# Patient Record
Sex: FEMALE | Race: WHITE | NOT HISPANIC OR LATINO | Employment: FULL TIME | URBAN - METROPOLITAN AREA
[De-identification: names, ages, dates, MRNs, and addresses within clinical notes are randomized per-mention and may not be internally consistent; named-entity substitution may affect disease eponyms.]

---

## 2019-08-09 ENCOUNTER — TELEPHONE (OUTPATIENT)
Dept: NEUROLOGY | Facility: CLINIC | Age: 44
End: 2019-08-09

## 2019-09-12 ENCOUNTER — CONSULT (OUTPATIENT)
Dept: NEUROLOGY | Facility: CLINIC | Age: 44
End: 2019-09-12
Payer: COMMERCIAL

## 2019-09-12 ENCOUNTER — APPOINTMENT (OUTPATIENT)
Dept: LAB | Facility: MEDICAL CENTER | Age: 44
End: 2019-09-12
Payer: COMMERCIAL

## 2019-09-12 VITALS
DIASTOLIC BLOOD PRESSURE: 69 MMHG | WEIGHT: 202 LBS | HEART RATE: 83 BPM | HEIGHT: 67 IN | BODY MASS INDEX: 31.71 KG/M2 | SYSTOLIC BLOOD PRESSURE: 115 MMHG

## 2019-09-12 DIAGNOSIS — R20.2 NUMBNESS AND TINGLING IN LEFT HAND: ICD-10-CM

## 2019-09-12 DIAGNOSIS — R20.0 NUMBNESS IN BOTH LEGS: ICD-10-CM

## 2019-09-12 DIAGNOSIS — G43.109 MIGRAINE WITH AURA AND WITHOUT STATUS MIGRAINOSUS, NOT INTRACTABLE: ICD-10-CM

## 2019-09-12 DIAGNOSIS — M79.642 LEFT HAND PAIN: Primary | ICD-10-CM

## 2019-09-12 DIAGNOSIS — R20.0 NUMBNESS AND TINGLING IN LEFT HAND: ICD-10-CM

## 2019-09-12 DIAGNOSIS — M79.642 LEFT HAND PAIN: ICD-10-CM

## 2019-09-12 LAB
BUN SERPL-MCNC: 11 MG/DL (ref 5–25)
C3 SERPL-MCNC: 142 MG/DL (ref 90–180)
C4 SERPL-MCNC: 25 MG/DL (ref 10–40)
CREAT SERPL-MCNC: 0.68 MG/DL (ref 0.6–1.3)
ERYTHROCYTE [SEDIMENTATION RATE] IN BLOOD: 39 MM/HOUR (ref 0–20)
GFR SERPL CREATININE-BSD FRML MDRD: 107 ML/MIN/1.73SQ M
VIT B12 SERPL-MCNC: 832 PG/ML (ref 100–900)

## 2019-09-12 PROCEDURE — 86235 NUCLEAR ANTIGEN ANTIBODY: CPT

## 2019-09-12 PROCEDURE — 99205 OFFICE O/P NEW HI 60 MIN: CPT | Performed by: PSYCHIATRY & NEUROLOGY

## 2019-09-12 PROCEDURE — 82565 ASSAY OF CREATININE: CPT

## 2019-09-12 PROCEDURE — 86225 DNA ANTIBODY NATIVE: CPT

## 2019-09-12 PROCEDURE — 36415 COLL VENOUS BLD VENIPUNCTURE: CPT

## 2019-09-12 PROCEDURE — 82607 VITAMIN B-12: CPT

## 2019-09-12 PROCEDURE — 86430 RHEUMATOID FACTOR TEST QUAL: CPT

## 2019-09-12 PROCEDURE — 86255 FLUORESCENT ANTIBODY SCREEN: CPT

## 2019-09-12 PROCEDURE — 86160 COMPLEMENT ANTIGEN: CPT

## 2019-09-12 PROCEDURE — 85652 RBC SED RATE AUTOMATED: CPT

## 2019-09-12 PROCEDURE — 84520 ASSAY OF UREA NITROGEN: CPT

## 2019-09-12 PROCEDURE — 86376 MICROSOMAL ANTIBODY EACH: CPT

## 2019-09-12 RX ORDER — SUMATRIPTAN AND NAPROXEN SODIUM 85; 500 MG/1; MG/1
1 TABLET, FILM COATED ORAL EVERY 2 HOUR PRN
COMMUNITY
Start: 2018-12-05 | End: 2022-04-27 | Stop reason: SDUPTHER

## 2019-09-12 RX ORDER — QUININE SULFATE 324 MG/1
648 CAPSULE ORAL 2 TIMES DAILY PRN
COMMUNITY
Start: 2018-06-05 | End: 2021-06-11

## 2019-09-12 RX ORDER — LEVONORGESTREL AND ETHINYL ESTRADIOL 0.15-0.03
1 KIT ORAL DAILY
COMMUNITY
Start: 2017-11-08 | End: 2021-06-11

## 2019-09-12 RX ORDER — LORAZEPAM 1 MG/1
TABLET ORAL
Qty: 2 TABLET | Refills: 0 | OUTPATIENT
Start: 2019-09-12 | End: 2019-10-03

## 2019-09-12 NOTE — PROGRESS NOTES
[facility]  PATIENT:  Michaelle Jacobs  MRN:  [id number]  :  1975  DATE OF SERVICE:  2019    Assessment/Plan:     Problem List Items Addressed This Visit        Cardiovascular and Mediastinum    Migraine with aura and without status migrainosus, not intractable    Relevant Medications    topiramate (TOPAMAX) 200 MG tablet    sertraline (ZOLOFT) 50 mg tablet    SUMAtriptan-naproxen (TREXIMET)  MG per tablet       Other    Left hand pain - Primary    Relevant Orders    ANCA Screen With MPO and PR3 With Reflex To ANCA Titer    Anti-DNA antibody, double-stranded    Sm/RNP Antibody    Numbness and tingling in left hand    Relevant Medications    LORazepam (ATIVAN) 1 mg tablet    Other Relevant Orders    RF Screen w/ Reflex to Titer    MRI brain MS wo and w contrast    MRI cervical spine with and without contrast    Sjogren's Antibodies    Complement Comp C3 + C4    Vitamin B12 (Completed)    Anti-microsomal antibody    BUN (Completed)    Creatinine, serum (Completed)    Numbness in both legs    Relevant Orders    Sedimentation rate, automated (Completed)         Mrs Christina Lutz has presented for evaluation of sensory dysfunction in her hands and feet  Patient has concern for developing CNS demyelination as her sister has MS and she is getting disabled, with worsening fatigue  We personally reviewed MRI brain from ,  and  - patient does not have single white matter lesion in her brain parenchyma  Considering today's evaluation and her history - we agreed to provide evaluation for CNS demyelination and connective tissue disorder  MRI brain and Cervical region will be further schedued, and connective tissue evaluation was advised as well, considering extensive family history of autoimmune disorders  Based on findings, patient may require LP and rheumatology evaluation  Patient is to follow in 6-8 weeks       Greater than 50% of the 60 minutes evaluation was a face-to-face discussion regarding  the pathophysiology of her current symptoms and further plan, as well as counseling, educating, and coordinating the patient's care  Subjective: sensory dysfunction in hands and feet with wrist pain    HPI History of Present Illness    Mrs Johnson Oreilly is  39 yo female who presented to [facility] for ruling out MS  Patient has a history of anxiety epilepsy and migraines, interstitial cystitis, SVT   Patient has established care with [facility]  Patient has seizures and migraines, sweating/temperature intolerance and pain in hands, with fatigue  Patient stated she has no energy and she has no motivation  Patient has recent pain in hands and constant tingling and pain in hands and feet, and it is going on and off since February 2019, and constant since June 2019  Patient is concerned for developing  MS, as her sister has MS as well  Patient has bouts of worsening fatigue and several joints pain, no erythematous changes has been noted on todays evaluation  Left hand pain has been most concerning  No focal weakness, no vision loss or double vision describe, no bladder dysfunction  The following portions of the patient's history were reviewed and updated as appropriate: She  has no past medical history on file  She   Patient Active Problem List    Diagnosis Date Noted    Left hand pain 09/12/2019    Numbness and tingling in left hand 09/12/2019    Numbness in both legs 09/12/2019    Migraine with aura and without status migrainosus, not intractable 09/12/2019     She  has no past surgical history on file  Her family history is not on file  She  reports that she has never smoked  She has never used smokeless tobacco  She reports that she drinks alcohol  Her drug history is not on file    Current Outpatient Medications   Medication Sig Dispense Refill    levonorgestrel-ethinyl estradiol (SEASONALE) 0 15-0 03 MG per tablet Take 1 tablet by mouth daily      sertraline (ZOLOFT) 50 mg tablet       SUMAtriptan-naproxen (TREXIMET)  MG per tablet Take 1 tablet by mouth every 2 (two) hours as needed      topiramate (TOPAMAX) 200 MG tablet       LORazepam (ATIVAN) 1 mg tablet Take 1 tab 40 min prior to imaging with a second tab 10 min prior to MRI 2 tablet 0    quiNINE (QUALAQUIN) 324 mg capsule Take 648 mg by mouth 2 (two) times a day as needed       No current facility-administered medications for this visit  Current Outpatient Medications on File Prior to Visit   Medication Sig    levonorgestrel-ethinyl estradiol (SEASONALE) 0 15-0 03 MG per tablet Take 1 tablet by mouth daily    sertraline (ZOLOFT) 50 mg tablet     SUMAtriptan-naproxen (TREXIMET)  MG per tablet Take 1 tablet by mouth every 2 (two) hours as needed    topiramate (TOPAMAX) 200 MG tablet     quiNINE (QUALAQUIN) 324 mg capsule Take 648 mg by mouth 2 (two) times a day as needed     No current facility-administered medications on file prior to visit  She is allergic to tetracycline            Objective:    Blood pressure 115/69, pulse 83, height 5' 6 5" (1 689 m), weight 91 6 kg (202 lb)  Physical Exam/Neurological Exam  CONSTITUTIONAL: NAD, pleasant  NECK: supple, no lymphadenopathy, no thyromegaly, no JVD  CARDIOVASCULAR: RRR, normal S1S2, no murmurs, no rubs  RESP: clear to auscultation bilaterally, no wheezes/rhonchi/rales  ABDOMEN: soft, non tender, non distended  SKIN: no rash or skin lesions  EXTREMITIES: no edema, pulses 2+bilaterally  PSYCH: appropriate mood and affect  NEUROLOGIC COMPREHENSIVE EXAM: Patient is oriented to person, place and time, NAD; appropriate affect  CN II, III, IV, V, VI, VII,VIII,IX,X,XI-XII intact with EOMI, PERRLA, OKN intact, VF grossly intact, fundi poorly visualized secondary to pupillary constriction; symmetric face noted   Motor: 5/5 UE/LE bilateral symmetric; Sensory: diminished to light touch and pinprick feet bilaterally; normal vibration sensation feet bilaterally; Coordination within normal limits on FTN and DANIS testing; DTR: 2/4 through, no Babinski, no clonus  Tandem gait is intact  Romberg: negative  ROS:  12 points of review of system was reviewed with the patient and was unremarkable with exception: see HPI  Review of Systems   Constitutional: Positive for fatigue  HENT: Positive for sinus pressure, sinus pain, sore throat and trouble swallowing  Eyes: Negative  Respiratory: Negative  Cardiovascular: Positive for chest pain  Gastrointestinal: Positive for abdominal pain  Endocrine: Negative  Genitourinary: Positive for frequency and urgency  Musculoskeletal: Positive for arthralgias and back pain  Skin: Negative  Allergic/Immunologic: Negative  Neurological: Positive for numbness and headaches  Face numbness, tingling   Hematological: Negative  Psychiatric/Behavioral: Positive for sleep disturbance  The patient is nervous/anxious

## 2019-09-13 LAB
DSDNA AB SER-ACNC: <1 IU/ML (ref 0–9)
ENA RNP AB SER-ACNC: 0.5 AI (ref 0–0.9)
ENA SM AB SER-ACNC: <0.2 AI (ref 0–0.9)
ENA SS-A AB SER-ACNC: <0.2 AI (ref 0–0.9)
ENA SS-B AB SER-ACNC: <0.2 AI (ref 0–0.9)
RHEUMATOID FACT SER QL LA: NEGATIVE
THYROPEROXIDASE AB SERPL-ACNC: 22 IU/ML (ref 0–34)

## 2019-09-17 ENCOUNTER — TRANSCRIBE ORDERS (OUTPATIENT)
Dept: ADMINISTRATIVE | Facility: HOSPITAL | Age: 44
End: 2019-09-17

## 2019-09-17 DIAGNOSIS — E01.0 THYROMEGALY: Primary | ICD-10-CM

## 2019-09-17 LAB
C-ANCA TITR SER IF: ABNORMAL TITER
MYELOPEROXIDASE AB SER IA-ACNC: <9 U/ML (ref 0–9)
P-ANCA ATYPICAL TITR SER IF: ABNORMAL TITER
P-ANCA TITR SER IF: ABNORMAL TITER
PROTEINASE3 AB SER IA-ACNC: <3.5 U/ML (ref 0–3.5)

## 2019-09-20 ENCOUNTER — HOSPITAL ENCOUNTER (OUTPATIENT)
Dept: ULTRASOUND IMAGING | Facility: MEDICAL CENTER | Age: 44
Discharge: HOME | End: 2019-09-20
Payer: COMMERCIAL

## 2019-09-20 DIAGNOSIS — E01.0 THYROMEGALY: ICD-10-CM

## 2019-09-20 PROCEDURE — 76536 US EXAM OF HEAD AND NECK: CPT

## 2019-09-23 ENCOUNTER — TELEPHONE (OUTPATIENT)
Dept: NEUROLOGY | Facility: CLINIC | Age: 44
End: 2019-09-23

## 2019-09-23 NOTE — TELEPHONE ENCOUNTER
Patient called in with medication issue  Script for lorazepam was not at pharmacy for patient to   Call placed to pharmacy  Spoke to pharmacist  No record of script from 9/12/19  Verbal order given using script from 9/12/19  Patient made aware

## 2019-09-24 ENCOUNTER — HOSPITAL ENCOUNTER (OUTPATIENT)
Dept: MRI IMAGING | Facility: HOSPITAL | Age: 44
Discharge: HOME | End: 2019-09-24
Attending: PSYCHIATRY & NEUROLOGY
Payer: COMMERCIAL

## 2019-09-24 DIAGNOSIS — R20.2 NUMBNESS AND TINGLING IN LEFT HAND: ICD-10-CM

## 2019-09-24 DIAGNOSIS — R20.0 NUMBNESS AND TINGLING IN LEFT HAND: ICD-10-CM

## 2019-09-24 PROCEDURE — 70551 MRI BRAIN STEM W/O DYE: CPT

## 2019-09-24 PROCEDURE — 72141 MRI NECK SPINE W/O DYE: CPT

## 2019-09-25 ENCOUNTER — TELEPHONE (OUTPATIENT)
Dept: NEUROLOGY | Facility: CLINIC | Age: 44
End: 2019-09-25

## 2019-09-25 NOTE — TELEPHONE ENCOUNTER
pt called and states that she had mri done yesterday  she states that they were not done with constrast because she was moving too much even with ativan 1mg x 2   she states that if she needs to have test repeated that she may need anesthesia or a higher dose of ativan   she states that ativan "snowed" her later in the day around 4:30 and she took it around 12:45-1pm  results are not available in system yet  please advise    [phone]

## 2019-09-25 NOTE — TELEPHONE ENCOUNTER
Pt called back for results, states that she is anxious for the results, I made her aware the studies are not read yet

## 2019-09-26 ENCOUNTER — TELEPHONE (OUTPATIENT)
Dept: NEUROLOGY | Facility: CLINIC | Age: 44
End: 2019-09-26

## 2019-09-26 NOTE — TELEPHONE ENCOUNTER
----- Message from Vijay Mata MD sent at 9/25/2019  4:54 PM EDT -----  Cervical spine Imaging was personally reviewed and no significant pathology was noted    Will review with patient at follow up appointment

## 2019-09-26 NOTE — TELEPHONE ENCOUNTER
Message about results of MRI brain and spine was sent to the patient through [facility] Chart - patient has no demyelination in her spine, with no changes in her age related white matter in the brain

## 2019-09-26 NOTE — TELEPHONE ENCOUNTER
----- Message from Narendra Campos RN sent at 9/26/2019  7:13 AM EDT -----      ----- Message -----  From: Judi Byrne MD  Sent: 9/25/2019   4:57 PM EDT  To: Narendra Campos RN    Brain Imaging was personally reviewed and no significant pathology was noted    Will review with patient at follow up appointment

## 2019-09-26 NOTE — TELEPHONE ENCOUNTER
Patient called for MRI results  Please advise what we can review with patient  She is concerned she has MS and would like to move follow up appt sooner

## 2019-09-26 NOTE — TELEPHONE ENCOUNTER
FYI (no action needed): Pt's  called in requesting sooner follow up appt  Would like to discuss MRI results in further detail  Pt follow up rescheduled for 10/03/19

## 2019-09-30 PROBLEM — K21.9 LARYNGOPHARYNGEAL REFLUX (LPR): Status: ACTIVE | Noted: 2019-09-30

## 2019-10-03 ENCOUNTER — OFFICE VISIT (OUTPATIENT)
Dept: NEUROLOGY | Facility: CLINIC | Age: 44
End: 2019-10-03
Payer: COMMERCIAL

## 2019-10-03 VITALS
HEART RATE: 89 BPM | WEIGHT: 199 LBS | SYSTOLIC BLOOD PRESSURE: 118 MMHG | HEIGHT: 67 IN | DIASTOLIC BLOOD PRESSURE: 78 MMHG | BODY MASS INDEX: 31.23 KG/M2

## 2019-10-03 DIAGNOSIS — R20.2 NUMBNESS AND TINGLING IN LEFT HAND: ICD-10-CM

## 2019-10-03 DIAGNOSIS — M79.642 LEFT HAND PAIN: ICD-10-CM

## 2019-10-03 DIAGNOSIS — R76.8 ANTINEUTROPHIL CYTOPLASMIC ANTIBODY (ANCA) POSITIVE: ICD-10-CM

## 2019-10-03 DIAGNOSIS — R20.0 NUMBNESS IN BOTH LEGS: ICD-10-CM

## 2019-10-03 DIAGNOSIS — G43.109 MIGRAINE WITH AURA AND WITHOUT STATUS MIGRAINOSUS, NOT INTRACTABLE: Primary | ICD-10-CM

## 2019-10-03 DIAGNOSIS — R20.0 NUMBNESS AND TINGLING IN LEFT HAND: ICD-10-CM

## 2019-10-03 PROCEDURE — 99215 OFFICE O/P EST HI 40 MIN: CPT | Performed by: PSYCHIATRY & NEUROLOGY

## 2019-10-03 NOTE — PROGRESS NOTES
[facility]  PATIENT:  Harpreet Mcwilliams  MRN:  [id number]  :  1975  DATE OF SERVICE:  10/3/2019  Assessment/Plan:   Problem List Items Addressed This Visit        Cardiovascular and Mediastinum    Migraine with aura and without status migrainosus, not intractable - Primary       Other    Left hand pain    Numbness and tingling in left hand    Relevant Orders    Ambulatory referral to Rheumatology    Numbness in both legs    Antineutrophil cytoplasmic antibody (ANCA) positive    Relevant Orders    Ambulatory referral to Rheumatology           Mrs Luis Smalls has presented for follow up on multifocal sensory dysfunction, involving upper and lower extremities; no new focal weakness or vision loss has been reported  We personally reviewed imaging studies - no concern for MS; patient has no corpus callosum involvement and no classic white matter changes noted in her brain parenchyma  Patient has stressful situation in her family now as well  We did reviewed her blood work with mildly positive pANCA and mildly elevated ESR- patient was advised to   follow up with rheumatology for further evaluation  Patient is to continue Vitamin D and she is to be gluten free at this point  We agreed to have 6 months follow up visit  Greater than 50% of the 40 minutes evaluation was a face-to-face discussion regarding  the pathophysiology of her current symptoms and further plan, as well as counseling, educating, and coordinating the patient's care  Subjective: sensory dysfunction in her upper and lower extremities  HPI History of Present Illness    Mrs Luis Smalls is  37 yo female who presented to [address] [place] [address] MS center for ruling out MS  Patient has anxiety, epilepsy and migraines, interstitial cystitis, SVT, sweating/temperature intolerance and pain in hands, chronic fatigue    Patient has established care with [facility] team  Patient sister has MS,patient and her  has been feeling more fatigued and more sensory dysfunction in her feet and hands  Blood work was completed with atypical pANCA 1:40 (The atypical pANCA pattern has been observed in a significant percentage of patients with ulcerative colitis, primary sclerosing   cholangitis and autoimmune hepatitis ); ESR 39; dsDNA and RF, with Sjogrens are negative; normal b12; C3 and C4 complement is normal;   MRI C-spine 9/24/19: Cervical spine intrinsically normal   Chiari I malformation  Tiny central noncompressive C5-C6 protrusion  MRI brain 9/24/19: Chiari I malformation  3-4 tiny foci of high signal subcortical white matter of both hemispheres nonspecific but atypical for demyelinating disease  Query, located migraine, collagen vascular disease, disease, precocious small vessel disease  The following portions of the patient's history were reviewed and updated as appropriate:   She  has no past medical history on file  She   Patient Active Problem List    Diagnosis Date Noted    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    Laryngopharyngeal reflux (LPR) 09/30/2019    Left hand pain 09/12/2019    Numbness and tingling in left hand 09/12/2019    Numbness in both legs 09/12/2019    Migraine with aura and without status migrainosus, not intractable 09/12/2019     She  has no past surgical history on file  Her family history is not on file  She  reports that she has never smoked  She has never used smokeless tobacco  She reports that she drinks alcohol  Her drug history is not on file    Current Outpatient Medications   Medication Sig Dispense Refill    levonorgestrel-ethinyl estradiol (SEASONALE) 0 15-0 03 MG per tablet Take 1 tablet by mouth daily      omeprazole (PriLOSEC) 40 MG capsule Take 1 capsule (40 mg total) by mouth daily 60 capsule 2    quiNINE (QUALAQUIN) 324 mg capsule Take 648 mg by mouth 2 (two) times a day as needed      ranitidine (ZANTAC) 300 MG tablet Take 1 tablet (300 mg total) by mouth daily at bedtime 60 tablet 2    sertraline (ZOLOFT) 50 mg tablet       SUMAtriptan-naproxen (TREXIMET)  MG per tablet Take 1 tablet by mouth every 2 (two) hours as needed      topiramate (TOPAMAX) 200 MG tablet        No current facility-administered medications for this visit  Current Outpatient Medications on File Prior to Visit   Medication Sig    levonorgestrel-ethinyl estradiol (SEASONALE) 0 15-0 03 MG per tablet Take 1 tablet by mouth daily    omeprazole (PriLOSEC) 40 MG capsule Take 1 capsule (40 mg total) by mouth daily    quiNINE (QUALAQUIN) 324 mg capsule Take 648 mg by mouth 2 (two) times a day as needed    ranitidine (ZANTAC) 300 MG tablet Take 1 tablet (300 mg total) by mouth daily at bedtime    sertraline (ZOLOFT) 50 mg tablet     SUMAtriptan-naproxen (TREXIMET)  MG per tablet Take 1 tablet by mouth every 2 (two) hours as needed    topiramate (TOPAMAX) 200 MG tablet     [DISCONTINUED] LORazepam (ATIVAN) 1 mg tablet Take 1 tab 40 min prior to imaging with a second tab 10 min prior to MRI (Patient not taking: Reported on 10/3/2019)     No current facility-administered medications on file prior to visit  She is allergic to tetracycline            Objective:    Blood pressure 118/78, pulse 89, height 5' 7" (1 702 m), weight 90 3 kg (199 lb)  Physical Exam/Neurological Exam    CONSTITUTIONAL: NAD, pleasant  NECK: supple, no lymphadenopathy, no thyromegaly, no JVD  CARDIOVASCULAR: RRR, normal S1S2, no murmurs, no rubs  RESP: clear to auscultation bilaterally, no wheezes/rhonchi/rales  ABDOMEN: soft, non tender, non distended  SKIN: no rash or skin lesions  EXTREMITIES: no edema, pulses 2+bilaterally  PSYCH: appropriate mood and affect  NEUROLOGIC COMPREHENSIVE EXAM: Patient is oriented to person, place and time, NAD; appropriate affect   CN II, III, IV, V, VI, VII,VIII,IX,X,XI-XII intact with EOMI, PERRLA, OKN intact, VF grossly intact, fundi poorly visualized secondary to pupillary constriction; symmetric face noted  Motor: 5/5 UE/LE bilateral symmetric; Sensory: intact to light touch and pinprick bilaterally; normal vibration sensation feet bilaterally; Coordination within normal limits on FTN and DANIS testing; DTR: 2/4 through, no Babinski, no clonus  Tandem gait is intact  Romberg: negative  ROS:  12 points of review of system was reviewed with the patient and was unremarkable with exception: see HPI  Review of Systems   Constitutional: Negative  HENT: Positive for sore throat and trouble swallowing  Eyes: Negative  Respiratory: Negative  Cardiovascular: Negative  Gastrointestinal: Negative  Endocrine: Negative  Genitourinary: Negative  Musculoskeletal: Positive for arthralgias, back pain and myalgias  Skin: Negative  Allergic/Immunologic: Negative  Neurological: Positive for light-headedness, numbness and headaches  Hematological: Negative  Psychiatric/Behavioral: Positive for sleep disturbance          Memory issues

## 2020-04-01 ENCOUNTER — TELEPHONE (OUTPATIENT)
Dept: NEUROLOGY | Facility: CLINIC | Age: 45
End: 2020-04-01

## 2020-04-02 ENCOUNTER — TELEPHONE (OUTPATIENT)
Dept: NEUROLOGY | Facility: CLINIC | Age: 45
End: 2020-04-02

## 2020-04-09 ENCOUNTER — TELEMEDICINE (OUTPATIENT)
Dept: NEUROLOGY | Facility: CLINIC | Age: 45
End: 2020-04-09
Payer: COMMERCIAL

## 2020-04-09 DIAGNOSIS — R20.0 NUMBNESS IN BOTH LEGS: ICD-10-CM

## 2020-04-09 DIAGNOSIS — G40.309 NONINTRACTABLE GENERALIZED IDIOPATHIC EPILEPSY WITHOUT STATUS EPILEPTICUS (HCC): ICD-10-CM

## 2020-04-09 DIAGNOSIS — R76.8 ANTINEUTROPHIL CYTOPLASMIC ANTIBODY (ANCA) POSITIVE: Primary | ICD-10-CM

## 2020-04-09 DIAGNOSIS — R20.0 NUMBNESS AND TINGLING IN LEFT HAND: ICD-10-CM

## 2020-04-09 DIAGNOSIS — R20.2 NUMBNESS AND TINGLING IN LEFT HAND: ICD-10-CM

## 2020-04-09 DIAGNOSIS — F51.04 PSYCHOPHYSIOLOGICAL INSOMNIA: ICD-10-CM

## 2020-04-09 PROCEDURE — 99214 OFFICE O/P EST MOD 30 MIN: CPT | Performed by: PSYCHIATRY & NEUROLOGY

## 2020-04-09 RX ORDER — TRAZODONE HYDROCHLORIDE 50 MG/1
50 TABLET ORAL
Qty: 15 TABLET | Refills: 0 | Status: SHIPPED | OUTPATIENT
Start: 2020-04-09 | End: 2021-06-11

## 2020-04-09 RX ORDER — ZOLPIDEM TARTRATE 5 MG/1
5 TABLET ORAL
Qty: 15 TABLET | Refills: 0 | Status: SHIPPED | OUTPATIENT
Start: 2020-04-09 | End: 2021-06-11

## 2020-06-12 ENCOUNTER — TELEPHONE (OUTPATIENT)
Dept: NEUROLOGY | Facility: CLINIC | Age: 45
End: 2020-06-12

## 2020-06-12 NOTE — TELEPHONE ENCOUNTER
Please let patient know that Dr Fina Aguilar is not in the office  I do not see the results of those tests  Please see where she got them done and we can obtain the results   Thanks

## 2020-06-12 NOTE — TELEPHONE ENCOUNTER
----- Message from Elmer Payne, RN sent at 6/12/2020  8:24 AM EDT -----  Regarding: FW: Test Results Question  Contact: [phone]      ----- Message -----  From: Latosha Dunham  Sent: 6/11/2020   9:00 PM EDT  To: [facility]  Subject: Test Results Question                            Dr Lenny Giles,  I went to get my blood work done on my [employer] and SED rate   Can you look at the results and see if I need to go see a rheumatologist  I am still having tingling in my feet quite a bit and I want to know what to do with that  Thank you    Latosha Dunham  [phone]

## 2020-06-16 NOTE — TELEPHONE ENCOUNTER
I would have her discuss with PCP if that is needed  Per Dr Priest Huseyin note, "Prior evaluation was consistent with mildly elevated atypical pANCA and ESR with follow-up by Rheumatology was advised at that time  Patient was not able to accommodate Rheumatology consult unknown reason "  And "Patient will be further advised to repeat her blood work including ANCA and ESR, as she does have clinical presentation of myofascial pain syndrome versus fibromyalgia"  It looks like Dr Sincere Blunt had provided rheumatology referral back in October

## 2020-06-17 NOTE — TELEPHONE ENCOUNTER
Based on our last office visit in October 2019, when we reviewed brain and cervical spine MRIs, we agreed patient has no radiographic signes of MS, with no concern for MS at that time  Considering repeatedly elevated ESR, findings suggest against central origin of her clinical presentation  If patient interested, we may repeat MRI brain/cervicl and add thoracic spine imaging anytime this year

## 2020-06-17 NOTE — TELEPHONE ENCOUNTER
Called patient, read Katina's message  Patient verbalizes understanding of information  Asking what Taj Lauren thought of results  Informed her there is no information in [facility] as she had attempted  States she thought our staff informed her that we are requesting results from [employer]  Reports had labs done at [facility] lab [address]  Informed her to still follow up with PCP and I will try to get results from lab  If there is anything Taj Lauren would like to add after results come in, can call with detailed messages  Called lab, spoke with Charmayne Duster, provided me number [phone]  Called lab, spoke with Christy Flores will fax all lab work  Called patient, reviewed information, she asked for call back with [facility] number  States she was just concerned as her sister has MS and she was not sure if worsened,  Or if symptoms seasonal? Asked me to leave information for Dr Jeannette Sanders to review after leave    Will route to her bin  Called patient and left detailed message for their department number

## 2020-06-19 NOTE — TELEPHONE ENCOUNTER
Called patient, read Dr Maeve Goodwin message  Patient verbalizes understanding of information  No further questions or concerns at this time  Will call in a few weeks to schedule sometime this year  Provided her central scheduling number  Please enter orders for imaging  Thank you

## 2020-08-18 ENCOUNTER — TELEPHONE (OUTPATIENT)
Dept: NEUROLOGY | Facility: CLINIC | Age: 45
End: 2020-08-18

## 2020-08-18 NOTE — TELEPHONE ENCOUNTER
Spoke with Noni Durham confirmed location change of appointment with Dr Ashley Gao 11- appt reminder sent directions to [facility] office

## 2020-10-05 ENCOUNTER — TELEPHONE (OUTPATIENT)
Dept: NEUROLOGY | Facility: CLINIC | Age: 45
End: 2020-10-05

## 2020-11-06 ENCOUNTER — TELEPHONE (OUTPATIENT)
Dept: NEUROLOGY | Facility: CLINIC | Age: 45
End: 2020-11-06

## 2020-11-09 ENCOUNTER — TELEPHONE (OUTPATIENT)
Dept: NEUROLOGY | Facility: CLINIC | Age: 45
End: 2020-11-09

## 2020-11-11 ENCOUNTER — TELEPHONE (OUTPATIENT)
Dept: NEUROLOGY | Facility: CLINIC | Age: 45
End: 2020-11-11

## 2020-11-12 ENCOUNTER — TELEMEDICINE (OUTPATIENT)
Dept: NEUROLOGY | Facility: CLINIC | Age: 45
End: 2020-11-12
Payer: COMMERCIAL

## 2020-11-12 VITALS — BODY MASS INDEX: 33.75 KG/M2 | HEIGHT: 66 IN | WEIGHT: 210 LBS

## 2020-11-12 DIAGNOSIS — G40.309 NONINTRACTABLE GENERALIZED IDIOPATHIC EPILEPSY WITHOUT STATUS EPILEPTICUS (HCC): ICD-10-CM

## 2020-11-12 DIAGNOSIS — R20.0 NUMBNESS IN BOTH LEGS: Primary | ICD-10-CM

## 2020-11-12 DIAGNOSIS — R76.8 ANTINEUTROPHIL CYTOPLASMIC ANTIBODY (ANCA) POSITIVE: ICD-10-CM

## 2020-11-12 DIAGNOSIS — R20.2 NUMBNESS AND TINGLING IN LEFT HAND: ICD-10-CM

## 2020-11-12 DIAGNOSIS — R20.0 NUMBNESS AND TINGLING IN LEFT HAND: ICD-10-CM

## 2020-11-12 PROCEDURE — 99214 OFFICE O/P EST MOD 30 MIN: CPT | Performed by: PSYCHIATRY & NEUROLOGY

## 2020-11-12 RX ORDER — GABAPENTIN 100 MG/1
100 CAPSULE ORAL DAILY
Qty: 30 CAPSULE | Refills: 0 | Status: SHIPPED | OUTPATIENT
Start: 2020-11-12 | End: 2021-06-11

## 2020-11-28 ENCOUNTER — APPOINTMENT (OUTPATIENT)
Dept: LAB | Facility: HOSPITAL | Age: 45
End: 2020-11-28
Attending: PSYCHIATRY & NEUROLOGY
Payer: COMMERCIAL

## 2020-11-28 DIAGNOSIS — R20.2 NUMBNESS AND TINGLING IN LEFT HAND: ICD-10-CM

## 2020-11-28 DIAGNOSIS — R20.0 NUMBNESS: Primary | ICD-10-CM

## 2020-11-28 DIAGNOSIS — R20.0 NUMBNESS AND TINGLING IN LEFT HAND: ICD-10-CM

## 2020-11-28 PROCEDURE — 36415 COLL VENOUS BLD VENIPUNCTURE: CPT

## 2020-11-28 PROCEDURE — 82595 ASSAY OF CRYOGLOBULIN: CPT

## 2020-11-28 PROCEDURE — 86255 FLUORESCENT ANTIBODY SCREEN: CPT

## 2020-12-02 ENCOUNTER — TELEPHONE (OUTPATIENT)
Dept: NEUROLOGY | Facility: CLINIC | Age: 45
End: 2020-12-02

## 2020-12-03 LAB — CRYOGLOB SER QL 1D COLD INC: NORMAL

## 2021-01-15 ENCOUNTER — OFFICE VISIT (OUTPATIENT)
Dept: GASTROENTEROLOGY | Facility: CLINIC | Age: 46
End: 2021-01-15
Payer: COMMERCIAL

## 2021-01-15 VITALS
WEIGHT: 210.8 LBS | HEART RATE: 95 BPM | HEIGHT: 66 IN | SYSTOLIC BLOOD PRESSURE: 112 MMHG | BODY MASS INDEX: 33.88 KG/M2 | TEMPERATURE: 98.1 F | DIASTOLIC BLOOD PRESSURE: 80 MMHG

## 2021-01-15 DIAGNOSIS — K21.9 LARYNGOPHARYNGEAL REFLUX (LPR): ICD-10-CM

## 2021-01-15 DIAGNOSIS — R79.89 ELEVATED LFTS: Primary | ICD-10-CM

## 2021-01-15 PROCEDURE — 99244 OFF/OP CNSLTJ NEW/EST MOD 40: CPT | Performed by: INTERNAL MEDICINE

## 2021-01-15 RX ORDER — FAMOTIDINE 20 MG/1
20 TABLET, FILM COATED ORAL DAILY
Qty: 30 TABLET | Refills: 4 | Status: SHIPPED | OUTPATIENT
Start: 2021-01-15 | End: 2021-06-11

## 2021-01-15 NOTE — PROGRESS NOTES
[facility] - Outpatient Follow-up Note  Zee Smoke 41 y o  female MRN: [id number]  Encounter: [id number]          ASSESSMENT AND PLAN:      [de-identified] year female with intractable migraine headache, interstitial cystitis here for evaluation of elevated alkaline phosphatase, positive p-ANCA and reflux symptoms  1  Elevated alkaline phosphatase -mild elevation less than 1 5 times upper normal limit  Slightly decreased albumin otherwise normal transaminases and bilirubin  Positive p-ANCA and mild elevation of ESR  Mild elevation of alkaline phosphatase could be secondary to medication, PBC or nonspecific finding  I will do complete serology workup to rule out underlying liver disease  Right upper quadrant sonogram to assess for biliary obstruction  Alkaline phosphatase isoenzymes  2  LPR and reflux symptoms - she has intermittent dry throat and voice change  She was previously seen by Dr Isela Gonzalez from ENT  She was on omeprazole and Pepcid  I recommend she resume Pepcid 20 mg daily at bedtime  Reflux precautions  EGD to assess for esophagitis and hiatal hernia  Follow up after EGD is completed  3 Colon cancer screening - Recommend screening colonoscopy at age 40        ______________________________________________________________________    SUBJECTIVE:    Patient is a 40 y/o female with pmh of intractable migraines and interstitial cystitis presenting for discomfort in her throat  She states that she was being seen by her neurologist, who told her to be seen by a GI provider due to her underlying autoimmune disease  The patient states that she has noticed throat pain with associated SOB  She also states that she has had intermittent LUQ pain that comes and goes, and patient cannot identify any palliative or provocative factors  She says that she was on zantac/ranitidine by her ENT, but had to stop that per recall   She also states that she used to be on omeprazole, but was stopped by her ENT  Although the patient explains that while she was on the omeprazole, she did notice a relief in her symptoms  Patient explains that she used to have a feeling of food being stuck, but this feeling has since resolved since seeing ENT  Patient denies any regurgitation, hematemesis, n/v/d, change in BM, blood in stool  Patient also explains she has been on her anticonvulsants topamax, for about 7 years now  Patient denies alcohol usage  Patient states that she notices her voice changes daily and becomes raspy in nature, but will continue to see ENT for this issue  Patient is willing to undergo EGD secondary to longstanding reflux  Patient explains that she has noticed that her legs start to shake and become numb, but she is being seen by neurology and internal medicine for this  ESR on 11/28/2020 was 57, and pANCA on 11/28/2020 was 1:40  Most recent CMP on 6/7/2020 displayed alk phos of 148, but all other LFTs WNL  Patient denies any recent abdominal u/s  REVIEW OF SYSTEMS IS OTHERWISE NEGATIVE  Historical Information   History reviewed  No pertinent past medical history  Past Surgical History:   Procedure Laterality Date    UPPER GASTROINTESTINAL ENDOSCOPY       Social History   Social History     Substance and Sexual Activity   Alcohol Use Yes    Comment: social     Social History     Substance and Sexual Activity   Drug Use Never     Social History     Tobacco Use   Smoking Status Never Smoker   Smokeless Tobacco Never Used     History reviewed  No pertinent family history      Meds/Allergies       Current Outpatient Medications:     dexamethasone (DECADRON) 4 mg tablet    famotidine (PEPCID) 20 mg tablet    gabapentin (NEURONTIN) 100 mg capsule    levonorgestrel-ethinyl estradiol (SEASONALE) 0 15-0 03 MG per tablet    omeprazole (PriLOSEC) 40 MG capsule    quiNINE (QUALAQUIN) 324 mg capsule    sertraline (ZOLOFT) 50 mg tablet    SUMAtriptan-naproxen (TREXIMET)  MG per tablet    topiramate (TOPAMAX) 200 MG tablet    traZODone (DESYREL) 50 mg tablet    zolpidem (AMBIEN) 5 mg tablet    Allergies   Allergen Reactions    Tetracycline Rash     Other reaction(s): Other (See Comments)  throat closes           Objective     Blood pressure 112/80, pulse 95, temperature 98 1 °F (36 7 °C), temperature source Tympanic, height 5' 5 5" (1 664 m), weight 95 6 kg (210 lb 12 8 oz)  Body mass index is 34 55 kg/m²  PHYSICAL EXAM:      General Appearance:   Alert, cooperative, no distress   HEENT:   Normocephalic, atraumatic, anicteric      Neck:  Supple, symmetrical, trachea midline   Lungs:   Clear to auscultation bilaterally; no rales, rhonchi or wheezing; respirations unlabored    Heart[de-identified]   Regular rate and rhythm; no murmur, rub, or gallop  Abdomen:   Soft, non-tender, non-distended; normal bowel sounds; no masses, no organomegaly    Genitalia:   Deferred    Rectal:   Deferred    Extremities:  No cyanosis, clubbing or edema    Pulses:  2+ and symmetric    Skin:  No jaundice, rashes, or lesions    Lymph nodes:  No palpable cervical lymphadenopathy        Lab Results:   No visits with results within 1 Day(s) from this visit  Latest known visit with results is:   Appointment on 11/28/2020   Component Date Value    C-ANCA 11/28/2020 <1:20     Atypical pANCA 11/28/2020 1:40*    MPO AB 11/28/2020 <9 0     NC-3 AB 11/28/2020 <3 5     P-ANCA 11/28/2020 <1:20     Cryoglobulin 11/28/2020 Comment          Radiology Results:   No results found

## 2021-01-24 ENCOUNTER — LAB (OUTPATIENT)
Dept: LAB | Facility: HOSPITAL | Age: 46
End: 2021-01-24
Attending: INTERNAL MEDICINE
Payer: COMMERCIAL

## 2021-01-24 ENCOUNTER — HOSPITAL ENCOUNTER (OUTPATIENT)
Dept: ULTRASOUND IMAGING | Facility: HOSPITAL | Age: 46
Discharge: HOME | End: 2021-01-24
Attending: INTERNAL MEDICINE
Payer: COMMERCIAL

## 2021-01-24 DIAGNOSIS — R79.89 ELEVATED LFTS: ICD-10-CM

## 2021-01-24 LAB
ALBUMIN SERPL BCP-MCNC: 3.4 G/DL (ref 3.5–5)
ALP SERPL-CCNC: 187 U/L (ref 46–116)
ALT SERPL W P-5'-P-CCNC: 31 U/L (ref 12–78)
ANION GAP SERPL CALCULATED.3IONS-SCNC: 9 MMOL/L (ref 4–13)
AST SERPL W P-5'-P-CCNC: 23 U/L (ref 5–45)
BILIRUB SERPL-MCNC: 0.23 MG/DL (ref 0.2–1)
BUN SERPL-MCNC: 16 MG/DL (ref 5–25)
CALCIUM ALBUM COR SERPL-MCNC: 9.9 MG/DL (ref 8.3–10.1)
CALCIUM SERPL-MCNC: 9.4 MG/DL (ref 8.3–10.1)
CHLORIDE SERPL-SCNC: 104 MMOL/L (ref 100–108)
CO2 SERPL-SCNC: 25 MMOL/L (ref 21–32)
CREAT SERPL-MCNC: 0.71 MG/DL (ref 0.6–1.3)
CRP SERPL QL: 19 MG/L
ERYTHROCYTE [DISTWIDTH] IN BLOOD BY AUTOMATED COUNT: 12.5 % (ref 11.6–15.1)
FERRITIN SERPL-MCNC: 47 NG/ML (ref 8–388)
GFR SERPL CREATININE-BSD FRML MDRD: 104 ML/MIN/1.73SQ M
GLUCOSE P FAST SERPL-MCNC: 90 MG/DL (ref 65–99)
HAV AB SER QL IA: NORMAL
HBV CORE AB SER QL: NORMAL
HBV SURFACE AB SER-ACNC: 7.94 MIU/ML
HCT VFR BLD AUTO: 44.5 % (ref 34.8–46.1)
HCV AB SER QL: NORMAL
HGB BLD-MCNC: 14.2 G/DL (ref 11.5–15.4)
IGA SERPL-MCNC: 366 MG/DL (ref 70–400)
IGG SERPL-MCNC: 1470 MG/DL (ref 700–1600)
IGM SERPL-MCNC: 138 MG/DL (ref 40–230)
INR PPP: 0.95 (ref 0.84–1.19)
IRON SATN MFR SERPL: 30 %
IRON SERPL-MCNC: 119 UG/DL (ref 50–170)
MCH RBC QN AUTO: 30.5 PG (ref 26.8–34.3)
MCHC RBC AUTO-ENTMCNC: 31.9 G/DL (ref 31.4–37.4)
MCV RBC AUTO: 96 FL (ref 82–98)
PLATELET # BLD AUTO: 297 THOUSANDS/UL (ref 149–390)
PMV BLD AUTO: 9.1 FL (ref 8.9–12.7)
POTASSIUM SERPL-SCNC: 3.7 MMOL/L (ref 3.5–5.3)
PROT SERPL-MCNC: 8.5 G/DL (ref 6.4–8.2)
PROTHROMBIN TIME: 12.5 SECONDS (ref 11.6–14.5)
RBC # BLD AUTO: 4.65 MILLION/UL (ref 3.81–5.12)
SODIUM SERPL-SCNC: 138 MMOL/L (ref 136–145)
TIBC SERPL-MCNC: 394 UG/DL (ref 250–450)
WBC # BLD AUTO: 7.61 THOUSAND/UL (ref 4.31–10.16)

## 2021-01-24 PROCEDURE — 82728 ASSAY OF FERRITIN: CPT

## 2021-01-24 PROCEDURE — 85610 PROTHROMBIN TIME: CPT

## 2021-01-24 PROCEDURE — 84075 ASSAY ALKALINE PHOSPHATASE: CPT

## 2021-01-24 PROCEDURE — 86140 C-REACTIVE PROTEIN: CPT

## 2021-01-24 PROCEDURE — 76705 ECHO EXAM OF ABDOMEN: CPT

## 2021-01-24 PROCEDURE — 82103 ALPHA-1-ANTITRYPSIN TOTAL: CPT

## 2021-01-24 PROCEDURE — 86256 FLUORESCENT ANTIBODY TITER: CPT

## 2021-01-24 PROCEDURE — 86704 HEP B CORE ANTIBODY TOTAL: CPT

## 2021-01-24 PROCEDURE — 86803 HEPATITIS C AB TEST: CPT

## 2021-01-24 PROCEDURE — 86706 HEP B SURFACE ANTIBODY: CPT

## 2021-01-24 PROCEDURE — 83540 ASSAY OF IRON: CPT

## 2021-01-24 PROCEDURE — 84080 ASSAY ALKALINE PHOSPHATASES: CPT

## 2021-01-24 PROCEDURE — 80053 COMPREHEN METABOLIC PANEL: CPT

## 2021-01-24 PROCEDURE — 82784 ASSAY IGA/IGD/IGG/IGM EACH: CPT

## 2021-01-24 PROCEDURE — 85027 COMPLETE CBC AUTOMATED: CPT

## 2021-01-24 PROCEDURE — 82390 ASSAY OF CERULOPLASMIN: CPT

## 2021-01-24 PROCEDURE — 86038 ANTINUCLEAR ANTIBODIES: CPT

## 2021-01-24 PROCEDURE — 83550 IRON BINDING TEST: CPT

## 2021-01-24 PROCEDURE — 86235 NUCLEAR ANTIGEN ANTIBODY: CPT

## 2021-01-24 PROCEDURE — 36415 COLL VENOUS BLD VENIPUNCTURE: CPT

## 2021-01-24 PROCEDURE — 86708 HEPATITIS A ANTIBODY: CPT

## 2021-01-25 ENCOUNTER — TELEPHONE (OUTPATIENT)
Dept: GASTROENTEROLOGY | Facility: AMBULARY SURGERY CENTER | Age: 46
End: 2021-01-25

## 2021-01-25 LAB
A1AT SERPL-MCNC: 198 MG/DL (ref 101–187)
ACTIN IGG SERPL-ACNC: 5 UNITS (ref 0–19)
CERULOPLASMIN SERPL-MCNC: 45.1 MG/DL (ref 19–39)
MITOCHONDRIA M2 IGG SER-ACNC: <20 UNITS (ref 0–20)
RYE IGE QN: NEGATIVE

## 2021-01-25 NOTE — TELEPHONE ENCOUNTER
Patients GI provider:  Dr Delmi Riojas    Number to return call: [phone]    Reason for call: Pt calling requesting for someone to review some labs with her and also ultrasound results       Scheduled procedure/appointment date if applicable: Apt/procedure 4/9/21

## 2021-01-26 ENCOUNTER — PREP FOR PROCEDURE (OUTPATIENT)
Dept: GASTROENTEROLOGY | Facility: CLINIC | Age: 46
End: 2021-01-26

## 2021-01-26 ENCOUNTER — TELEPHONE (OUTPATIENT)
Dept: GASTROENTEROLOGY | Facility: CLINIC | Age: 46
End: 2021-01-26

## 2021-01-26 DIAGNOSIS — R19.4 CHANGE IN BOWEL HABIT: ICD-10-CM

## 2021-01-26 DIAGNOSIS — K21.9 LARYNGOPHARYNGEAL REFLUX (LPR): Primary | ICD-10-CM

## 2021-01-26 DIAGNOSIS — R19.4 CHANGE IN BOWEL HABIT: Primary | ICD-10-CM

## 2021-01-26 RX ORDER — SODIUM, POTASSIUM,MAG SULFATES 17.5-3.13G
177 SOLUTION, RECONSTITUTED, ORAL ORAL ONCE
Qty: 2 BOTTLE | Refills: 0 | Status: SHIPPED | OUTPATIENT
Start: 2021-01-26 | End: 2021-02-25

## 2021-01-26 NOTE — TELEPHONE ENCOUNTER
----- Message from [facility] sent at 1/26/2021 10:05 AM EST -----    ----- Message -----  From: Jasbir Bowers MD  Sent: 1/26/2021   9:25 AM EST  To: , #    I discussed the result with the patient  Most of the workup for underlying liver disease are negative  Mild elevation of ceruloplasmin and alpha 1 antitrypsin which has no clinical significance  Because of persistent elevation of alkaline phosphatase and positive atypical p-ANCA she will need MRI/MRCP to assess for [facility] and colonoscopy added to her EGD to assess for ulcerative colitis  Patient verbalized understanding and agreed with the plan

## 2021-01-26 NOTE — RESULT ENCOUNTER NOTE
I discussed the result with the patient  Most of the workup for underlying liver disease are negative  Mild elevation of ceruloplasmin and alpha 1 antitrypsin which has no clinical significance  Because of persistent elevation of alkaline phosphatase and positive atypical p-ANCA she will need MRI/MRCP to assess for [place] and colonoscopy added to her EGD to assess for ulcerative colitis  Patient verbalized understanding and agreed with the plan

## 2021-01-26 NOTE — TELEPHONE ENCOUNTER
Colonoscopy has been added onto egd on 2/25/21 and suprep instructions have been mailed to the patient

## 2021-01-27 LAB
ALP BONE CFR SERPL: 30 % (ref 14–68)
ALP INTEST CFR SERPL: 12 % (ref 0–18)
ALP LIVER CFR SERPL: 58 % (ref 18–85)
ALP SERPL-CCNC: 183 IU/L (ref 39–117)

## 2021-01-28 NOTE — PATIENT INSTRUCTIONS
EGD scheduled on 2/25/21 with Dr Gilman at [facility] [place] gave pt instructions

## 2021-01-29 ENCOUNTER — TELEPHONE (OUTPATIENT)
Dept: GASTROENTEROLOGY | Facility: CLINIC | Age: 46
End: 2021-01-29

## 2021-01-29 NOTE — TELEPHONE ENCOUNTER
We have Suprep Bowel Prep samples at [facility] office  Per Lea becker to give sample to the patient  Patient will come to our office today to  sample

## 2021-01-29 NOTE — TELEPHONE ENCOUNTER
Since Suprep is too expensive, please give instructions for MiraLAX/Dulcolax instead and have her pick this up OTC

## 2021-01-30 DIAGNOSIS — R74.8 ELEVATED ALKALINE PHOSPHATASE LEVEL: ICD-10-CM

## 2021-01-30 DIAGNOSIS — K76.9 LIVER LESION: ICD-10-CM

## 2021-01-30 DIAGNOSIS — R76.8 ANTINEUTROPHIL CYTOPLASMIC ANTIBODY (ANCA) POSITIVE: Primary | ICD-10-CM

## 2021-02-03 ENCOUNTER — TELEPHONE (OUTPATIENT)
Dept: GASTROENTEROLOGY | Facility: AMBULARY SURGERY CENTER | Age: 46
End: 2021-02-03

## 2021-02-08 ENCOUNTER — TELEPHONE (OUTPATIENT)
Dept: GASTROENTEROLOGY | Facility: CLINIC | Age: 46
End: 2021-02-08

## 2021-02-10 ENCOUNTER — IMMUNIZATIONS (OUTPATIENT)
Dept: FAMILY MEDICINE CLINIC | Facility: HOSPITAL | Age: 46
End: 2021-02-10

## 2021-02-10 DIAGNOSIS — Z23 ENCOUNTER FOR IMMUNIZATION: Primary | ICD-10-CM

## 2021-02-10 PROCEDURE — 91301 SARS-COV-2 / COVID-19 MRNA VACCINE (MODERNA) 100 MCG: CPT

## 2021-02-10 PROCEDURE — 0011A SARS-COV-2 / COVID-19 MRNA VACCINE (MODERNA) 100 MCG: CPT

## 2021-02-11 ENCOUNTER — ANESTHESIA EVENT (OUTPATIENT)
Dept: GASTROENTEROLOGY | Facility: AMBULARY SURGERY CENTER | Age: 46
End: 2021-02-11

## 2021-02-25 ENCOUNTER — HOSPITAL ENCOUNTER (OUTPATIENT)
Dept: GASTROENTEROLOGY | Facility: AMBULARY SURGERY CENTER | Age: 46
Setting detail: OUTPATIENT SURGERY
Discharge: HOME | End: 2021-02-25
Attending: INTERNAL MEDICINE | Admitting: INTERNAL MEDICINE
Payer: COMMERCIAL

## 2021-02-25 ENCOUNTER — ANESTHESIA (OUTPATIENT)
Dept: GASTROENTEROLOGY | Facility: AMBULARY SURGERY CENTER | Age: 46
End: 2021-02-25

## 2021-02-25 VITALS
RESPIRATION RATE: 18 BRPM | OXYGEN SATURATION: 99 % | DIASTOLIC BLOOD PRESSURE: 55 MMHG | HEART RATE: 80 BPM | SYSTOLIC BLOOD PRESSURE: 98 MMHG | TEMPERATURE: 96.9 F

## 2021-02-25 DIAGNOSIS — K21.9 LARYNGOPHARYNGEAL REFLUX (LPR): ICD-10-CM

## 2021-02-25 DIAGNOSIS — R19.4 CHANGE IN BOWEL HABIT: ICD-10-CM

## 2021-02-25 PROBLEM — F41.9 ANXIETY: Status: ACTIVE | Noted: 2017-05-31

## 2021-02-25 PROBLEM — F32.A DEPRESSION: Status: ACTIVE | Noted: 2021-02-25

## 2021-02-25 PROBLEM — E66.09 NON MORBID OBESITY DUE TO EXCESS CALORIES: Status: ACTIVE | Noted: 2017-02-23

## 2021-02-25 LAB
EXT PREGNANCY TEST URINE: NEGATIVE
EXT. CONTROL: NORMAL

## 2021-02-25 PROCEDURE — 45385 COLONOSCOPY W/LESION REMOVAL: CPT | Performed by: INTERNAL MEDICINE

## 2021-02-25 PROCEDURE — 88112 CYTOPATH CELL ENHANCE TECH: CPT | Performed by: PATHOLOGY

## 2021-02-25 PROCEDURE — 43239 EGD BIOPSY SINGLE/MULTIPLE: CPT | Performed by: INTERNAL MEDICINE

## 2021-02-25 PROCEDURE — 88305 TISSUE EXAM BY PATHOLOGIST: CPT | Performed by: PATHOLOGY

## 2021-02-25 PROCEDURE — 81025 URINE PREGNANCY TEST: CPT | Performed by: ANESTHESIOLOGY

## 2021-02-25 RX ORDER — SODIUM CHLORIDE, SODIUM LACTATE, POTASSIUM CHLORIDE, CALCIUM CHLORIDE 600; 310; 30; 20 MG/100ML; MG/100ML; MG/100ML; MG/100ML
20 INJECTION, SOLUTION INTRAVENOUS CONTINUOUS
Status: CANCELLED | OUTPATIENT
Start: 2021-02-25

## 2021-02-25 RX ORDER — ONDANSETRON 2 MG/ML
4 INJECTION INTRAMUSCULAR; INTRAVENOUS ONCE AS NEEDED
Status: CANCELLED | OUTPATIENT
Start: 2021-02-25

## 2021-02-25 RX ORDER — PROPOFOL 10 MG/ML
INJECTION, EMULSION INTRAVENOUS AS NEEDED
Status: DISCONTINUED | OUTPATIENT
Start: 2021-02-25 | End: 2021-02-25

## 2021-02-25 RX ORDER — SODIUM CHLORIDE, SODIUM LACTATE, POTASSIUM CHLORIDE, CALCIUM CHLORIDE 600; 310; 30; 20 MG/100ML; MG/100ML; MG/100ML; MG/100ML
125 INJECTION, SOLUTION INTRAVENOUS CONTINUOUS
Status: DISCONTINUED | OUTPATIENT
Start: 2021-02-25 | End: 2021-03-01 | Stop reason: HOSPADM

## 2021-02-25 RX ORDER — DICHLORALPHENAZONE 100 %
POWDER (GRAM) MISCELLANEOUS
COMMUNITY
End: 2021-06-11

## 2021-02-25 RX ORDER — LIDOCAINE HYDROCHLORIDE 10 MG/ML
INJECTION, SOLUTION EPIDURAL; INFILTRATION; INTRACAUDAL; PERINEURAL AS NEEDED
Status: DISCONTINUED | OUTPATIENT
Start: 2021-02-25 | End: 2021-02-25

## 2021-02-25 RX ADMIN — LIDOCAINE HYDROCHLORIDE 50 MG: 10 INJECTION, SOLUTION EPIDURAL; INFILTRATION; INTRACAUDAL at 12:38

## 2021-02-25 RX ADMIN — PROPOFOL 50 MG: 10 INJECTION, EMULSION INTRAVENOUS at 13:03

## 2021-02-25 RX ADMIN — PROPOFOL 50 MG: 10 INJECTION, EMULSION INTRAVENOUS at 12:43

## 2021-02-25 RX ADMIN — PROPOFOL 50 MG: 10 INJECTION, EMULSION INTRAVENOUS at 12:46

## 2021-02-25 RX ADMIN — PROPOFOL 40 MG: 10 INJECTION, EMULSION INTRAVENOUS at 12:41

## 2021-02-25 RX ADMIN — PROPOFOL 50 MG: 10 INJECTION, EMULSION INTRAVENOUS at 12:44

## 2021-02-25 RX ADMIN — PROPOFOL 50 MG: 10 INJECTION, EMULSION INTRAVENOUS at 12:55

## 2021-02-25 RX ADMIN — SODIUM CHLORIDE, SODIUM LACTATE, POTASSIUM CHLORIDE, AND CALCIUM CHLORIDE: .6; .31; .03; .02 INJECTION, SOLUTION INTRAVENOUS at 12:42

## 2021-02-25 RX ADMIN — SODIUM CHLORIDE, SODIUM LACTATE, POTASSIUM CHLORIDE, AND CALCIUM CHLORIDE: .6; .31; .03; .02 INJECTION, SOLUTION INTRAVENOUS at 12:03

## 2021-02-25 RX ADMIN — PROPOFOL 40 MG: 10 INJECTION, EMULSION INTRAVENOUS at 12:40

## 2021-02-25 RX ADMIN — PROPOFOL 120 MG: 10 INJECTION, EMULSION INTRAVENOUS at 12:38

## 2021-02-25 RX ADMIN — PROPOFOL 50 MG: 10 INJECTION, EMULSION INTRAVENOUS at 12:52

## 2021-02-25 RX ADMIN — PROPOFOL 50 MG: 10 INJECTION, EMULSION INTRAVENOUS at 12:49

## 2021-02-25 RX ADMIN — PROPOFOL 50 MG: 10 INJECTION, EMULSION INTRAVENOUS at 13:00

## 2021-02-25 NOTE — H&P
History and Physical -  [facility]  Kasandra Gaytan 41 y o  female MRN: [id number]    HPI: Kasandra Gaytan is a 41y o  year old female who presents for EGD and colonoscopy  Review of Systems    Historical Information   History reviewed  No pertinent past medical history  Past Surgical History:   Procedure Laterality Date    UPPER GASTROINTESTINAL ENDOSCOPY       Social History   Social History     Substance and Sexual Activity   Alcohol Use Yes    Comment: social     Social History     Substance and Sexual Activity   Drug Use Never     Social History     Tobacco Use   Smoking Status Never Smoker   Smokeless Tobacco Never Used     History reviewed  No pertinent family history  Meds/Allergies     (Not in a hospital admission)      Allergies   Allergen Reactions    Tetracycline Rash     Other reaction(s): Other (See Comments)  throat closes       Objective     There were no vitals taken for this visit  PHYSICAL EXAM    Gen: NAD  CV: RRR  CHEST: Clear  ABD: soft, NT/ND  EXT: no edema  Neuro: AAO      ASSESSMENT/PLAN:  This is a 41y o  year old female here for EGD and colonoscopy for evaluation of reflux symptoms and change in bowel habits    Elevated atypical p-ANCA rule out inflammatory bowel  PLAN:   Procedure:  EGD and colonoscopy with biopsy

## 2021-02-25 NOTE — ANESTHESIA PREPROCEDURE EVALUATION
Procedure:  COLONOSCOPY  EGD    Relevant Problems   CARDIO   (+) Migraine with aura and without status migrainosus, not intractable      NEURO/PSYCH   (+) Anxiety   (+) Complex partial seizure disorder (HCC)   (+) Depression      Other   (+) Non morbid obesity due to excess calories        Physical Exam    Airway    Mallampati score: III  TM Distance: >3 FB  Neck ROM: full     Dental   No notable dental hx     Cardiovascular      Pulmonary      Other Findings        Anesthesia Plan  ASA Score- 2     Anesthesia Type- IV sedation with anesthesia with ASA Monitors  Additional Monitors:   Airway Plan:           Plan Factors-Exercise tolerance (METS): >4 METS  Chart reviewed  Existing labs reviewed  Patient summary reviewed  Induction- intravenous  Postoperative Plan-     Informed Consent- Anesthetic plan and risks discussed with patient  I personally reviewed this patient with the CRNA  Discussed and agreed on the Anesthesia Plan with the CRNA  Ranjeet Dutton

## 2021-02-25 NOTE — DISCHARGE INSTRUCTIONS
Colorectal Polyps   WHAT YOU NEED TO KNOW:   Colorectal polyps are small growths of tissue in the lining of the colon and rectum  Most polyps are hyperplastic polyps and are usually benign (noncancerous)  Certain types of polyps, called adenomatous polyps, may turn into cancer  DISCHARGE INSTRUCTIONS:   Follow up with your healthcare provider or gastroenterologist as directed: You may need to return for more tests, such as another colonoscopy  Write down your questions so you remember to ask them during your visits  Reduce your risk for colorectal polyps:   · Eat a variety of healthy foods:  Healthy foods include fruit, vegetables, whole-grain breads, low-fat dairy products, beans, lean meat, and fish  Ask if you need to be on a special diet  · Maintain a healthy weight:  Ask your healthcare provider if you need to lose weight and how much you need to lose  Ask for help with a weight loss program     · Exercise:  Begin to exercise slowly and do more as you get stronger  Talk with your healthcare provider before you start an exercise program      · Limit alcohol:  Your risk for polyps increases the more you drink  · Do not smoke: If you smoke, it is never too late to quit  Ask for information about how to stop  For support and more information:   · Cristel Henry ([facility])  [address], [place] [address]  Phone: 3- 233 - [phone]  Web Address: www digestive  niddk nih gov    Contact your healthcare provider or gastroenterologist if:   · You have a fever  · You have chills, a cough, or feel weak and achy  · You have abdominal pain that does not go away or gets worse after you take medicine  · Your abdomen is swollen  · You are losing weight without trying  · You have questions or concerns about your condition or care  Seek care immediately or call 911 if:   · You have sudden shortness of breath       · You have a fast heart rate, fast breathing, or are too dizzy to stand up  · You have severe abdominal pain  · You see blood in your bowel movement  © Copyright [address] Information is for End User's use only and may not be sold, redistributed or otherwise used for commercial purposes  All illustrations and images included in CareNotes® are the copyrighted property of [employer]  or [facility] Gary Beltran   The above information is an  only  It is not intended as medical advice for individual conditions or treatments  Talk to your doctor, nurse or pharmacist before following any medical regimen to see if it is safe and effective for you

## 2021-02-25 NOTE — ANESTHESIA POSTPROCEDURE EVALUATION
Post-Op Assessment Note    CV Status:  Stable  Pain Score: 0    Pain management: adequate     Mental Status:  Alert and awake   Hydration Status:  Euvolemic   PONV Controlled:  Controlled   Airway Patency:  Patent      Post Op Vitals Reviewed: Yes      Staff: CRNA         No complications documented      BP   119/60   Temp      Pulse  85   Resp   15   SpO2   98

## 2021-02-26 ENCOUNTER — TELEPHONE (OUTPATIENT)
Dept: GASTROENTEROLOGY | Facility: CLINIC | Age: 46
End: 2021-02-26

## 2021-02-26 NOTE — TELEPHONE ENCOUNTER
Patients GI provider:  Dr Deng Sims    Number to return call: [phone]    Reason for call: Pt calling stating she had egd/colon procedures yesterday and wanted to know if it is normal for her to stomach soreness from her going to the bathroom a lot and her throat is still sore    Scheduled procedure/appointment date if applicable: NA

## 2021-02-26 NOTE — TELEPHONE ENCOUNTER
Dr Chiquita Vines patient had EGD/Colonoscopy/ Polypectomy yesterday  Patient reports symptoms started today- sore throat, headache, intermittent nausea, loose stool x 3/ no blood or mucous and 3/10 right sided abdominal discomfort  Passing flatus, abdomen soft and afebrile  Recommended increasing po fluids/gatorade for hydration, throat lozenges, bland diet  Call back if symptoms do not improve  ED evaluation if symptoms increase/ severe  [place] text sent to Dr Chiquita Vines as per post procedure protocol

## 2021-03-02 DIAGNOSIS — B37.81 CANDIDA ESOPHAGITIS (HCC): Primary | ICD-10-CM

## 2021-03-02 RX ORDER — FLUCONAZOLE 200 MG/1
200 TABLET ORAL DAILY
Qty: 14 TABLET | Refills: 0 | Status: SHIPPED | OUTPATIENT
Start: 2021-03-02 | End: 2021-03-16

## 2021-03-02 NOTE — PROGRESS NOTES
Discussed the result with Donta Matthews     Esophagitis with positive fungal element -Treatment with fluconazole  For 14 days and repeat EGD in 3 months  For repeat biopsy and brushing to assess for cellular atypia

## 2021-03-05 ENCOUNTER — TELEPHONE (OUTPATIENT)
Dept: GASTROENTEROLOGY | Facility: CLINIC | Age: 46
End: 2021-03-05

## 2021-03-05 NOTE — TELEPHONE ENCOUNTER
----- Message from Markie Salazar MD sent at 3/2/2021  6:41 PM EST -----  Regarding: repeat EGD   Please schedule her for repeat EGD in 3 months    Thank you    Nita Medic

## 2021-03-07 ENCOUNTER — HOSPITAL ENCOUNTER (OUTPATIENT)
Dept: MRI IMAGING | Facility: HOSPITAL | Age: 46
Discharge: HOME | End: 2021-03-07
Attending: INTERNAL MEDICINE
Payer: COMMERCIAL

## 2021-03-07 DIAGNOSIS — K76.9 LIVER LESION: ICD-10-CM

## 2021-03-07 DIAGNOSIS — R74.8 ELEVATED ALKALINE PHOSPHATASE LEVEL: ICD-10-CM

## 2021-03-07 DIAGNOSIS — R76.8 ANTINEUTROPHIL CYTOPLASMIC ANTIBODY (ANCA) POSITIVE: ICD-10-CM

## 2021-03-07 PROCEDURE — 74183 MRI ABD W/O CNTR FLWD CNTR: CPT

## 2021-03-07 PROCEDURE — A9585 GADOBUTROL INJECTION: HCPCS | Performed by: INTERNAL MEDICINE

## 2021-03-07 PROCEDURE — G1004 CDSM NDSC: HCPCS

## 2021-03-07 RX ADMIN — GADOBUTROL 11 ML: 604.72 INJECTION INTRAVENOUS at 13:51

## 2021-03-10 ENCOUNTER — IMMUNIZATIONS (OUTPATIENT)
Dept: FAMILY MEDICINE CLINIC | Facility: HOSPITAL | Age: 46
End: 2021-03-10

## 2021-03-10 DIAGNOSIS — Z23 ENCOUNTER FOR IMMUNIZATION: Primary | ICD-10-CM

## 2021-03-10 PROCEDURE — 0012A SARS-COV-2 / COVID-19 MRNA VACCINE (MODERNA) 100 MCG: CPT

## 2021-03-10 PROCEDURE — 91301 SARS-COV-2 / COVID-19 MRNA VACCINE (MODERNA) 100 MCG: CPT

## 2021-03-11 ENCOUNTER — TELEPHONE (OUTPATIENT)
Dept: GASTROENTEROLOGY | Facility: AMBULARY SURGERY CENTER | Age: 46
End: 2021-03-11

## 2021-03-11 NOTE — TELEPHONE ENCOUNTER
Patients GI provider:  Dr Alona Nelson    Number to return call: (  [phone]    Reason for call: Pt calling for mri abdomen results    Scheduled procedure/appointment date if applicable: Apt/procedure 4-9-21

## 2021-03-12 ENCOUNTER — PREP FOR PROCEDURE (OUTPATIENT)
Dept: GASTROENTEROLOGY | Facility: CLINIC | Age: 46
End: 2021-03-12

## 2021-03-12 ENCOUNTER — TELEMEDICINE (OUTPATIENT)
Dept: NEUROLOGY | Facility: CLINIC | Age: 46
End: 2021-03-12
Payer: COMMERCIAL

## 2021-03-12 ENCOUNTER — TELEPHONE (OUTPATIENT)
Dept: GASTROENTEROLOGY | Facility: CLINIC | Age: 46
End: 2021-03-12

## 2021-03-12 VITALS — BODY MASS INDEX: 32.78 KG/M2 | WEIGHT: 204 LBS | HEIGHT: 66 IN

## 2021-03-12 DIAGNOSIS — R76.8 ANTINEUTROPHIL CYTOPLASMIC ANTIBODY (ANCA) POSITIVE: ICD-10-CM

## 2021-03-12 DIAGNOSIS — R20.0 NUMBNESS IN BOTH LEGS: Primary | ICD-10-CM

## 2021-03-12 DIAGNOSIS — F51.04 PSYCHOPHYSIOLOGICAL INSOMNIA: ICD-10-CM

## 2021-03-12 DIAGNOSIS — B37.81 ESOPHAGEAL CANDIDIASIS (HCC): ICD-10-CM

## 2021-03-12 DIAGNOSIS — G93.5 CHIARI I MALFORMATION (HCC): ICD-10-CM

## 2021-03-12 DIAGNOSIS — K21.9 LARYNGOPHARYNGEAL REFLUX: Primary | ICD-10-CM

## 2021-03-12 DIAGNOSIS — R20.0 NUMBNESS AND TINGLING IN LEFT HAND: ICD-10-CM

## 2021-03-12 DIAGNOSIS — R20.2 NUMBNESS AND TINGLING IN LEFT HAND: ICD-10-CM

## 2021-03-12 DIAGNOSIS — G40.309 NONINTRACTABLE GENERALIZED IDIOPATHIC EPILEPSY WITHOUT STATUS EPILEPTICUS (HCC): ICD-10-CM

## 2021-03-12 PROCEDURE — 99215 OFFICE O/P EST HI 40 MIN: CPT | Performed by: PSYCHIATRY & NEUROLOGY

## 2021-03-12 RX ORDER — PANTOPRAZOLE SODIUM 40 MG/1
TABLET, DELAYED RELEASE ORAL
COMMUNITY
Start: 2021-02-27 | End: 2021-05-06 | Stop reason: SDUPTHER

## 2021-03-12 NOTE — TELEPHONE ENCOUNTER
EGD scheduled on 6/29/21with Dr Gilman at [facility] gave pt verbal instructions/mailed

## 2021-03-12 NOTE — TELEPHONE ENCOUNTER
Patient called requesting MRI results  Results are finalized  Please review the results so that we could relay the results to the patient  Thank you

## 2021-03-12 NOTE — TELEPHONE ENCOUNTER
Reached vm, left message results are pending, we will call her next week to discuss questions or she can cb as well

## 2021-03-12 NOTE — TELEPHONE ENCOUNTER
----- Message from Karel Bach sent at 3/12/2021  7:51 AM EST -----  Regarding: FW: Test Results Question  Contact: [phone]    ----- Message -----  From: Keren Corinne  Sent: 3/11/2021   7:23 PM EST  To: , #  Subject: Test Results Question                            Dr Silviano Armstrong,  I am waiting for the MRI results and MRCP results  I called Thursday for results and the test was done on Sun March 7  Remember how we had fungal stuff inside my esophagus and it was swollen  I have had difficulty breathing  Often times if I talk at length gloria with a mask I have to catch my breath  Do you know what that is caused by? It is not comforting  Any suggestions?  Thank you   Yumiko Gonzalez
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
## 2021-03-24 ENCOUNTER — HOSPITAL ENCOUNTER (OUTPATIENT)
Dept: CT IMAGING | Facility: HOSPITAL | Age: 46
Discharge: HOME | End: 2021-03-24
Attending: PSYCHIATRY & NEUROLOGY
Payer: COMMERCIAL

## 2021-03-24 DIAGNOSIS — B37.81 ESOPHAGEAL CANDIDIASIS (HCC): ICD-10-CM

## 2021-03-24 DIAGNOSIS — R76.8 ANTINEUTROPHIL CYTOPLASMIC ANTIBODY (ANCA) POSITIVE: ICD-10-CM

## 2021-03-24 PROCEDURE — G1004 CDSM NDSC: HCPCS

## 2021-03-24 PROCEDURE — 71260 CT THORAX DX C+: CPT

## 2021-03-24 RX ADMIN — IOHEXOL 85 ML: 350 INJECTION, SOLUTION INTRAVENOUS at 18:56

## 2021-03-26 ENCOUNTER — TELEPHONE (OUTPATIENT)
Dept: NEUROLOGY | Facility: CLINIC | Age: 46
End: 2021-03-26

## 2021-03-26 NOTE — TELEPHONE ENCOUNTER
Pt calling for CT results  States she obtained a disc with the images and her  (who is an ER attending) reviewed them  States he noted fogginess in her lungs and some concerning spots  States she has a white haze at the bottom of her lungs with black flecks  She is very concerned and anxious for results as her mother has stage 4 lung cancer  Notes she is still having SOB  Reports sore throat and feeling hoarse  Pt made aware results are still pending  She would like a call back when these are available  Dr Lesia Arredondo, CT chest still pending  Pt is concerned about findings, see above  Please advise once results are received  [phone]  Okay to leave limited detailed message

## 2021-03-26 NOTE — TELEPHONE ENCOUNTER
Patient called again regarding MRI results  Advised patient that the MRI report has not been finalized yet

## 2021-03-29 NOTE — TELEPHONE ENCOUNTER
Spoke to Grant George at the reading room at [facility]  She advises pt CT images has not been read, she will see if the radiologist reads them (no time frame given) for results

## 2021-03-30 ENCOUNTER — TELEMEDICINE (OUTPATIENT)
Dept: NEUROLOGY | Facility: CLINIC | Age: 46
End: 2021-03-30
Payer: COMMERCIAL

## 2021-03-30 VITALS — BODY MASS INDEX: 32.14 KG/M2 | HEIGHT: 66 IN | WEIGHT: 200 LBS

## 2021-03-30 DIAGNOSIS — R20.0 NUMBNESS IN BOTH LEGS: ICD-10-CM

## 2021-03-30 DIAGNOSIS — R20.0 NUMBNESS AND TINGLING IN LEFT HAND: Primary | ICD-10-CM

## 2021-03-30 DIAGNOSIS — G40.309 NONINTRACTABLE GENERALIZED IDIOPATHIC EPILEPSY WITHOUT STATUS EPILEPTICUS (HCC): ICD-10-CM

## 2021-03-30 DIAGNOSIS — R20.2 NUMBNESS AND TINGLING IN LEFT HAND: Primary | ICD-10-CM

## 2021-03-30 DIAGNOSIS — G93.5 CHIARI I MALFORMATION (HCC): ICD-10-CM

## 2021-03-30 PROCEDURE — 99215 OFFICE O/P EST HI 40 MIN: CPT | Performed by: PSYCHIATRY & NEUROLOGY

## 2021-03-30 PROCEDURE — 1036F TOBACCO NON-USER: CPT | Performed by: PSYCHIATRY & NEUROLOGY

## 2021-03-30 PROCEDURE — 3008F BODY MASS INDEX DOCD: CPT | Performed by: PSYCHIATRY & NEUROLOGY
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
## 2021-04-02 ENCOUNTER — TELEPHONE (OUTPATIENT)
Dept: NEUROLOGY | Facility: CLINIC | Age: 46
End: 2021-04-02

## 2021-04-02 NOTE — TELEPHONE ENCOUNTER
MD Luis Fernando Baldwin; Yamileth Hinton MD   Caller: Unspecified (3 days ago,  8:16 AM)             I am following Raúl Salgado in the office for sensory dysfunction arms and legs to r/o MS, patient has no MS/CNS demyelination     I would like if you could see the patient for generalized epilepsy, she had established care with [facility] neurology for this matter     The intent of your evaluation would be for a transfer of care  If agreeable, the patient should be scheduled with specialty attending and previously scheduled follow up appointments scheduled with me (transferring doctor) will be cancelled  Patient's  is a Radiologist, patient prefers to follow with Dr Elliott Whitmore and is not interested to be seeing by APs  Thanks,     Kevin Blanco MD       APPROVAL PENDING: DO NOT SCHEDULE UNTIL APPROVED         MD Kevin Page MD; Fátima Stratton             May scheduled a new patient visit at next available appointment

## 2021-04-27 ENCOUNTER — TRANSCRIBE ORDERS (OUTPATIENT)
Dept: ADMINISTRATIVE | Facility: HOSPITAL | Age: 46
End: 2021-04-27

## 2021-04-27 DIAGNOSIS — R22.9 LOCALIZED SWELLING, MASS AND LUMP, UNSPECIFIED: Primary | ICD-10-CM

## 2021-04-29 ENCOUNTER — TELEPHONE (OUTPATIENT)
Dept: GASTROENTEROLOGY | Facility: CLINIC | Age: 46
End: 2021-04-29

## 2021-05-06 ENCOUNTER — TELEMEDICINE (OUTPATIENT)
Dept: GASTROENTEROLOGY | Facility: CLINIC | Age: 46
End: 2021-05-06
Payer: COMMERCIAL

## 2021-05-06 DIAGNOSIS — R74.8 ELEVATED ALKALINE PHOSPHATASE LEVEL: ICD-10-CM

## 2021-05-06 DIAGNOSIS — K21.9 LARYNGOPHARYNGEAL REFLUX (LPR): Primary | ICD-10-CM

## 2021-05-06 DIAGNOSIS — R63.5 WEIGHT GAIN: ICD-10-CM

## 2021-05-06 PROCEDURE — 99214 OFFICE O/P EST MOD 30 MIN: CPT | Performed by: PHYSICIAN ASSISTANT

## 2021-05-06 RX ORDER — PANTOPRAZOLE SODIUM 40 MG/1
40 TABLET, DELAYED RELEASE ORAL DAILY
Qty: 90 TABLET | Refills: 2 | Status: SHIPPED | OUTPATIENT
Start: 2021-05-06

## 2021-05-06 NOTE — PROGRESS NOTES
Virtual Brief Visit    Assessment/Plan:    1  Elevated alkaline phosphatase   Elevated to 187 in January in the setting of a positive p-ANCA and  mildly elevated ESR so she underwent a full liver workup which was essentially normal aside for mild elevation of the ceruloplasmin and alpha 1 antitrypsin  She also had an MRI with limited examination due to excessive respiratory motion and a 1 cm hepatic mass most likely a hemangioma  - no further workup at this time      2  LPR and reflux symptoms   She had intermittent dry throat and voice change  EGD with mild esophagitis in the distal esophagus and mild gastritis  All path normal aside for esophagus biopsies revealing positive fungal element  With her cytology revealing atypical cellular changes  She is status post 2 weeks of fluconazole and has also finished her proton pump inhibitors  She states that her dry throat /sore throat and raspy voice is coming back  - resume PPI  - repeat EGD the end of June as planned     3  Colon cancer screening   Colonoscopy 2/25/21 with normal colon and terminal ileum  Biopsy taken to rule out microscopic colitis  Two small polyps removed from transverse colon  Small internal hemorrhoids  Random colon biopsies normal, polyps were tubular adenomas  Repeat 5 years from last   - repeat colonoscopy 2/2026 or sooner if clinically indicated    FU after EGD in June       Problem List Items Addressed This Visit        Digestive    Laryngopharyngeal reflux (LPR) - Primary    Relevant Medications    pantoprazole (PROTONIX) 40 mg tablet      Other Visit Diagnoses     Elevated alkaline phosphatase level        Relevant Orders    Comprehensive metabolic panel    Weight gain        Relevant Orders    TSH, 3rd generation with Free T4 reflex              Reason for visit is No chief complaint on file         Encounter provider John Morocho PA-C    Provider located at [facility] [address]  [place] [address] [id number]  [phone]    Recent Visits  Date Type Provider Dept   04/29/21 Telephone [place]   Showing recent visits within past 7 days and meeting all other requirements     Today's Visits  Date Type Provider Dept   05/06/21 Telemedicine [facility], [facility] [address] [facility] today's visits and meeting all other requirements     Future Appointments  No visits were found meeting these conditions  Showing future appointments within next 150 days and meeting all other requirements        After connecting through telephone and patient was informed that this is not a secure, HIPAA-compliant platform  She agrees to proceed  , the patient was identified by name and date of birth  Lanora Goldberg was informed that this is a telemedicine visit and that the visit is being conducted through telephone and patient was informed that this is not a secure, HIPAA-compliant platform  She agrees to proceed  My office door was closed  No one else was in the room  She acknowledged consent and understanding of privacy and security of the platform  The patient has agreed to participate and understands she can discontinue the visit at any time  Patient is aware this is a billable service  Subjective    Lanora Goldberg is a 41 y o  female here for follow up evaluation of an elevated alkaline phosphatase, LPR and reflux symptoms, and colon cancer screening  Her alkaline phosphatase was elevated to 187  She also had a positive p-ANCA and mildly elevated ESR so she underwent a full liver workup which was essentially normal aside for mild elevation of the ceruloplasmin and alpha 1 antitrypsin  She also had an MRI with limited examination due to excessive respiratory motion and a 1 cm hepatic mass most likely a hemangioma   For her acid reflux symptoms and colon cancer screening she underwent both EGD and colonoscopy 21  EGD revealed abnormal mucosa in the lower 3rd of the esophagus thought to be secondary to Candida  She also had mild erythema in the body of the stomach  Pathology was consistent with a Candida infection  Her colonoscopy revealed a normal colon and terminal ileum, small internal hemorrhoids , and 2 polyps  Pathology was positive for tubular adenomas and she also had random colon biopsies which were within normal limits  The patient completed a 2 week course of flu condoms all and she is also since stop taking her proton pump inhibitors  She states she is doing relatively well aside for some return of a raspy voice and a sore throat      Past Medical History:   Diagnosis Date    Interstitial cystitis     Migraines     Seizures ([facility] [address] )        Past Surgical History:   Procedure Laterality Date     SECTION      UPPER GASTROINTESTINAL ENDOSCOPY         Current Outpatient Medications   Medication Sig Dispense Refill    dexamethasone (DECADRON) 4 mg tablet       Dichloralphenazone POWD Use      famotidine (PEPCID) 20 mg tablet Take 1 tablet (20 mg total) by mouth daily 30 tablet 4    gabapentin (NEURONTIN) 100 mg capsule Take 1 capsule (100 mg total) by mouth daily 30 capsule 0    levonorgestrel-ethinyl estradiol (SEASONALE) 0 15-0 03 MG per tablet Take 1 tablet by mouth daily      omeprazole (PriLOSEC) 40 MG capsule Take 1 capsule (40 mg total) by mouth daily (Patient not taking: Reported on 3/12/2021) 60 capsule 2    pantoprazole (PROTONIX) 40 mg tablet Take 1 tablet (40 mg total) by mouth daily 90 tablet 2    quiNINE (QUALAQUIN) 324 mg capsule Take 648 mg by mouth 2 (two) times a day as needed      sertraline (ZOLOFT) 50 mg tablet Take 1 tablet (50 mg total) by mouth daily 90 tablet 2    SUMAtriptan-naproxen (TREXIMET)  MG per tablet Take 1 tablet by mouth every 2 (two) hours as needed      topiramate (TOPAMAX) 200 MG tablet Take 1 tablet (200 mg total) by mouth daily 90 tablet 3    traZODone (DESYREL) 50 mg tablet Take 1 tablet (50 mg total) by mouth daily at bedtime as needed for sleep 15 tablet 0    zolpidem (AMBIEN) 5 mg tablet Take 1 tablet (5 mg total) by mouth daily at bedtime as needed for sleep 15 tablet 0     No current facility-administered medications for this visit  Allergies   Allergen Reactions    Tetracycline Rash     Other reaction(s): Other (See Comments)  throat closes       Review of Systems   Constitutional: Negative for activity change, appetite change, chills, fatigue, fever and unexpected weight change  HENT: Positive for sore throat  Negative for trouble swallowing  Gastrointestinal: Negative for abdominal distention, abdominal pain, anal bleeding, blood in stool, constipation, diarrhea, nausea, rectal pain and vomiting  Musculoskeletal: Negative for back pain and gait problem  Psychiatric/Behavioral: Negative for confusion  There were no vitals filed for this visit  It was my intent to perform this visit via video technology but the patient was not able to do a video connection so the visit was completed via audio telephone only  I spent 30 minutes with patient today in which greater than 50% of the time was spent in counseling/coordination of care regarding GERD, elevated alk phos, colon cancer screening    [address] [place] [address] acknowledges that she has consented to an online visit or consultation  She understands that the online visit is based solely on information provided by her, and that, in the absence of a face-to-face physical evaluation by the physician, the diagnosis she receives is both limited and provisional in terms of accuracy and completeness  This is not intended to replace a full medical face-to-face evaluation by the physician  Bobby Hernandez understands and accepts these terms

## 2021-05-15 ENCOUNTER — HOSPITAL ENCOUNTER (OUTPATIENT)
Dept: MRI IMAGING | Facility: HOSPITAL | Age: 46
Discharge: HOME | End: 2021-05-15
Attending: PODIATRIST
Payer: COMMERCIAL

## 2021-05-15 ENCOUNTER — APPOINTMENT (OUTPATIENT)
Dept: LAB | Facility: HOSPITAL | Age: 46
End: 2021-05-15
Attending: PODIATRIST
Payer: COMMERCIAL

## 2021-05-15 ENCOUNTER — APPOINTMENT (OUTPATIENT)
Dept: LAB | Facility: HOSPITAL | Age: 46
End: 2021-05-15
Payer: COMMERCIAL

## 2021-05-15 DIAGNOSIS — R74.8 ELEVATED ALKALINE PHOSPHATASE LEVEL: ICD-10-CM

## 2021-05-15 DIAGNOSIS — R22.9 LOCALIZED SWELLING, MASS AND LUMP, UNSPECIFIED: ICD-10-CM

## 2021-05-15 DIAGNOSIS — R63.5 WEIGHT GAIN: ICD-10-CM

## 2021-05-15 DIAGNOSIS — B37.81 ESOPHAGEAL CANDIDIASIS (HCC): ICD-10-CM

## 2021-05-15 LAB
ALBUMIN SERPL BCP-MCNC: 3 G/DL (ref 3.5–5)
ALP SERPL-CCNC: 185 U/L (ref 46–116)
ALT SERPL W P-5'-P-CCNC: 21 U/L (ref 12–78)
ANION GAP SERPL CALCULATED.3IONS-SCNC: 11 MMOL/L (ref 4–13)
AST SERPL W P-5'-P-CCNC: 18 U/L (ref 5–45)
BILIRUB SERPL-MCNC: 0.17 MG/DL (ref 0.2–1)
BUN SERPL-MCNC: 14 MG/DL (ref 5–25)
BUN SERPL-MCNC: 14 MG/DL (ref 5–25)
CALCIUM ALBUM COR SERPL-MCNC: 9.6 MG/DL (ref 8.3–10.1)
CALCIUM SERPL-MCNC: 8.8 MG/DL (ref 8.3–10.1)
CHLORIDE SERPL-SCNC: 109 MMOL/L (ref 100–108)
CO2 SERPL-SCNC: 21 MMOL/L (ref 21–32)
CREAT SERPL-MCNC: 0.7 MG/DL (ref 0.6–1.3)
CREAT SERPL-MCNC: 0.75 MG/DL (ref 0.6–1.3)
GFR SERPL CREATININE-BSD FRML MDRD: 106 ML/MIN/1.73SQ M
GFR SERPL CREATININE-BSD FRML MDRD: 97 ML/MIN/1.73SQ M
GLUCOSE SERPL-MCNC: 121 MG/DL (ref 65–140)
POTASSIUM SERPL-SCNC: 3.6 MMOL/L (ref 3.5–5.3)
PROT SERPL-MCNC: 7.5 G/DL (ref 6.4–8.2)
SODIUM SERPL-SCNC: 141 MMOL/L (ref 136–145)
TSH SERPL DL<=0.05 MIU/L-ACNC: 1.4 UIU/ML (ref 0.36–3.74)

## 2021-05-15 PROCEDURE — A9585 GADOBUTROL INJECTION: HCPCS | Performed by: PODIATRIST

## 2021-05-15 PROCEDURE — 82565 ASSAY OF CREATININE: CPT

## 2021-05-15 PROCEDURE — 84520 ASSAY OF UREA NITROGEN: CPT

## 2021-05-15 PROCEDURE — 84443 ASSAY THYROID STIM HORMONE: CPT

## 2021-05-15 PROCEDURE — 80053 COMPREHEN METABOLIC PANEL: CPT

## 2021-05-15 PROCEDURE — 73723 MRI JOINT LWR EXTR W/O&W/DYE: CPT

## 2021-05-15 PROCEDURE — 36415 COLL VENOUS BLD VENIPUNCTURE: CPT

## 2021-05-15 RX ADMIN — GADOBUTROL 10 ML: 604.72 INJECTION INTRAVENOUS at 11:12

## 2021-05-24 ENCOUNTER — TELEPHONE (OUTPATIENT)
Dept: NEUROLOGY | Facility: CLINIC | Age: 46
End: 2021-05-24

## 2021-05-24 NOTE — TELEPHONE ENCOUNTER
I called and left a voicemail message about patients upcoming appointment with Dr Hearn on 6/11/21 @ 4 pm   I requested a call back to our office if the patient has any issues or concerns or cannot keep this appointment

## 2021-05-27 DIAGNOSIS — F51.04 PSYCHOPHYSIOLOGICAL INSOMNIA: ICD-10-CM

## 2021-05-27 DIAGNOSIS — G40.309 NONINTRACTABLE GENERALIZED IDIOPATHIC EPILEPSY WITHOUT STATUS EPILEPTICUS (HCC): ICD-10-CM

## 2021-05-27 NOTE — TELEPHONE ENCOUNTER
Pt is requesting Kevin Blanco MD, to cruzito reach out to:  [address] [facility], [address] [place] [address]   Phone:  [phone]     Regarding:  topiramate (TOPAMAX) 200 MG tablet and  sertraline (ZOLOFT) 50 mg tablet  Pt states the incorrect  Sig: was written for both medications and the pharmacy would like to review this  Pt would like a call back when this is completed, she would also like to inform you she will be out of medications this weekend

## 2021-05-27 NOTE — TELEPHONE ENCOUNTER
Message left for patient to return call to office  Need clarification for correct dosing/directions

## 2021-06-01 NOTE — TELEPHONE ENCOUNTER
Pt reports she has been taking Topamax 200mg tabs 1 tab BID and Zoloft 50mg tabs 2 5 tabs daily  Scripts pended  Please review and sign if agreeable

## 2021-06-11 ENCOUNTER — OFFICE VISIT (OUTPATIENT)
Dept: NEUROLOGY | Facility: CLINIC | Age: 46
End: 2021-06-11
Payer: COMMERCIAL

## 2021-06-11 VITALS
SYSTOLIC BLOOD PRESSURE: 110 MMHG | BODY MASS INDEX: 33.91 KG/M2 | DIASTOLIC BLOOD PRESSURE: 70 MMHG | HEIGHT: 66 IN | HEART RATE: 103 BPM | WEIGHT: 211 LBS

## 2021-06-11 DIAGNOSIS — G43.109 MIGRAINE WITH AURA AND WITHOUT STATUS MIGRAINOSUS, NOT INTRACTABLE: ICD-10-CM

## 2021-06-11 DIAGNOSIS — G40.309 NONINTRACTABLE GENERALIZED IDIOPATHIC EPILEPSY WITHOUT STATUS EPILEPTICUS (HCC): Primary | ICD-10-CM

## 2021-06-11 DIAGNOSIS — F41.9 ANXIETY: ICD-10-CM

## 2021-06-11 PROBLEM — M79.642 LEFT HAND PAIN: Status: RESOLVED | Noted: 2019-09-12 | Resolved: 2021-06-11

## 2021-06-11 PROBLEM — M62.838 MUSCLE SPASM: Status: ACTIVE | Noted: 2018-04-30

## 2021-06-11 PROBLEM — N30.10 IC (INTERSTITIAL CYSTITIS): Status: ACTIVE | Noted: 2018-04-30

## 2021-06-11 PROBLEM — R20.2 NUMBNESS AND TINGLING IN LEFT HAND: Status: RESOLVED | Noted: 2019-09-12 | Resolved: 2021-06-11

## 2021-06-11 PROBLEM — B37.81 ESOPHAGEAL CANDIDIASIS (HCC): Status: RESOLVED | Noted: 2021-03-12 | Resolved: 2021-06-11

## 2021-06-11 PROBLEM — R20.0 NUMBNESS AND TINGLING IN LEFT HAND: Status: RESOLVED | Noted: 2019-09-12 | Resolved: 2021-06-11

## 2021-06-11 PROBLEM — R20.0 NUMBNESS IN BOTH LEGS: Status: RESOLVED | Noted: 2019-09-12 | Resolved: 2021-06-11

## 2021-06-11 PROBLEM — Q87.89: Status: ACTIVE | Noted: 2021-06-11

## 2021-06-11 PROBLEM — R10.2 PELVIC PAIN IN FEMALE: Status: ACTIVE | Noted: 2018-01-23

## 2021-06-11 PROCEDURE — 1036F TOBACCO NON-USER: CPT | Performed by: PSYCHIATRY & NEUROLOGY

## 2021-06-11 PROCEDURE — 99417 PROLNG OP E/M EACH 15 MIN: CPT | Performed by: PSYCHIATRY & NEUROLOGY

## 2021-06-11 PROCEDURE — 3008F BODY MASS INDEX DOCD: CPT | Performed by: PSYCHIATRY & NEUROLOGY

## 2021-06-11 PROCEDURE — 99215 OFFICE O/P EST HI 40 MIN: CPT | Performed by: PSYCHIATRY & NEUROLOGY

## 2021-06-11 RX ORDER — FOLIC ACID 1 MG/1
1 TABLET ORAL DAILY
Qty: 90 TABLET | Refills: 3 | Status: SHIPPED | OUTPATIENT
Start: 2021-06-11

## 2021-06-11 RX ORDER — LEVONORGESTREL AND ETHINYL ESTRADIOL 0.15-0.03
1 KIT ORAL DAILY
COMMUNITY

## 2021-06-11 RX ORDER — TOPIRAMATE 100 MG/1
150 TABLET, FILM COATED ORAL 2 TIMES DAILY
Qty: 270 TABLET | Refills: 1 | Status: SHIPPED | OUTPATIENT
Start: 2021-06-11 | End: 2021-11-26 | Stop reason: SDUPTHER

## 2021-06-11 RX ORDER — CLONAZEPAM 0.5 MG/1
TABLET ORAL
Qty: 5 TABLET | Refills: 0 | Status: SHIPPED | OUTPATIENT
Start: 2021-06-11 | End: 2022-04-27 | Stop reason: SDUPTHER

## 2021-06-11 NOTE — PROGRESS NOTES
[address] [facility]  Patient's Name: Ravinder Sandoval   Patient's : 1975   Visit Type: follow-up  Referring MD / PCP:  [place],     Assessment:  Ms Ravinder Sandoval is a 41 y  o  woman who had absence type of seizures as child with recurrence of seizure more than a decade later  She has significant number of family members with what seems to involve generalized seizures (absence and generalized tonic clonic seizures)  She likely has an underlying genetic generalized epilepsy syndrome  I would like at least an EEG study but given that she has not had a seizure for many years, it would not change current treatment plan  Her epilepsy is complicated by medication side effects with topiramate causing paresthesia and cognitive impairments  It is not clear to me as to how her dose of topiramate was escalated to 200mg twice a day (typically, I would start at 100mg twice a day, higher doses tend to cause more side effects)  But it could have been her severe recurrent migraines that led to high dose of topiramate  During this visit, I did suggest that we reduce the dose of topiramate to alleviate side effects  If her headaches worsen we may try another migraine specific medication (such as propranolol or tricyclic antidepressant)  She may continue with clonazepam (which was prescribed in the past) for management of anxiety attacks  If she was to have a breakthrough seizure with dose reduction of topiramate, the [facility], but we can ask that she is given a waiver from license suspension given that the dose was reduced on my recommendation          Plan:   1 - decrease topiramate to 150mg (one and a half tab 100mg tab) in the morning and 200mg at bedtime  May continue this until no more 200mg tabs or in 3 months (break 200mg tabs in half) to reduce dose to 150mg twice a day  2 - next follow-up check topiramate level  3 - call office to inform us if you have sumatriptan only plus dose or combination of sumatriptan with naproxen  4 - keep a calendar of your migraine headache frequency and treatment  This may inform us if migraines worsen with reduction of topiramate  5 - take one clonazepam 0 5mg tab as needed for anxiety attack  5 - follow-up 3 months  Problem List Items Addressed This Visit        Cardiovascular and Mediastinum    Migraine with aura and without status migrainosus, not intractable    Relevant Medications    topiramate (TOPAMAX) 100 mg tablet    clonazePAM (KlonoPIN) 0 5 mg tablet       Nervous and Auditory    Nonintractable generalized 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 ([facility] [address] ) - Primary    Relevant Medications    topiramate (TOPAMAX) 261 mg tablet    folic acid (FOLVITE) 1 mg tablet    clonazePAM (KlonoPIN) 0 5 mg tablet    Other Relevant Orders    Topiramate level       Other    Anxiety    Relevant Medications    clonazePAM (KlonoPIN) 0 5 mg tablet          Chief Complaint:   Chief Complaint   Patient presents with    Migraine    Seizures      HPI:    Autumn Hinds is a 41 y o  right handed female here for follow-up evaluation of epilepsy, migraines,  and paresthesia  Intake History 6/11/2021  She was diagnosed with absence seizures when she was 10years old, she was put on Depakote, there were no further seizures  She noticed that she was losing her hair on Depakote  She was off medication since she was 16years old  She was off AED for many years until she was 30years old  When she was 30years old, six months after she had her son, she sent a an unusual text message to her  with random numbers  He called her back to find out what happened  She does not remember what happened  She noticed that she had showered but she does not recall showering  She recalled biting her tongue  It was suspected that she had a grand mal seizure  She saw Dr Danny Brambila at [facility] staring around 2003/2004    Probably due to her migraine headaches, she was put on topiramate to manage both conditions  She was planning a  pregnant at 28years old, transitioned from topiramate to lamotrigine  She unfortunately had a seizure  She lost this pregnancy due to genetic disorder  Then 4 months later she was pregnant with twins; she had stayed on lamotrigine and was constantly checking her lamotrigine levels  After she delivered her twins, she went right back on topiramate  When she is on topiramate, she struggles with word finding difficulty (I feel stupid)  She does not recall if lamotrigine was causing significant side effects  Every since 2006, she has been on topiramate  She has always been on topiramate 200mg twice a day; she does not recall ever trying a lower dose of medication  One night she woke up in a panic, like the world was going to end, she had difficulty thinking and putting her thoughts together, she was not able to calm down her nerves or thoughts, mind was racing like crazy, panic feeling lasted 5 minutes, she was worried that a seizure was going to happen  She was fairly coherent, but did not progress to altered awareness  She has had anxiety attacks in the past, she gets aura of panic, which then can result in a migraine headaches  She started to have migraines around middle school  She gets migraines that can last a week, just before her menses or weather changes  Headache is so severe she has to go into a hole, severely nauseated  Head pressure sensation over the front of head or back of the neck  She will try Advil 2 tabs to take the edge off and Coke or coffee  When she was pregnant she did not have migraines  Topiramate may help with migraines  She does not get a lot of migraines (maybe 5-6 in the last 6 months)  If her migraines are severe she has "major" medication, compound medication that cost $400    (Looking at the list of medication dichloralphenazone is no longer available in the [place], which contains chloral hydrate )  She had tried Maxalt but it did not work  It may have been sumatriptan  She may have used dexamethasone to abort headaches  She is currently on sumatriptan  Prior records from [facility]:  Last visit 2020 - she was prescribed Midrin (isometheptene-acetaminophen-dichloralphenazone) and Treximet for migraines  (she was having 5-10 days/month with headache, required steroids, under a lot of stress )  She has restless legs, previously prescribed Quinine but she just drinks tonic water now  Last seizure was in ; possible aura of panic, whole body warmth sensation  Visit on 2018 - she was having 1-2 migraines/month    AED/side effects/compliance:  topiramate 200-200  Paresthesia, cognitive impairment    Event/Seizure semiology:  1  Absence seizures  2   Possible generalized tonic clonic seizure    Woman of childbearing age with Epilepsy:  Contraception: Seasonale  Folic acid supplement:  No    Prior Epilepsy History:  x    Special Features  Status epilepticus: No  Self Injury Seizures: No  Precipitating Factors: None  Post-ictal state: confusion    Epilepsy Risk Factors:  Abnormal pregnancy: No  Abnormal birth/: No  Abnormal Development: No  Febrile seizures, simple: No  Febrile seizures, complex: No  CNS infection: No  Mental retardation: No  Cerebral palsy: No  Head injury (moderate/severe): No  CNS neoplasm: No  CNS malformation: No  Neurosurgical procedure: No  Stroke: No  CNS autoimmune disorder: No  Alcohol abuse: No  Drug abuse: No  Family history Sz/epilepsy: her son (absence and grand mal seizures) and and one of her twin daughters (grand mal seizure at 3years old); brother with febrile seizure; one brother with seizure    Prior AEDs:  medication Max dose Time used Reason to stop   divalpreox   Hair loss   lamotrigine      topiramate      Tegretol (as a child)        PRIOR Migraine trials  Abortives - Frova (side effects), Maxalt and Zomig (no help), advil, Zofran, tylenol, flexerile    Prior workup:  x  Imagin2019 - MRI brain  4-5 tiny foci of T2/FLAIR hyperintensity scattered in the subcortical white matter of both hemispheres      8/10/2015 - MRI brain [facility])  Right cerebellar tonsil protrudes through the foramen magnum unchanged from MRI 2010    EEGs:  None available    Labs:  Component      Latest Ref Rng & Units 2021 5/15/2021   Sodium      136 - 145 mmol/L 138 141   Potassium      3 5 - 5 3 mmol/L 3 7 3 6   Chloride      100 - 108 mmol/L 104 109 (H)   CO2      21 - 32 mmol/L 25 21   Anion Gap      4 - 13 mmol/L 9 11   BUN      5 - 25 mg/dL 16 14   Creatinine      0 60 - 1 30 mg/dL 0 71 0 70   GLUCOSE FASTING      65 - 99 mg/dL 90    Calcium      8 3 - 10 1 mg/dL 9 4 8 8   CORRECTED CALCIUM      8 3 - 10 1 mg/dL 9 9 9 6   AST      5 - 45 U/L 23 18   ALT      12 - 78 U/L 31 21   Alkaline Phosphatase      46 - 116 U/L 187 (H) 185 (H)   Total Protein      6 4 - 8 2 g/dL 8 5 (H) 7 5   Albumin      3 5 - 5 0 g/dL 3 4 (L) 3 0 (L)   TOTAL BILIRUBIN      0 20 - 1 00 mg/dL 0 23 0 17 (L)   eGFR      ml/min/1 73sq m 104 106   Glucose, Random      65 - 140 mg/dL  121   ALKALINE PHOSPHATASE ISOENZYMES      39 - 117 IU/L 183 (H)    ALKALINE PHOSPHATASE LIVER FRACTION      18 - 85 % 58    ALKALINE PHOSPHATASE BONE FRACTION      14 - 68 % 30    ALKALINE PHOSPHATASE INTESTINE FRACTION      0 - 18 % 12    IGA      70 0 - 400 0 mg/dL 366 0    IGG      700 0-1,600 0 mg/dL 1,470 0    IGM      40 0 - 230 0 mg/dL 138 0    Iron Saturation      % 30    TIBC      250 - 450 ug/dL 394    Iron      50 - 170 ug/dL 119    SMOOTH MUSCLE ANTIBODY      0 - 19 Units 5    ALPHA-1 ANTITRYPSIN      101 - 187 mg/dL 198 (H)    ANTI-NUCLEAR ANTIBODY (DANG)      Negative Negative    MITOCHONDRIAL ANTIBODY      0 0 - 20 0 Units <20 0    C-REACTIVE PROTEIN      <3 0 mg/L 19 0 (H)    Ferritin      8 - 388 ng/mL 47    TSH 3RD GENERATON      0 358 - 3 740 uIU/mL  1 396       General exam   BP 110/70 (BP Location: Right arm, Patient Position: Sitting, Cuff Size: Large)   Pulse 103   Ht 5' 6" (1 676 m)   Wt 95 7 kg (211 lb)   BMI 34 06 kg/m²    Appearance: normally developed, appears well  Carotids: no bruits present  Cardiovascular: regular rate and rhythm and normal heart sounds  Pulmonary: clear to auscultation  Abdominal: obese, nontender, nondistended  Extremities: no edema    HEENT: anicteric and moist mucus membranes / oral cavity   Fundoscopy: bilateral optic discs are sharp    Mental status  Orientation: alert and oriented to name, place, time  Fund of Knowledge: intact   Attention and Concentration: able to spell HOUSE forwards and backwards  Current and Remote Memory:intact  Language: spontaneous speech is normal and comprehension is intact    Cranial Nerves  CN 1: not tested  CN 2: Visual fields intact to confrontation and pupils equal round reactive to direct and consenual light   CN 3, 4, 6: EOMI, no nystagmus  CN 5:sensation intact to all distribution V1, V2, V3  CN 7:muscles of facial expression are symmetric  CN 8:symmetric to finger rubs bilaterally  CN 9, 10:no dysarthria present  CN 11:symmetric SCM with head turns and symmetric shoulder shrug  CN 12:tongue is midline    Motor:  Bulk, Tone: normal bulk, normal tone  Pronation: no pronator drift  Strength: Symmetric strength of the arms and legs, no lateralizing weakness   Abnormal movements: no abnormal movements are present    Sensory:  Pinprick: intact in all limbs  Romberg:normal    Coordination:  FNF:FNF bilaterally intact  DANIS:intact  FFM:intact  Gait/Station:normal gait and normal tandem gait    Reflexes:  Reflexes were hypoactive (1+/4) and symmetric, tested bilateral biceps, triceps, brachiradialis, patellar, ankles    Past Medical/Surgical History:  Patient Active Problem List   Diagnosis    Migraine with aura and without status migrainosus, not intractable    Laryngopharyngeal reflux (LPR)    Antineutrophil cytoplasmic antibody (ANCA) positive    Psychophysiological insomnia    Nonintractable generalized 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 ([facility] [address] )    Anxiety    Non morbid obesity due to excess calories    Depression    Candida esophagitis (HCC)    Chiari I malformation (HCC)    Pelvic pain in female    Muscle spasm    IC (interstitial cystitis)    BOR syndrome     Past Medical History:   Diagnosis Date    Interstitial cystitis     Migraines     Seizures ([facility] [address] )      Past Surgical History:   Procedure Laterality Date     SECTION      UPPER GASTROINTESTINAL ENDOSCOPY       Past Psychiatric History:  Depression: yes  Anxiety: yes  Psychosis: No    Medications:    Current Outpatient Medications:     levonorgestrel-ethinyl estradiol (Jolessa) 0 15-0 03 MG per tablet, Take 1 tablet by mouth daily, Disp: , Rfl:     pantoprazole (PROTONIX) 40 mg tablet, Take 1 tablet (40 mg total) by mouth daily, Disp: 90 tablet, Rfl: 2    sertraline (ZOLOFT) 50 mg tablet, Take 2 5 tablets (125 mg total) by mouth daily, Disp: 225 tablet, Rfl: 3    SUMAtriptan-naproxen (TREXIMET)  MG per tablet, Take 1 tablet by mouth every 2 (two) hours as needed, Disp: , Rfl:     topiramate (TOPAMAX) 100 mg tablet, Take 1 5 tablets (150 mg total) by mouth 2 (two) times a day, Disp: 270 tablet, Rfl: 1    clonazePAM (KlonoPIN) 0 5 mg tablet, Take one tab by mouth for severe panic attack  , Disp: 5 tablet, Rfl: 0    folic acid (FOLVITE) 1 mg tablet, Take 1 tablet (1 mg total) by mouth daily, Disp: 90 tablet, Rfl: 3    Allergies: Allergies   Allergen Reactions    Tetracycline Rash     Other reaction(s):  Other (See Comments)  throat closes       Family history:  Family History   Problem Relation Age of Onset    Lung cancer Mother     Multiple sclerosis Sister     Seizures Brother     Seizures Paternal Grandmother        Social History  Living situation:  Lives with family  Work:  [employer]  Driving:  Yes   reports that she has never smoked  She has never used smokeless tobacco  She reports current alcohol use  She reports that she does not use drugs  Review of Systems  A review of at least 12 organ/systems was obtained by the medical assistant and reviewed by me, including additional positives/negatives:  Neurological: Positive for numbness  Negative for seizures and syncope  Patient states hands and feet get numb  Decision making was of high-complexity due to the patient's high risk condition (seizures), psychiatric and neuropsychological comorbidities, behavioral problems, memory and cognitive problems and medication side effects  The total amount of time spent with the patient along with pre-chart and post-chart preparation was 74 minutes on the calendar day of the date of service  This included history taking, physical exam, review of ancillary testing, counseling provided to the patient regarding diagnosis, medications, treatment, and risk management, and other communication to the patient's providers and/or family  Start time: 4:40PM  End time: 5:54PM    The [place] PDMP was queried  No red flags were identified  The patient is low risk for abuse of the prescribed clonazepam medication  Safe to proceed with prescription

## 2021-06-11 NOTE — PATIENT INSTRUCTIONS
Plan:   1 - decrease topiramate to 150mg (one and a half tab 100mg tab) in the morning and 200mg at bedtime  May continue this until no more 200mg tabs or in 3 months (break 200mg tabs in half) to reduce dose to 150mg twice a day  2 - next follow-up check topiramate level  3 - call office to inform us if you have sumatriptan only plus dose or combination of sumatriptan with naproxen  4 - keep a calendar of your migraine headache frequency and treatment  This may inform us if migraines worsen with reduction of topiramate  5 - take one clonazepam 0 5mg tab as needed for anxiety attack  5 - follow-up 3 months

## 2021-06-11 NOTE — PROGRESS NOTES
Review of Systems   Constitutional: Negative for chills and fever  HENT: Negative for ear pain and sore throat  Eyes: Negative for pain and visual disturbance  Respiratory: Negative for cough and shortness of breath  Cardiovascular: Negative for chest pain and palpitations  Gastrointestinal: Negative for abdominal pain and vomiting  Genitourinary: Negative for dysuria and hematuria  Musculoskeletal: Negative for arthralgias and back pain  Skin: Negative for color change and rash  Neurological: Positive for numbness  Negative for seizures and syncope  Patient states hands and feet get numb  All other systems reviewed and are negative

## 2021-06-28 ENCOUNTER — TELEPHONE (OUTPATIENT)
Dept: GASTROENTEROLOGY | Facility: CLINIC | Age: 46
End: 2021-06-28

## 2021-06-28 ENCOUNTER — ANESTHESIA EVENT (OUTPATIENT)
Dept: ANESTHESIOLOGY | Facility: HOSPITAL | Age: 46
End: 2021-06-28

## 2021-06-28 ENCOUNTER — ANESTHESIA (OUTPATIENT)
Dept: ANESTHESIOLOGY | Facility: HOSPITAL | Age: 46
End: 2021-06-28

## 2021-07-23 ENCOUNTER — TELEPHONE (OUTPATIENT)
Dept: GASTROENTEROLOGY | Facility: AMBULARY SURGERY CENTER | Age: 46
End: 2021-07-23

## 2021-11-26 ENCOUNTER — TELEPHONE (OUTPATIENT)
Dept: NEUROLOGY | Facility: CLINIC | Age: 46
End: 2021-11-26

## 2021-11-26 DIAGNOSIS — G40.309 NONINTRACTABLE GENERALIZED IDIOPATHIC EPILEPSY WITHOUT STATUS EPILEPTICUS (HCC): ICD-10-CM

## 2021-11-26 RX ORDER — TOPIRAMATE 100 MG/1
150 TABLET, FILM COATED ORAL 2 TIMES DAILY
Qty: 270 TABLET | Refills: 2 | Status: SHIPPED | OUTPATIENT
Start: 2021-11-26 | End: 2022-04-27 | Stop reason: SDUPTHER

## 2022-04-19 ENCOUNTER — TELEPHONE (OUTPATIENT)
Dept: NEUROLOGY | Facility: CLINIC | Age: 47
End: 2022-04-19

## 2022-04-27 ENCOUNTER — OFFICE VISIT (OUTPATIENT)
Dept: NEUROLOGY | Facility: CLINIC | Age: 47
End: 2022-04-27
Payer: COMMERCIAL

## 2022-04-27 VITALS
SYSTOLIC BLOOD PRESSURE: 121 MMHG | BODY MASS INDEX: 34.17 KG/M2 | RESPIRATION RATE: 18 BRPM | DIASTOLIC BLOOD PRESSURE: 63 MMHG | HEIGHT: 66 IN | TEMPERATURE: 97.3 F | WEIGHT: 212.6 LBS | HEART RATE: 91 BPM

## 2022-04-27 DIAGNOSIS — G43.109 MIGRAINE WITH AURA AND WITHOUT STATUS MIGRAINOSUS, NOT INTRACTABLE: Primary | ICD-10-CM

## 2022-04-27 DIAGNOSIS — G40.309 NONINTRACTABLE GENERALIZED IDIOPATHIC EPILEPSY WITHOUT STATUS EPILEPTICUS (HCC): ICD-10-CM

## 2022-04-27 DIAGNOSIS — F51.04 PSYCHOPHYSIOLOGICAL INSOMNIA: ICD-10-CM

## 2022-04-27 DIAGNOSIS — F41.9 ANXIETY: ICD-10-CM

## 2022-04-27 PROCEDURE — 99214 OFFICE O/P EST MOD 30 MIN: CPT | Performed by: PHYSICIAN ASSISTANT

## 2022-04-27 RX ORDER — CLONAZEPAM 0.5 MG/1
TABLET ORAL
Qty: 5 TABLET | Refills: 0 | Status: SHIPPED | OUTPATIENT
Start: 2022-04-27

## 2022-04-27 RX ORDER — ONDANSETRON 4 MG/1
4 TABLET, ORALLY DISINTEGRATING ORAL EVERY 6 HOURS PRN
Qty: 20 TABLET | Refills: 0 | Status: SHIPPED | OUTPATIENT
Start: 2022-04-27

## 2022-04-27 RX ORDER — TOPIRAMATE 100 MG/1
150 TABLET, FILM COATED ORAL 2 TIMES DAILY
Qty: 270 TABLET | Refills: 2 | Status: SHIPPED | OUTPATIENT
Start: 2022-04-27

## 2022-04-27 RX ORDER — SUMATRIPTAN AND NAPROXEN SODIUM 85; 500 MG/1; MG/1
TABLET, FILM COATED ORAL
Qty: 10 TABLET | Refills: 1 | Status: SHIPPED | OUTPATIENT
Start: 2022-04-27

## 2022-04-27 NOTE — PROGRESS NOTES
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  Her epilepsy is complicated by medication side effects with topiramate causing paresthesia and cognitive impairments  At timing of last visit, it was suggested she reduce the dose of topiramate to try and alleviate side effects  She did not reduce this as much as was instructed (was to change from 200mg BID to 150mg BID, but is taking 150mg AM and 200mg PM)  She continues to report word finding difficulty, brain fog  Migraines are stable  I have suggested further reducing topiramate to 150mg BID  If headaches worsen, would then suggest migraine specific medication such as TCA or propranolol  Plan:   Epilepsy:  1 - decrease topiramate to 150mg (one and a half tab 100mg tab) twice a day  2 - call the office if any recurrent seizure     Migraines:  1 - may continue to use Treximet at onset of migraine  No more than 2 doses in 24hrs  2 - advised staying hydrated with 64oz of water daily, not skipping meals, and getting adequate sleep  3 - keep a calendar of your migraine headache frequency and treatment  This may inform us if migraines worsen with reduction of topiramate  Anxiety:  1 - can continue to take sertraline 50mg take 2 5 tabs (125mg) daily for anxiety and sleep (this was previously being prescribed by Dr Argenis Márquez who she was seeing before transfer to the epilepsy center)  2 - may take one clonazepam 0 5mg tab as needed for anxiety attack  Labs were also ordered  She will return in 4 months or sooner if needed           Diagnoses and all orders for this visit:    Migraine with aura and without status migrainosus, not intractable  -     SUMAtriptan-naproxen (TREXIMET)  MG per tablet; Take 1 po at onset of migraine, may repeat x 1 in 24hrs  -     ondansetron (Zofran ODT) 4 mg disintegrating tablet; Take 1 tablet (4 mg total) by mouth every 6 (six) hours as needed for nausea or vomiting    Psychophysiological insomnia  -     sertraline (ZOLOFT) 50 mg tablet; Take 2 5 tablets (125 mg total) by mouth daily    Anxiety  -     clonazePAM (KlonoPIN) 0 5 mg tablet; Take one tab by mouth for severe panic attack  Nonintractable generalized 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 ([facility] )  -     CBC and differential; Future  -     Vitamin B12; Future  -     TSH, 3rd generation with Free T4 reflex; Future  -     Comprehensive metabolic panel; Future  -     topiramate (TOPAMAX) 100 mg tablet; Take 1 5 tablets (150 mg total) by mouth 2 (two) times a day           Subjective:    KAN Ornelas is a 40 y o  right handed female here for follow-up evaluation of epilepsy, migraines, and paresthesia  Interval history 4/27/2022:  Patient was last seen 10 months ago  At timing of last visit, decision was made to reduce topiramate in order to alleviate side effects (she c/o cognitive dysfunction, paresthesia)  Plan was to reduce from 200mg BID to 150mg BID  She tells me today she has been taking topiramate 150mg AM and 200mg PM, says she did not know the plan was to reduce to 150mg BID  She is still having brain fog, cognitive dysfunction  She has trouble with word finding  No issues with ADLs, driving etc   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, 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  She denies any seizures since her last visit  Migraines have been Stable  she uses Treximet at onset of migraine  She is requesting Zofran, has previously had Rx for this, but not recently    She needs refills of all of her medications  She was previously seen by Dr Braden Tenorio who had been refilling her Zoloft and she needs a refill of that  She also needs refill of clonazepam, which she has been prescribed for anxiety to use PRN  Intake History 6/11/2021  She was diagnosed with absence seizures when she was 10years old, she was put on Depakote, there were no further seizures  She noticed that she was losing her hair on Depakote  She was off medication since she was 16years old  She was off AED for many years until she was 30years old  When she was 30years old, six months after she had her son, she sent a an unusual text message to her  with random numbers  He called her back to find out what happened  She does not remember what happened  She noticed that she had showered but she does not recall showering  She recalled biting her tongue  It was suspected that she had a grand mal seizure  She saw Dr Carlos Moreno at [facility] around 2003/2004  Probably due to her migraine headaches, she was put on topiramate to manage both conditions  She was planning a pregnant at 28years old, transitioned from topiramate to lamotrigine  She unfortunately had a seizure  She lost this pregnancy due to genetic disorder  Then 4 months later she was pregnant with twins; she had stayed on lamotrigine and was constantly checking her lamotrigine levels  After she delivered her twins, she went right back on topiramate  When she is on topiramate, she struggles with word finding difficulty (I feel stupid)  She does not recall if lamotrigine was causing significant side effects  Every since 2006, she has been on topiramate  She has always been on topiramate 200mg twice a day; she does not recall ever trying a lower dose of medication    One night she woke up in a panic, like the world was going to end, she had difficulty thinking and putting her thoughts together, she was not able to calm down her nerves or thoughts, mind was racing like crazy, panic feeling lasted 5 minutes, she was worried that a seizure was going to happen  She was fairly coherent, but did not progress to altered awareness  She has had anxiety attacks in the past, she gets aura of panic, which then can result in a migraine headaches  She started to have migraines around middle school  She gets migraines that can last a week, just before her menses or weather changes  Headache is so severe she has to go into a hole, severely nauseated  Head pressure sensation over the front of head or back of the neck  She will try Advil 2 tabs to take the edge off and Coke or coffee  When she was pregnant she did not have migraines  Topiramate may help with migraines  She does not get a lot of migraines (maybe 5-6 in the last 6 months)  If her migraines are severe she has "major" medication, compound medication that cost $400  (Looking at the list of medication dichloralphenazone is no longer available in the [place], which contains chloral hydrate )  She had tried Maxalt but it did not work  It may have been sumatriptan  She may have used dexamethasone to abort headaches  She is currently on sumatriptan  Prior records from [facility]:  Last visit 1/23/2020 - she was prescribed Midrin (isometheptene-acetaminophen-dichloralphenazone) and Treximet for migraines  (she was having 5-10 days/month with headache, required steroids, under a lot of stress )  She has restless legs, previously prescribed Quinine but she just drinks tonic water now  Last seizure was in 2006; possible aura of panic, whole body warmth sensation  Visit on 12/5/2018 - she was having 1-2 migraines/month     AED/side effects/compliance:  topiramate 150-200  Paresthesia, cognitive impairment     Event/Seizure semiology:  1  Absence seizures  2   Possible generalized tonic clonic seizure     Woman of childbearing age with Epilepsy:  Contraception: Seasonale  Folic acid supplement: No     Prior Epilepsy History:  x     Special Features  Status epilepticus: No  Self Injury Seizures: No  Precipitating Factors: None  Post-ictal state: confusion     Epilepsy Risk Factors:  Abnormal pregnancy: No  Abnormal birth/: No  Abnormal Development: No  Febrile seizures, simple: No  Febrile seizures, complex: No  CNS infection: No  Mental retardation: No  Cerebral palsy: No  Head injury (moderate/severe): No  CNS neoplasm: No  CNS malformation: No  Neurosurgical procedure: No  Stroke: No  CNS autoimmune disorder: No  Alcohol abuse: No  Drug abuse: No  Family history Sz/epilepsy: her son (absence and grand mal seizures) and and one of her twin daughters (grand mal seizure at 3years old); brother with febrile seizure; one brother with seizure     Prior AEDs:  medication Max dose Time used Reason to stop   divalpreox     Hair loss   lamotrigine         topiramate         Tegretol (as a child)            PRIOR Migraine trials  Abortives - Frova (side effects), Maxalt and Zomig (no help), advil, Zofran, tylenol, flexerile     Prior workup:  x  [id number] - MRI brain  4-5 tiny foci of T2/FLAIR hyperintensity scattered in the subcortical white matter of both hemispheres  8/10/2015 - MRI brain [facility])  Right cerebellar tonsil protrudes through the foramen magnum unchanged from MRI 2010     EEGs:  None available       The following portions of the patient's history were reviewed and updated as appropriate: current medications, past family history, past medical history, past social history, past surgical history and problem list          Objective:    Blood pressure 121/63, pulse 91, temperature (!) 97 3 °F (36 3 °C), temperature source Tympanic, resp  rate 18, height 5' 6" (1 676 m), weight 96 4 kg (212 lb 9 6 oz)  Physical Exam  Constitutional:       Appearance: Normal appearance  HENT:      Head: Normocephalic and atraumatic     Eyes:      Extraocular Movements: EOM normal       Pupils: Pupils are equal, round, and reactive to light  Neurological:      Mental Status: She is alert  Coordination: Coordination is intact  Deep Tendon Reflexes: Strength normal and reflexes are normal and symmetric  Psychiatric:         Mood and Affect: Mood normal          Speech: Speech normal          Behavior: Behavior normal          Neurological Exam  Mental Status  Alert  Oriented to person, place, time and situation  Speech is normal  Language is fluent with no aphasia  Attention and concentration are normal     Cranial Nerves  CN II: Visual fields full to confrontation  CN III, IV, VI: Extraocular movements intact bilaterally  Pupils equal round and reactive to light bilaterally  CN V: Facial sensation is normal   CN VII: Full and symmetric facial movement  CN VIII: Hearing is normal   CN IX, X: Palate elevates symmetrically  CN XI: Shoulder shrug strength is normal   CN XII: Tongue midline without atrophy or fasciculations  Motor   Normal muscle tone  Strength is 5/5 throughout all four extremities  Sensory  Light touch is normal in upper and lower extremities  Reflexes  Deep tendon reflexes are 2+ and symmetric in all four extremities with downgoing toes bilaterally  Coordination  Finger-to-nose, rapid alternating movements and heel-to-shin normal bilaterally without dysmetria  Gait  Casual gait is normal including stance, stride, and arm swing  ROS:    Review of Systems   Constitutional: Negative for chills and fever  HENT: Negative for ear pain and sore throat  Eyes: Negative for pain and visual disturbance  Respiratory: Negative for cough and shortness of breath  Cardiovascular: Negative for chest pain and palpitations  Gastrointestinal: Negative for abdominal pain and vomiting  Genitourinary: Negative for dysuria and hematuria  Musculoskeletal: Positive for back pain  Negative for arthralgias  Skin: Negative for color change and rash     Neurological: Negative for seizures and syncope  All other systems reviewed and are negative      I personally reviewed and updated the ROS as appropriate

## 2022-04-27 NOTE — PATIENT INSTRUCTIONS
Decrease Topamax to 150mg twice a day (take 1 5 tablets of the 100mgs twice a day)  Continue other meds the same   Will obtain additional labs  Follow up in 4 months or sooner if needed

## 2022-06-23 ENCOUNTER — TELEPHONE (OUTPATIENT)
Dept: NEUROLOGY | Facility: CLINIC | Age: 47
End: 2022-06-23

## 2022-06-23 NOTE — TELEPHONE ENCOUNTER
----- Message from Vish Roper PA-C sent at 6/23/2022  8:17 AM EDT -----  Please let patient know her B12 level is on the low side  I would suggest starting OTC vit B12 1000mcg daily

## 2022-06-29 NOTE — TELEPHONE ENCOUNTER
Pt made aware, she verbalizes understanding  She is asking if the Vitamin B12 level could be causing her memory issues? Notes she is having issues with word finding  Please advise  [phone]  Okay to leave detailed message

## 2022-06-29 NOTE — TELEPHONE ENCOUNTER
That is why I checked  Sometimes low B12 can cause cognitive issues    I cannot say with 675% certainty that the lower B12 level alone is what is causing it, but we can see if supplementing B12 helps

## 2022-08-17 ENCOUNTER — OFFICE VISIT (OUTPATIENT)
Dept: NEUROLOGY | Facility: CLINIC | Age: 47
End: 2022-08-17
Payer: COMMERCIAL

## 2022-08-17 VITALS
DIASTOLIC BLOOD PRESSURE: 66 MMHG | SYSTOLIC BLOOD PRESSURE: 118 MMHG | BODY MASS INDEX: 34.39 KG/M2 | TEMPERATURE: 97.1 F | RESPIRATION RATE: 18 BRPM | HEART RATE: 97 BPM | WEIGHT: 214 LBS | HEIGHT: 66 IN

## 2022-08-17 DIAGNOSIS — G43.109 MIGRAINE WITH AURA AND WITHOUT STATUS MIGRAINOSUS, NOT INTRACTABLE: ICD-10-CM

## 2022-08-17 DIAGNOSIS — R63.5 WEIGHT GAIN: ICD-10-CM

## 2022-08-17 DIAGNOSIS — G40.309 NONINTRACTABLE GENERALIZED IDIOPATHIC EPILEPSY WITHOUT STATUS EPILEPTICUS (HCC): Primary | ICD-10-CM

## 2022-08-17 PROCEDURE — 99214 OFFICE O/P EST MOD 30 MIN: CPT | Performed by: PHYSICIAN ASSISTANT

## 2022-08-17 RX ORDER — NORETHINDRONE ACETATE AND ETHINYL ESTRADIOL AND FERROUS FUMARATE 1MG-20(24)
1 KIT ORAL DAILY
COMMUNITY

## 2022-08-17 NOTE — PROGRESS NOTES
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  Her epilepsy is complicated by medication side effects with topiramate causing paresthesia and cognitive impairments  We have been reducing her topiramate to see if that helps with cognitive complaints  She was also found to have B12 deficiency and was started on a B12 supplement  With the reduction of topiramate and addition of B12, she feels she is doing better from a cognitive standpoint  Her migraines were doing well, however she recently change oral contraceptive pills to a lower dose, and she feels this has caused some increased headaches  She just started the new pill, so she would like to give it time to see if her body evens out hormone-wise and see if her headaches improve  If not, we can consider migraine specific medication such as propranolol  Would consider TCA, but she is on a good dose of sertraline already  Plan:   Epilepsy:  1 - continue topiramate 150mg (one and a half tab 100mg tab) twice a day  2 - call the office if any recurrent seizure      Migraines:  1 - may continue to use Treximet at onset of migraine  No more than 2 doses in 24hrs  2 - advised staying hydrated with 64oz of water daily, not skipping meals, and getting adequate sleep  3 - keep a calendar of your migraine headache frequency and treatment  This may inform us if migraines worsen with reduction of topiramate       Anxiety:  1 - can continue to take sertraline 50mg take 2 5 tabs (125mg) daily for anxiety and sleep (this was previously being prescribed by Dr Marilu Garcia who she was seeing before transfer to the epilepsy center)  2 - may take one clonazepam 0 5mg tab as needed for anxiety attack  B12 deficiency:  1 - continue OTC B12 1000mcg daily     Patient also reported difficulty losing weight, having questions about her diet and what would be best for her to eat in order to lose weight  I suggested referred to medical weight loss center and she is agreeable  Referral placed  She will return in 4 months or sooner if needed  Diagnoses and all orders for this visit:    Nonintractable generalized 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 ([facility] [address] )    Migraine with aura and without status migrainosus, not intractable    Weight gain  -     Ambulatory Referral to Weight Management; Future    Other orders  -     norethindrone-ethinyl estradiol-ferrous fumarate (LOESTIN 24 FE) 1-20 MG-MCG(24) per tablet; Take 1 tablet by mouth daily           Subjective:    KAN Gant is a 56 y o  right handed female here for follow-up evaluation of epilepsy, migraines, and paresthesia  Interval history 8/17/2022:  Patient reports she is overall doing well since last visit  Her topiramate was reduced to 150mg BID  She also had labs done which showed low B12 and she started taking a B12 supplement  She feels she is doing better from a cognitive standpoint with these changes  She no longer has significant issues searching for words  Her headaches were doing well but increased in the last few weeks  She recently changed birth control pills to a lower dose pill and feels that may be causing increased headaches  She just completed her first month of the new medication  She reports she is struggling to lose weight  She is not sure she is eating the right things or not  She has been doing some research on gut health and hormones  AED/side effects/compliance:  topiramate 150-150  Paresthesia, cognitive impairment (worse at higher doses)     Event/Seizure semiology:  1  Absence seizures  2   Possible generalized tonic clonic seizure     Intake History 6/11/2021  She was diagnosed with absence seizures when she was 10years old, she was put on Depakote, there were no further seizures  She noticed that she was losing her hair on Depakote  She was off medication since she was 16years old  She was off AED for many years until she was 30years old  When she was 30years old, six months after she had her son, she sent a an unusual text message to her  with random numbers  He called her back to find out what happened  She does not remember what happened  She noticed that she had showered but she does not recall showering  She recalled biting her tongue  It was suspected that she had a grand mal seizure  She saw Dr Kenyatta David at [facility] staring around 2003/2004  Probably due to her migraine headaches, she was put on topiramate to manage both conditions  She was planning a pregnant at 28years old, transitioned from topiramate to lamotrigine  She unfortunately had a seizure  She lost this pregnancy due to genetic disorder  Then 4 months later she was pregnant with twins; she had stayed on lamotrigine and was constantly checking her lamotrigine levels  After she delivered her twins, she went right back on topiramate  When she is on topiramate, she struggles with word finding difficulty (I feel stupid)  She does not recall if lamotrigine was causing significant side effects  Every since 2006, she has been on topiramate  She has always been on topiramate 200mg twice a day; she does not recall ever trying a lower dose of medication  One night she woke up in a panic, like the world was going to end, she had difficulty thinking and putting her thoughts together, she was not able to calm down her nerves or thoughts, mind was racing like crazy, panic feeling lasted 5 minutes, she was worried that a seizure was going to happen  She was fairly coherent, but did not progress to altered awareness    She has had anxiety attacks in the past, she gets aura of panic, which then can result in a migraine headaches  She started to have migraines around middle school  She gets migraines that can last a week, just before her menses or weather changes  Headache is so severe she has to go into a hole, severely nauseated  Head pressure sensation over the front of head or back of the neck  She will try Advil 2 tabs to take the edge off and Coke or coffee  When she was pregnant she did not have migraines  Topiramate may help with migraines  She does not get a lot of migraines (maybe 5-6 in the last 6 months)  If her migraines are severe she has "major" medication, compound medication that cost $400  (Looking at the list of medication dichloralphenazone is no longer available in the [place], which contains chloral hydrate )  She had tried Maxalt but it did not work  It may have been sumatriptan  She may have used dexamethasone to abort headaches  She is currently on sumatriptan  Prior records from [facility]:  Last visit 1/23/2020 - she was prescribed Midrin (isometheptene-acetaminophen-dichloralphenazone) and Treximet for migraines  (she was having 5-10 days/month with headache, required steroids, under a lot of stress )  She has restless legs, previously prescribed Quinine but she just drinks tonic water now  Last seizure was in 2006; possible aura of panic, whole body warmth sensation  Visit on 12/5/2018 - she was having 1-2 migraines/month    Interval history 4/27/2022:  Patient was last seen 10 months ago  At timing of last visit, decision was made to reduce topiramate in order to alleviate side effects (she c/o cognitive dysfunction, paresthesia)  Plan was to reduce from 200mg BID to 150mg BID  She tells me today she has been taking topiramate 150mg AM and 200mg PM, says she did not know the plan was to reduce to 150mg BID  She is still having brain fog, cognitive dysfunction  She has trouble with word finding   No issues with ADLs, driving etc  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, 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  She denies any seizures since her last visit  Migraines have been Stable  she uses Treximet at onset of migraine  She is requesting Zofran, has previously had Rx for this, but not recently  She needs refills of all of her medications  She was previously seen by Dr Sunni Marc who had been refilling her Zoloft and she needs a refill of that  She also needs refill of clonazepam, which she has been prescribed for anxiety to use PRN  Woman of childbearing age with Epilepsy:  Contraception: Seasonale  Folic acid supplement: No     Prior Epilepsy History:  x     Special Features  Status epilepticus: No  Self Injury Seizures: No  Precipitating Factors: None  Post-ictal state: confusion     Epilepsy Risk Factors:  Abnormal pregnancy: No  Abnormal birth/: No  Abnormal Development: No  Febrile seizures, simple: No  Febrile seizures, complex: No  CNS infection: No  Mental retardation: No  Cerebral palsy: No  Head injury (moderate/severe): No  CNS neoplasm: No  CNS malformation: No  Neurosurgical procedure: No  Stroke: No  CNS autoimmune disorder: No  Alcohol abuse: No  Drug abuse: No  Family history Sz/epilepsy: her son (absence and grand mal seizures) and and one of her twin daughters (grand mal seizure at 3years old); brother with febrile seizure; one brother with seizure     Prior AEDs:  medication Max dose Time used Reason to stop   divalpreox     Hair loss   lamotrigine         topiramate         Tegretol (as a child)            PRIOR Migraine trials  Abortives - Frova (side effects), Maxalt and Zomig (no help), advil, Zofran, tylenol, flexerile     Prior workup:  x  [id number] - MRI brain  4-5 tiny foci of T2/FLAIR hyperintensity scattered in the subcortical white matter of both hemispheres       8/10/2015 - MRI brain [facility])  Right cerebellar tonsil protrudes through the foramen magnum unchanged from MRI 4/13/2010     EEGs:  None available     Labs:  Ref Range & Units 6/21/22  9:40 AM    Vitamin B12 >211 pg/mL 264       Ref Range & Units 6/21/22  9:40 AM   Thyroid Stimulating Hormone 0 36 - 3 74 uIU/mL 1 31       Ref Range & Units 6/21/22  9:40 AM   Glucose 65 - 99 mg/dL 83    BUN 7 - 25 mg/dL 14    Creatinine 0 4 - 1 1 mg/dL 0 66    Sodium 135 - 145 mmol/L 139    Potassium 3 5 - 5 2 mmol/L 4 1    Chloride 100 - 109 mmol/L 112 High     Carbon Dioxide 23 - 31 mmol/L 20 Low     Calcium 8 5 - 10 1 mg/dL 8 9    Alkaline Phosphatase 35 - 120 U/L 151 High     Albumin 3 5 - 4 8 g/dL 3 1 Low     Bilirubin, Total 0 2 - 1 mg/dL 0 3    Comment: Use of this assay is not recommended for patients undergoing treatment with eltrombopag due to the potential for falsely elevated results     Protein, Total 6 3 - 8 3 g/dL 7 3    AST <41 U/L 16    ALT <56 U/L 20    Anion Gap 3 - 11 7    eGFRcr >59 110    eGFRcr Comment  Interpretive information: calculated GFR       Ref Range & Units 6/21/22  9:40 AM   Hemoglobin 11 5 - 14 5 g/dL 13 4    Hematocrit 35 - 43 % 40 4    WBC 4 - 10 thou/cmm 7 7    RBC 3 7 - 4 7 mill/cmm 4 38    Platelet Count 650 - 350 thou/cmm 330    MPV 7 5 - 11 3 fL 7 9    MCV 80 - 100 fL 92    MCH 26 - 34 pg 30 5    MCHC 32 - 37 g/dL 33 1    RDW 12 - 16 % 13 3    Differential Type  AUTO    Absolute Neutrophils 1 8 - 7 8 thou/cmm 4 3    Absolute Lymphocytes 1 - 3 thou/cmm 2 9    Absolute Monocytes 0 3 - 1 thou/cmm 0 4    Absolute Eosinophils 0 - 0 5 thou/cmm 0 0    Absolute Basophils 0 - 0 1 thou/cmm 0 1    Neutrophils % 55    Lymphocytes % 38    Monocytes % 5    Eosinophils % 1    Basophils % 1          The following portions of the patient's history were reviewed and updated as appropriate: current medications, past family history, past medical history, past social history, past surgical history and problem list  Objective:    Blood pressure 118/66, pulse 97, temperature (!) 97 1 °F (36 2 °C), temperature source Tympanic, resp  rate 18, height 5' 6" (1 676 m), weight 97 1 kg (214 lb)  Physical Exam  Constitutional:       Appearance: Normal appearance  HENT:      Head: Normocephalic and atraumatic  Eyes:      Extraocular Movements: EOM normal       Pupils: Pupils are equal, round, and reactive to light  Neurological:      Mental Status: She is alert  Deep Tendon Reflexes: Strength normal and reflexes are normal and symmetric  Psychiatric:         Mood and Affect: Mood normal          Speech: Speech normal          Behavior: Behavior normal          Neurological Exam  Mental Status  Alert  Oriented to person, place, time and situation  Speech is normal  Language is fluent with no aphasia  Attention and concentration are normal     Cranial Nerves  CN II: Visual fields full to confrontation  CN III, IV, VI: Extraocular movements intact bilaterally  Pupils equal round and reactive to light bilaterally  CN V: Facial sensation is normal   CN VII: Full and symmetric facial movement  CN VIII: Hearing is normal   CN IX, X: Palate elevates symmetrically  CN XI: Shoulder shrug strength is normal   CN XII: Tongue midline without atrophy or fasciculations  Motor   Normal muscle tone  Strength is 5/5 throughout all four extremities  Sensory  Light touch is normal in upper and lower extremities  Reflexes  Deep tendon reflexes are 2+ and symmetric in all four extremities  Coordination  Right: Finger-to-nose normal Left: Finger-to-nose normal     Gait  Casual gait is normal including stance, stride, and arm swing        Current Outpatient Medications   Medication Sig Dispense Refill    folic acid (FOLVITE) 1 mg tablet Take 1 tablet (1 mg total) by mouth daily 90 tablet 3    norethindrone-ethinyl estradiol-ferrous fumarate (LOESTIN 24 FE) 1-20 MG-MCG(24) per tablet Take 1 tablet by mouth daily      ondansetron (Zofran ODT) 4 mg disintegrating tablet Take 1 tablet (4 mg total) by mouth every 6 (six) hours as needed for nausea or vomiting 20 tablet 0    sertraline (ZOLOFT) 50 mg tablet Take 2 5 tablets (125 mg total) by mouth daily 225 tablet 3    SUMAtriptan-naproxen (TREXIMET)  MG per tablet Take 1 po at onset of migraine, may repeat x 1 in 24hrs 10 tablet 1    topiramate (TOPAMAX) 100 mg tablet Take 1 5 tablets (150 mg total) by mouth 2 (two) times a day 270 tablet 2    clonazePAM (KlonoPIN) 0 5 mg tablet Take one tab by mouth for severe panic attack  (Patient not taking: Reported on 2022) 5 tablet 0    pantoprazole (PROTONIX) 40 mg tablet Take 1 tablet (40 mg total) by mouth daily (Patient not taking: Reported on 2022) 90 tablet 2     No current facility-administered medications for this visit  Allergies   Allergen Reactions    Tetracycline Rash     Other reaction(s): Other (See Comments)  throat closes      Past Medical History:   Diagnosis Date    Interstitial cystitis     Migraines     Seizures ([facility] [address] )       Past Surgical History:   Procedure Laterality Date     SECTION      UPPER GASTROINTESTINAL ENDOSCOPY        Family History   Problem Relation Age of Onset    Lung cancer Mother     Multiple sclerosis Sister     Seizures Brother     Seizures Paternal Grandmother     Seizures Son     Seizures Daughter       Past Psychiatric History:  Depression: yes  Anxiety: yes  Psychosis: No    Social History  Living situation:  Lives with family  Work:    Driving:  Yes   reports that she has never smoked  She has never used smokeless tobacco  She reports current alcohol use  She reports that she does not use drugs  ROS:    Review of Systems   Constitutional: Negative for chills and fever  HENT: Negative for ear pain and sore throat  Eyes: Negative for pain and visual disturbance  Respiratory: Negative for cough and shortness of breath  Cardiovascular: Negative for chest pain and palpitations  Gastrointestinal: Negative for abdominal pain and vomiting  Genitourinary: Negative for dysuria and hematuria  Musculoskeletal: Negative for arthralgias and back pain  Skin: Negative for color change and rash  Neurological: Positive for headaches (pt states increased since last seen us )  Negative for seizures and syncope  Tingling in feet   Psychiatric/Behavioral: Positive for sleep disturbance (at times)  All other systems reviewed and are negative      I personally reviewed and updated the ROS as appropriate

## 2022-08-17 NOTE — PATIENT INSTRUCTIONS
Plan:   Epilepsy:  1 - continue topiramate 150mg (one and a half tab 100mg tab) twice a day  2 - call the office if any recurrent seizure      Migraines:  1 - may continue to use Treximet at onset of migraine  No more than 2 doses in 24hrs  2 - advised staying hydrated with 64oz of water daily, not skipping meals, and getting adequate sleep  3 - keep a calendar of your migraine headache frequency and treatment  This may inform us if migraines worsen with reduction of topiramate  Anxiety:  1 - can continue to take sertraline 50mg take 2 5 tabs (125mg) daily for anxiety and sleep   2 - may take one clonazepam 0 5mg tab as needed for anxiety attack      B12 deficiency:  1 - continue OTC B12 1000mcg daily      -referral to medical weight loss center    Follow up in 4 months or sooner if needed

## 2022-08-19 ENCOUNTER — CONSULT (OUTPATIENT)
Dept: BARIATRICS | Facility: CLINIC | Age: 47
End: 2022-08-19
Payer: COMMERCIAL

## 2022-08-19 VITALS
DIASTOLIC BLOOD PRESSURE: 80 MMHG | SYSTOLIC BLOOD PRESSURE: 118 MMHG | BODY MASS INDEX: 34.23 KG/M2 | RESPIRATION RATE: 16 BRPM | HEIGHT: 66 IN | WEIGHT: 213 LBS | HEART RATE: 98 BPM

## 2022-08-19 DIAGNOSIS — F32.A DEPRESSION: ICD-10-CM

## 2022-08-19 DIAGNOSIS — G43.109 MIGRAINE WITH AURA AND WITHOUT STATUS MIGRAINOSUS, NOT INTRACTABLE: ICD-10-CM

## 2022-08-19 DIAGNOSIS — R63.5 WEIGHT GAIN: ICD-10-CM

## 2022-08-19 DIAGNOSIS — E66.9 OBESITY, CLASS I, BMI 30-34.9: Primary | ICD-10-CM

## 2022-08-19 DIAGNOSIS — G40.309 NONINTRACTABLE GENERALIZED IDIOPATHIC EPILEPSY WITHOUT STATUS EPILEPTICUS (HCC): ICD-10-CM

## 2022-08-19 PROBLEM — E66.811 OBESITY, CLASS I, BMI 30-34.9: Status: ACTIVE | Noted: 2022-08-19

## 2022-08-19 PROCEDURE — 99244 OFF/OP CNSLTJ NEW/EST MOD 40: CPT | Performed by: NURSE PRACTITIONER

## 2022-08-19 NOTE — PROGRESS NOTES
Assessment/Plan:    Obesity, Class I, BMI 30-34 9  - Discussed options of HealthyCORE-Intensive Lifestyle Intervention Program, Very Low Calorie Diet-VLCD and Conservative Program and the role of weight loss medications  - Explained the importance of making lifestyle changes first before starting any anti-obesity medications  - Patient should demonstrate lifestyle changes first before anti-obesity medication can be initiated  - Already on Topamax for history of epilepsy   - Not a candidate for Wellbutrin or Contrave due to seizure history  - Avoid phentermine given history of seizures  - Patient is interested in pursuing [facility]-Intensive Lifestyle Intervention Program  - Initial weight loss goal of 5-10% weight loss for improved health  - Weight loss can improve patient's co-morbid conditions and/or prevent weight-related complications  - Stop Bang 2/8  - Labs reviewed: CMP and TSH 6/21/2022  Alk phos slightly elevated (chronic), otherwise labs within acceptable range  - Check lipid panel, A1C, and fasting insulin  Depression  - Taking zoloft  Continue management with prescribing provider  Nonintractable generalized 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 ([facility] [address] )  - Taking Topamax  Continue management with prescribing provider  Migraine with aura and without status migrainosus, not intractable  - Taking Topamax and Treximet as needed  Continue management with prescribing provider  Jewell Restrepo was seen today for consult  Diagnoses and all orders for this visit:    Obesity, Class I, BMI 30-34 9  -     Lipid Panel with Direct LDL reflex; Future  -     HEMOGLOBIN A1C W/ EAG ESTIMATION; Future  -     Insulin, fasting; Future    Weight gain  -     Ambulatory Referral to Weight Management  -     Lipid Panel with Direct LDL reflex; Future  -     HEMOGLOBIN A1C W/ EAG ESTIMATION; Future  -     Insulin, fasting;  Future    Nonintractable generalized 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 ([facility] [address] )  -     Lipid Panel with Direct LDL reflex; Future  -     HEMOGLOBIN A1C W/ EAG ESTIMATION; Future  -     Insulin, fasting; Future    Depression    Migraine with aura and without status migrainosus, not intractable            Follow up in approximately custodial through [facility] with Non-Surgical Physician/Advanced Practitioner  Subjective:   Chief Complaint   Patient presents with    Consult     MWM consult; waist 41in; goal wt 150; stop bang 2-8       Patient ID: Yobany Mcrae  is a 56 y o  female with excess weight/obesity here to pursue weight management  Previous notes and records have been reviewed  Past Medical History:   Diagnosis Date    Interstitial cystitis     Migraines     Seizures ([facility] [address] )      Past Surgical History:   Procedure Laterality Date     SECTION      UPPER GASTROINTESTINAL ENDOSCOPY         HPI:  Wt Readings from Last 20 Encounters:   22 96 6 kg (213 lb)   22 97 1 kg (214 lb)   22 96 4 kg (212 lb 9 6 oz)   21 95 7 kg (211 lb)   21 90 7 kg (200 lb)   21 92 5 kg (204 lb)   01/15/21 95 6 kg (210 lb 12 8 oz)   20 95 3 kg (210 lb)   19 90 3 kg (199 lb)   10/03/19 90 3 kg (199 lb)   19 93 4 kg (206 lb)   19 91 6 kg (202 lb)     Obesity/Excess Weight:  Severity: Mild  Onset:  Entire life, particularly around middle school     Modifiers: Diet and Exercise and Commercial Weight Loss Programs-ie  Weight Watchers, Renelda Cancel, Nutrisystem, etc   Contributing factors: not sure what is contributing to weight gain  Associated symptoms: increased joint pain, decreased self esteem, increased shortness of breath and clothes do not fit     Under more stress related to son leaving for college and her mom has stage 4 lung cancer       Hydration:80 of water, 1 cup coffee with splenda and nonfat creamer, 3-4 mini can soda with migraines - about every 3 months, 1 cup unsweetened almond milk in smoothie daily    Alcohol: none  Smoking: none  Exercise: none  Occupation: teacher   Sleep: 4-8 hours  STOP ban/8    Highest weight: current   Current weight: 213 lbs  Goal weight: 150 lbs    Colonoscopy: UTD, due 2026  Mammogram: get through [facility], UTD, due 2022  The following portions of the patient's history were reviewed and updated as appropriate: allergies, current medications, past family history, past medical history, past social history, past surgical history, and problem list     Review of Systems   HENT: Negative for sore throat  Respiratory: Negative for cough and shortness of breath  Cardiovascular: Negative for chest pain and palpitations  Gastrointestinal: Negative for constipation, diarrhea, nausea and vomiting  Denies GERD   Endocrine: Positive for heat intolerance (intermittent)  Negative for cold intolerance  Genitourinary: Negative for dysuria  Musculoskeletal: Positive for arthralgias (chronic) and back pain (chronic )  Skin: Negative for rash  Neurological: Positive for headaches (migraines controlled)  Psychiatric/Behavioral: Negative for suicidal ideas (or HI)  Denies depression and anxiety       Objective:  /80   Pulse 98   Resp 16   Ht 5' 6" (1 676 m)   Wt 96 6 kg (213 lb)   Breastfeeding No   BMI 34 38 kg/m²     HR rechecked later during office visit and noted to be 98 BPM     Physical Exam  Vitals and nursing note reviewed  Constitutional   General appearance: Abnormal   well developed and obese  Eyes No conjunctival injection  Ears, Nose, Mouth, and Throat Oral mucosa moist    Pulmonary   Respiratory effort: No increased work of breathing or signs of respiratory distress  Cardiovascular     Examination of extremities for edema and/or varicosities: Normal   no edema  Abdomen   Abdomen: Abnormal   The abdomen was obese      Musculoskeletal   Gait and station: Normal     Psychiatric   Orientation to person, place and time: Normal     Affect: appropriate

## 2022-09-12 ENCOUNTER — OFFICE VISIT (OUTPATIENT)
Dept: BARIATRICS | Facility: CLINIC | Age: 47
End: 2022-09-12

## 2022-09-12 VITALS — BODY MASS INDEX: 34.67 KG/M2 | HEIGHT: 66 IN | WEIGHT: 215.7 LBS

## 2022-09-12 DIAGNOSIS — R63.5 ABNORMAL WEIGHT GAIN: ICD-10-CM

## 2022-09-12 PROCEDURE — WMPRO12: Performed by: DIETITIAN, REGISTERED

## 2022-09-12 PROCEDURE — RECHECK: Performed by: DIETITIAN, REGISTERED

## 2022-09-12 NOTE — PROGRESS NOTES
Weight Management Medical Nutrition Assessment  Natasha Valdivia presented for the New Start session with the [facility]   Today's weight is 212 4#   Per dietary recall patient consumes excess calories from larger unmeasured portions at dinner meal and choosing calorie dense foods on the weekend  She stated she has tried many weight loss interventions in the past and verbalized frustration about her current weight   She if currently not food logging and her physical activity level is low  Discussed barriers to both logging and increasing her physical activity  Patient reported she did not feel there was an emotional component to her dietary behaviors bur mentioned she feels the urge to eat in the car on way from school  We developed and reviewed a low calorie meal plan       Patient seen by Medical Provider in past 6 months:  yes  Requested to schedule appointment with Medical Provider: No      Anthropometric Measurements  Start Weight (#): 215 7#  Current Weight (#): n/a  TBW % Change from start weight:n/a  Ideal Body Weight (#):130#  Goal Weight (#):STG: under 200#  LT#  Usual Weight: 150# 15 years ago pre pregnancy    Weight Loss History  Previous weight loss attempts: Self Created Diets (Portion Control, Healthy Food Choices, etc )    Food and Nutrition Related History    Food Recall  Breakfast: coffee with Splenda/ FF Coffee mate - no measured   Madolyn Julita with peanut butter  Kind Breakfast Bar   Snack:skip   Lunch:[place] or [place] Pre Made Salad with chicken   [place]   Yogurt with pepper and hummas- unmeasured   2:00pm Snack:That's it Protein Bar   4:00pm Snack:Fruit / or [place] / Nutri grain / Kind Bar   [place]  ready made with pasta- unmeasured   Lean protein/ veggies/ carb - unmeasured larger serving    Snack:skip or sweets- cookie or berries       Beverages: water and sparkling water  Volume of beverage intake: 60-80oz    Weekends: Same  Cravings: sweets   Trouble area of day:between meals and portions     Frequency of Eating out: 2x week - chooses calorie dense foods  Food restrictions:none reported   Cooking: self   Food Shopping: self    Physical Activity Intake  Activity:Walking  Frequency:infrequently  Physical limitations/barriers to exercise: none reported     Estimated Needs  Energy  [place] Energy Needs: BMR : 1623 calories  1-2# loss weekly sedentary:  947-1447 calories             1-2# loss weekly lightly active:9075-4929 calories  Maintenance calories for sedentary activity level: 1947 calories  Protein:71-89gm     (1 2-1 5g/kg IBW)  Fluid: 69oz     (35mL/kg IBW)    Nutrition Diagnosis  Yes; Overweight/obesity  related to Excess energy intake as evidenced by  BMI more than normative standard for age and sex (obesity-grade I 26-30  9)       Nutrition Intervention    Nutrition Prescription  Calories:1400 calories   Protein:  Fluid:    Meal Plan (Ino/Pro/Carb)  Breakfast: 300/20/30  Snack:  Lunch: 300-/30/30-45  Snack: 150-200/>5/20  Dinner: 300-400/30/30-45  Snack: 150-200/>5/20    Nutrition Education:    [facility] Manual  Calorie controlled menu  Lean protein food choices  Healthy snack options  Food journaling tips      Nutrition Counseling:  Strategies: meal planning, portion sizes, healthy snack choices, hydration, fiber intake, protein intake, exercise, food journal      Monitoring and Evaluation:  Evaluation criteria:  Energy Intake  Meet protein needs  Maintain adequate hydration  Monitor weekly weight  Meal planning/preparation  Food journal   Decreased portions at mealtimes and snacks  Physical activity     Barriers to learning:none  Readiness to change: Preparation:  (Getting ready to change)   Comprehension: good  Expected Compliance: good

## 2022-09-22 ENCOUNTER — CLINICAL SUPPORT (OUTPATIENT)
Dept: BARIATRICS | Facility: CLINIC | Age: 47
End: 2022-09-22

## 2022-09-22 VITALS — WEIGHT: 216.2 LBS | HEIGHT: 66 IN | BODY MASS INDEX: 34.75 KG/M2

## 2022-09-22 DIAGNOSIS — R63.5 ABNORMAL WEIGHT GAIN: Primary | ICD-10-CM

## 2022-09-22 PROCEDURE — RECHECK

## 2022-09-29 ENCOUNTER — CLINICAL SUPPORT (OUTPATIENT)
Dept: BARIATRICS | Facility: CLINIC | Age: 47
End: 2022-09-29

## 2022-09-29 VITALS — WEIGHT: 214.6 LBS | HEIGHT: 66 IN | BODY MASS INDEX: 34.49 KG/M2

## 2022-09-29 DIAGNOSIS — R63.5 ABNORMAL WEIGHT GAIN: Primary | ICD-10-CM

## 2022-09-29 PROCEDURE — RECHECK

## 2022-10-03 NOTE — PROGRESS NOTES
Weight Management Medical Nutrition Assessment  Natasha Valdivia is here for f/u RD session for [facility]   Today's weight is  212 8#  She has lost 2 9 lbs x 3 wks  Seca completed, results reviewed  Tracking using Lose It and feels she is consuming 7706-0794 calories/day but has not been measuring/logging  Frustrated with rate of loss, reassured that rate of loss is appropriate  Labs ordered in August not drawn, recommend she get these done to see if she would benefit from any medications in the event she is insulin resistant  At this time she does not feel she will continue with classes  Will have additional RD visit and then transition to bundles  She will have labs drawn this weekend        Patient seen by Medical Provider in past 6 months:  yes  Requested to schedule appointment with Medical Provider: No    Anthropometric Measurements  Start Weight (#): 215 7# 22  Current Weight (#): 212 8 lbs   TBW % Change from start weight: 1 3%  Ideal Body Weight (#):130#  Goal Weight (#):STG: under 200#  LT#  Usual Weight: 150# 15 years ago pre pregnancy    Weight Loss History  Previous weight loss attempts: Self Created Diets (Portion Control, Healthy Food Choices, etc )    Food and Nutrition Related History    Food Recall  Breakfast:6:30 a m  coffee with Splenda/ 1 tbsp FF Coffee mate - core protein shake OR Taoism oatmeal 1/2 cup, 1% milk, pb or blueberries or banana  Snack:skip OR kind mini  OR carrots OR made good oatmeal ball  Lunch: 11:30-12:30: [place] or [place] Pre Made Salad with chicken, apple, pb  OR tuna, salad, fruit   Snack: kind mini  Dinner:5:30:  Beans/veggies/rice OR ~4 oz protein, 1/2 cup carb, veggies   Snack: popcorn     Beverages: water and sparkling water  Volume of beverage intake: 60-80oz    Weekends: Same  Cravings: sweets   Trouble area of day: on the way home from school    Frequency of Eating out: 2x week - chooses calorie dense foods  Food restrictions:none reported   Cooking: self   Food Shopping: self    Physical Activity Intake  Activity:Walking  Frequency:infrequently  Physical limitations/barriers to exercise: none reported     Estimated Needs  Energy  [place] Energy Needs: BMR : 1623 calories  1-2# loss weekly sedentary:  947-1447 calories             1-2# loss weekly lightly active:4682-6569 calories  Maintenance calories for sedentary activity level: 1947 calories  Protein:71-89gm     (1 2-1 5g/kg IBW)  Fluid: 69oz     (35mL/kg IBW)    Nutrition Diagnosis  Yes; Overweight/obesity  related to Excess energy intake as evidenced by  BMI more than normative standard for age and sex (obesity-grade I 26-30  9)       Nutrition Intervention    Nutrition Prescription  Calories:1400 calories   Protein:  Fluid:    Meal Plan (Ino/Pro/Carb)  Breakfast: 300/20/30  Snack:  Lunch: 300-/30/30-45  Snack: 150-200/>5/20  Dinner: 300-400/30/30-45  Snack: 150-200/>5/20    Nutrition Education:    [facility] Manual  Calorie controlled menu  Lean protein food choices  Healthy snack options  Food journaling tips      Nutrition Counseling:  Strategies: meal planning, portion sizes, healthy snack choices, hydration, fiber intake, protein intake, exercise, food journal      Monitoring and Evaluation:  Evaluation criteria:  Energy Intake  Meet protein needs  Maintain adequate hydration  Monitor weekly weight  Meal planning/preparation  Food journal   Decreased portions at mealtimes and snacks  Physical activity     Barriers to learning:none  Readiness to change: Action:  (Changing behavior)  Comprehension: good  Expected Compliance: good

## 2022-10-05 ENCOUNTER — OFFICE VISIT (OUTPATIENT)
Dept: BARIATRICS | Facility: CLINIC | Age: 47
End: 2022-10-05

## 2022-10-05 VITALS — HEIGHT: 66 IN | WEIGHT: 212.75 LBS | BODY MASS INDEX: 34.19 KG/M2

## 2022-10-05 DIAGNOSIS — R63.5 ABNORMAL WEIGHT GAIN: Primary | ICD-10-CM

## 2022-10-05 PROCEDURE — RECHECK

## 2022-11-11 ENCOUNTER — TELEPHONE (OUTPATIENT)
Dept: BARIATRICS | Facility: CLINIC | Age: 47
End: 2022-11-11

## 2022-11-14 NOTE — PROGRESS NOTES
Weight Management Medical Nutrition Assessment  Noni Durham is here for f/u RD session for [facility] in lieu of classes  Today's weight is  206#  She has lost 6 8 lbs x 5 wks with overall loss of 9 7 lbs x 2 months  Her mother passed away last week and so her routine has been difficult to keep consistent  She is having trouble eating due to nausea and feels her hydration has declined  She realizes she has had a headache for the last 3 days and realizes it may be due to hydration  Prior to this she feels things were going well  Consuming ~1400 calories/day with the Lose It katalina  Did not have labs drawn yet, recommend have these done prior to next visit  She is contemplating joining a gym as a family  Options for f/u reviewed  She will f/u with ALFIE in 2 wks        Patient seen by Medical Provider in past 6 months:  yes  Requested to schedule appointment with Medical Provider: Yes    Anthropometric Measurements  Start Weight (#): 215 7# 22  Current Weight (#): 206 lbs   TBW % Change from start weight: 4 5%  Ideal Body Weight (#):130#  Goal Weight (#):STG: under 200#  LT#  Usual Weight: 150# 15 years ago pre pregnancy    Weight Loss History  Previous weight loss attempts: Self Created Diets (Portion Control, Healthy Food Choices, etc )    Food and Nutrition Related History    Food Recall  Breakfast:6:30 a m  coffee with Splenda/ 1 tbsp FF Coffee mate - core protein shake OR 1 egg, 1 piece toast  OR oatmeal  Snack: kind mini OR lemon lillian bar OR apple   Lunch: 11:30-12:30: core protein shake OR [place] or [place] Pre Made Salad with chicken, apple, pb     Snack: kind mini  Dinner:5:30:   ~3 oz protein, 1/2 cup carb, veggies   Snack: skip     Beverages: water and sparkling water  Volume of beverage intake: poor water currently     Weekends: Same  Cravings: sweets   Trouble area of day: on the way home from school    Frequency of Eating out: 2x week - chooses calorie dense foods  Food restrictions:none reported   Cooking: self   Food Shopping: self    Physical Activity Intake  Activity:Walking  Frequency:infrequently  Physical limitations/barriers to exercise: none reported     Estimated Needs  Energy  [place] Energy Needs: BMR : 1591 calories  1-2# loss weekly sedentary:  909-1409 calories             1-2# loss weekly lightly active:9796-3541 calories  Maintenance calories for sedentary activity level: 1909 calories  Protein:71-89gm     (1 2-1 5g/kg IBW)  Fluid: 69oz     (35mL/kg IBW)    Nutrition Diagnosis  Yes; Overweight/obesity  related to Excess energy intake as evidenced by  BMI more than normative standard for age and sex (obesity-grade I 26-30  9)       Nutrition Intervention    Nutrition Prescription  Calories:1400 calories   Protein:  Fluid:    Meal Plan (Ino/Pro/Carb)  Breakfast: 300/20/30  Snack:  Lunch: 300-/30/30-45  Snack: 150-200/>5/20  Dinner: 300-400/30/30-45  Snack: 150-200/>5/20    Nutrition Education:    [facility] Manual  Calorie controlled menu  Lean protein food choices  Healthy snack options  Food journaling tips      Nutrition Counseling:  Strategies: meal planning, portion sizes, healthy snack choices, hydration, fiber intake, protein intake, exercise, food journal      Monitoring and Evaluation:  Evaluation criteria:  Energy Intake  Meet protein needs  Maintain adequate hydration  Monitor weekly weight  Meal planning/preparation  Food journal   Decreased portions at mealtimes and snacks  Physical activity     Barriers to learning:none  Readiness to change: Action:  (Changing behavior)  Comprehension: good  Expected Compliance: good

## 2022-11-17 ENCOUNTER — OFFICE VISIT (OUTPATIENT)
Dept: BARIATRICS | Facility: CLINIC | Age: 47
End: 2022-11-17

## 2022-11-17 VITALS — HEIGHT: 66 IN | BODY MASS INDEX: 33.11 KG/M2 | WEIGHT: 206 LBS

## 2022-11-17 DIAGNOSIS — R63.5 ABNORMAL WEIGHT GAIN: Primary | ICD-10-CM

## 2022-11-29 NOTE — PROGRESS NOTES
[facility]:  Behavioral Health Support for weight loss  Following [facility]   In [facility] but not going to the classes, seeing [facility] for individuals instead  Has recorded weight loss since starting the program    Also has had loss since starting the program and in the grief stage  Stressed the importance of self care during this time  Self care:  Check in moments, awareness of breath, sound machine, nutrition and hydration

## 2022-12-01 ENCOUNTER — OFFICE VISIT (OUTPATIENT)
Dept: BARIATRICS | Facility: CLINIC | Age: 47
End: 2022-12-01

## 2022-12-01 VITALS — BODY MASS INDEX: 33.22 KG/M2 | WEIGHT: 205.8 LBS

## 2022-12-01 DIAGNOSIS — R63.5 ABNORMAL WEIGHT GAIN: Primary | ICD-10-CM

## 2022-12-03 ENCOUNTER — APPOINTMENT (OUTPATIENT)
Dept: LAB | Facility: MEDICAL CENTER | Age: 47
End: 2022-12-03

## 2022-12-03 DIAGNOSIS — R63.5 WEIGHT GAIN: ICD-10-CM

## 2022-12-03 DIAGNOSIS — G40.309 NONINTRACTABLE GENERALIZED IDIOPATHIC EPILEPSY WITHOUT STATUS EPILEPTICUS (HCC): ICD-10-CM

## 2022-12-03 DIAGNOSIS — E66.9 OBESITY, CLASS I, BMI 30-34.9: ICD-10-CM

## 2022-12-03 LAB
ALBUMIN SERPL BCP-MCNC: 2.8 G/DL (ref 3.5–5)
ALP SERPL-CCNC: 177 U/L (ref 46–116)
ALT SERPL W P-5'-P-CCNC: 25 U/L (ref 12–78)
ANION GAP SERPL CALCULATED.3IONS-SCNC: 3 MMOL/L (ref 4–13)
AST SERPL W P-5'-P-CCNC: 14 U/L (ref 5–45)
BASOPHILS # BLD AUTO: 0.02 THOUSANDS/ÂΜL (ref 0–0.1)
BASOPHILS NFR BLD AUTO: 0 % (ref 0–1)
BILIRUB SERPL-MCNC: 0.13 MG/DL (ref 0.2–1)
BUN SERPL-MCNC: 19 MG/DL (ref 5–25)
CALCIUM ALBUM COR SERPL-MCNC: 10 MG/DL (ref 8.3–10.1)
CALCIUM SERPL-MCNC: 9 MG/DL (ref 8.3–10.1)
CHLORIDE SERPL-SCNC: 115 MMOL/L (ref 96–108)
CHOLEST SERPL-MCNC: 227 MG/DL
CO2 SERPL-SCNC: 21 MMOL/L (ref 21–32)
CREAT SERPL-MCNC: 0.68 MG/DL (ref 0.6–1.3)
EOSINOPHIL # BLD AUTO: 0.05 THOUSAND/ÂΜL (ref 0–0.61)
EOSINOPHIL NFR BLD AUTO: 1 % (ref 0–6)
ERYTHROCYTE [DISTWIDTH] IN BLOOD BY AUTOMATED COUNT: 12.3 % (ref 11.6–15.1)
EST. AVERAGE GLUCOSE BLD GHB EST-MCNC: 94 MG/DL
GFR SERPL CREATININE-BSD FRML MDRD: 105 ML/MIN/1.73SQ M
GLUCOSE P FAST SERPL-MCNC: 96 MG/DL (ref 65–99)
HBA1C MFR BLD: 4.9 %
HCT VFR BLD AUTO: 41.3 % (ref 34.8–46.1)
HDLC SERPL-MCNC: 74 MG/DL
HGB BLD-MCNC: 13.3 G/DL (ref 11.5–15.4)
IMM GRANULOCYTES # BLD AUTO: 0.02 THOUSAND/UL (ref 0–0.2)
IMM GRANULOCYTES NFR BLD AUTO: 0 % (ref 0–2)
INSULIN SERPL-ACNC: 10.7 MU/L (ref 3–25)
LDLC SERPL CALC-MCNC: 136 MG/DL (ref 0–100)
LYMPHOCYTES # BLD AUTO: 3.1 THOUSANDS/ÂΜL (ref 0.6–4.47)
LYMPHOCYTES NFR BLD AUTO: 45 % (ref 14–44)
MCH RBC QN AUTO: 30.8 PG (ref 26.8–34.3)
MCHC RBC AUTO-ENTMCNC: 32.2 G/DL (ref 31.4–37.4)
MCV RBC AUTO: 96 FL (ref 82–98)
MONOCYTES # BLD AUTO: 0.37 THOUSAND/ÂΜL (ref 0.17–1.22)
MONOCYTES NFR BLD AUTO: 5 % (ref 4–12)
NEUTROPHILS # BLD AUTO: 3.39 THOUSANDS/ÂΜL (ref 1.85–7.62)
NEUTS SEG NFR BLD AUTO: 49 % (ref 43–75)
NRBC BLD AUTO-RTO: 0 /100 WBCS
PLATELET # BLD AUTO: 327 THOUSANDS/UL (ref 149–390)
PMV BLD AUTO: 9.5 FL (ref 8.9–12.7)
POTASSIUM SERPL-SCNC: 4.1 MMOL/L (ref 3.5–5.3)
PROT SERPL-MCNC: 7.5 G/DL (ref 6.4–8.4)
RBC # BLD AUTO: 4.32 MILLION/UL (ref 3.81–5.12)
SODIUM SERPL-SCNC: 139 MMOL/L (ref 135–147)
TRIGL SERPL-MCNC: 83 MG/DL
TSH SERPL DL<=0.05 MIU/L-ACNC: 1.76 UIU/ML (ref 0.45–4.5)
VIT B12 SERPL-MCNC: 570 PG/ML (ref 100–900)
WBC # BLD AUTO: 6.95 THOUSAND/UL (ref 4.31–10.16)

## 2022-12-05 ENCOUNTER — TELEPHONE (OUTPATIENT)
Dept: BARIATRICS | Facility: CLINIC | Age: 47
End: 2022-12-05

## 2022-12-05 NOTE — TELEPHONE ENCOUNTER
----- Message from ALFIE Tejada sent at 12/4/2022 10:49 PM EST -----  Please let the patient know I have reviewed her A1C, fasting insulin, and lipid panel and Chol and LDL (bad cholesterol) were both a bit elevated  Both will likely improve with weight loss  Otherwise, labs were within acceptable range

## 2022-12-05 NOTE — TELEPHONE ENCOUNTER
Pt called back I made her aware of her lab she also stated that she wanted to talk about going a weight loss medication

## 2022-12-14 ENCOUNTER — TELEPHONE (OUTPATIENT)
Dept: NEUROLOGY | Facility: CLINIC | Age: 47
End: 2022-12-14

## 2022-12-16 ENCOUNTER — OFFICE VISIT (OUTPATIENT)
Dept: BARIATRICS | Facility: CLINIC | Age: 47
End: 2022-12-16

## 2022-12-16 VITALS
SYSTOLIC BLOOD PRESSURE: 110 MMHG | RESPIRATION RATE: 16 BRPM | WEIGHT: 208.4 LBS | DIASTOLIC BLOOD PRESSURE: 70 MMHG | BODY MASS INDEX: 33.49 KG/M2 | HEART RATE: 100 BPM | HEIGHT: 66 IN

## 2022-12-16 DIAGNOSIS — E66.9 OBESITY, CLASS I, BMI 30-34.9: Primary | ICD-10-CM

## 2022-12-16 DIAGNOSIS — G43.109 MIGRAINE WITH AURA AND WITHOUT STATUS MIGRAINOSUS, NOT INTRACTABLE: ICD-10-CM

## 2022-12-16 DIAGNOSIS — F32.A DEPRESSION: ICD-10-CM

## 2022-12-16 DIAGNOSIS — G40.309 NONINTRACTABLE GENERALIZED IDIOPATHIC EPILEPSY WITHOUT STATUS EPILEPTICUS (HCC): ICD-10-CM

## 2022-12-16 RX ORDER — NORETHINDRONE ACETATE AND ETHINYL ESTRADIOL 1; 20 MG/1; UG/1
TABLET ORAL
COMMUNITY
Start: 2022-10-18

## 2022-12-16 NOTE — PROGRESS NOTES
Assessment/Plan:     Obesity, Class I, BMI 30-34 9  - Patient is pursuing [facility] in lieu of classes  - Initial weight loss goal of 5-10% weight loss for improved health  - Already on Topamax for history of epilepsy   - Not a candidate for Wellbutrin or Contrave due to seizure history  - Avoid phentermine given history of seizures  - Labs reviewed: lipid panel, A1C, and fasting insulin 12/3/2022  Chol and LDL elevated, which will likely improve with weight loss, otherwise, labs within acceptable range  - Patient denies personal history of pancreatitis  Patient also denies personal and family history of medullary thyroid cancer and multiple endocrine neoplasia type 2 (MEN 2 tumor)  - Discussed weight loss medications  Limited in options for weight loss medications given history of epilepsy  However, Jose David Vickers is covered by her insurance  She is not thrilled about an injectable  Also discussed metformin  She would like some time to think about it  Additionally, she will work on starting food logging and will be starting to go to the gym  Initial: 213 lbs BMI 34 38  Current: 208 4 lbs BMI 33 64  Change: -4 6 lbs  Goal: 150 lbs    Goals:  Do not skip meals  Food log (ie ) www myfitnesspal com,sparkpeople  com,loseit com,calorieking  com,etc  baritastic (use skinnytaste  com, dietdoctor  com or smartphone katalina [employer] for recipes)  No sugary beverages  At least 64oz of water daily  Increase physical activity by 10 minutes daily  Gradually increase physical activity to a goal of 5 days per week for 30 minutes of MODERATE intensity PLUS 2 days per week of FULL BODY resistance training (use smartphone apps [employer], Home Workout, etc )  Start food logging, weighing and measuring food  Increase water intake to at least 64 oz daily  Start gym as planned with gradual increase to 5 days per week cardiovascular exercise with 2 days resistance training     Continue nutrition recommendations per dietician  Depression  - Taking zoloft  Continue management with prescribing provider  Nonintractable generalized 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 ([facility] )  - Taking Topamax  Continue management with prescribing provider  Migraine with aura and without status migrainosus, not intractable  - Taking Topamax and Treximet as needed  Continue management with prescribing provider  Yaritza Parks was seen today for follow-up  Diagnoses and all orders for this visit:    Obesity, Class I, BMI 30-34 9    Depression    Nonintractable generalized 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 ([facility] [address] )    Migraine with aura and without status migrainosus, not intractable          Follow up in approximately 2 months with Non-Surgical Physician/Advanced Practitioner  Subjective:   Chief Complaint   Patient presents with   • Follow-up     MWM 2mth f/u; waist 39 5in       Patient ID: Manuela Haddad  is a 56 y o  female with excess weight/obesity here to pursue weight management  Patient is pursuing [facility] in lieu of classes  Most recent notes and records were reviewed  HPI    Wt Readings from Last 10 Encounters:   12/16/22 94 5 kg (208 lb 6 4 oz)   12/01/22 93 4 kg (205 lb 12 8 oz)   11/17/22 93 4 kg (206 lb)   10/05/22 96 5 kg (212 lb 12 oz)   09/29/22 97 3 kg (214 lb 9 6 oz)   09/22/22 98 1 kg (216 lb 3 2 oz)   09/12/22 97 8 kg (215 lb 11 2 oz)   08/19/22 96 6 kg (213 lb)   08/17/22 97 1 kg (214 lb)   04/27/22 96 4 kg (212 lb 9 6 oz)     Has been under significant stress  Mom passed away last month from lung cancer  Has also been supporting her son, who lost several college classmates to a recent tragic car accident  Has not been food logging       B- oatmeal or protein shake and banana  S- none or apple  L- protein shake  S- none or apple  D- pork chops and veggie and starch  S- popcorn     Hydration:32-64 oz of water, 1 cup coffee with splenda and nonfat creamer, 1 cup unsweetened almond milk in smoothie     Alcohol: none  Smoking: none  Exercise: none, just joined [facility]  [employer]  Occupation: teacher   Sleep: 4-8 hours       Colonoscopy: UTD, due Feb 2026  Mammogram: gets through [facility], UTD, due October 2023           The following portions of the patient's history were reviewed and updated as appropriate: allergies, current medications, past family history, past medical history, past social history, past surgical history, and problem list     Family History   Problem Relation Age of Onset   • Lung cancer Mother    • Breast cancer Mother    • Stroke Mother    • Hyperlipidemia Mother    • Hyperlipidemia Father    • Macular degeneration Father    • Multiple sclerosis Sister    • Seizures Brother    • Seizures Daughter    • Seizures Son    • Heart disease Maternal Grandmother    • Heart disease Maternal Grandfather    • Seizures Paternal Grandmother    • Lung cancer Paternal Grandfather    • Diabetes Neg Hx    • Thyroid disease Neg Hx         Review of Systems   HENT: Negative for sore throat  Respiratory: Negative for cough and shortness of breath  Cardiovascular: Negative for chest pain and palpitations  Gastrointestinal: Positive for nausea (comes and goes)  Negative for abdominal pain, constipation, diarrhea and vomiting  Denies GERD   Musculoskeletal: Positive for arthralgias (chronic) and back pain (chronic)  Skin: Negative for rash  Psychiatric/Behavioral: Negative for suicidal ideas (or HI)  Denies depression and anxiety       Objective:  /70   Pulse 100   Resp 16   Ht 5' 6" (1 676 m)   Wt 94 5 kg (208 lb 6 4 oz)   BMI 33 64 kg/m²     Physical Exam  Vitals and nursing note reviewed  Constitutional   General appearance: Abnormal   well developed and obese  Eyes No conjunctival injection     Ears, Nose, Mouth, and Throat Oral mucosa moist    Pulmonary   Respiratory effort: No increased work of breathing or signs of respiratory distress  Cardiovascular     Examination of extremities for edema and/or varicosities: Normal   no edema  Abdomen   Abdomen: Abnormal   The abdomen was obese      Musculoskeletal   Normal range of motion  Neurological   Gait and station: Normal     Psychiatric   Orientation to person, place and time: Normal     Affect: appropriate

## 2022-12-16 NOTE — ASSESSMENT & PLAN NOTE
- Patient is pursuing [facility] in lieu of classes  - Initial weight loss goal of 5-10% weight loss for improved health  - Already on Topamax for history of epilepsy   - Not a candidate for Wellbutrin or Contrave due to seizure history  - Avoid phentermine given history of seizures  - Labs reviewed: lipid panel, A1C, and fasting insulin 12/3/2022  Chol and LDL elevated, which will likely improve with weight loss, otherwise, labs within acceptable range  - Patient denies personal history of pancreatitis  Patient also denies personal and family history of medullary thyroid cancer and multiple endocrine neoplasia type 2 (MEN 2 tumor)  - Discussed weight loss medications  Limited in options for weight loss medications given history of epilepsy  However, Pema Ng is covered by her insurance  She is not thrilled about an injectable  Also discussed metformin  She would like some time to think about it  Additionally, she will work on starting food logging and will be starting to go to the gym  Initial: 213 lbs BMI 34 38  Current: 208 4 lbs BMI 33 64  Change: -4 6 lbs  Goal: 150 lbs    Goals:  Do not skip meals  Food log (ie ) www myfitnesspal com,sparkpeople  com,loseit com,calorieking  com,etc  baritastic (use skinnytaste  com, dietdoctor  com or smartphone katalina [employer] for recipes)  No sugary beverages  At least 64oz of water daily  Increase physical activity by 10 minutes daily  Gradually increase physical activity to a goal of 5 days per week for 30 minutes of MODERATE intensity PLUS 2 days per week of FULL BODY resistance training (use smartphone apps [employer], Home Workout, etc )  Start food logging, weighing and measuring food  Increase water intake to at least 64 oz daily  Start gym as planned with gradual increase to 5 days per week cardiovascular exercise with 2 days resistance training  Continue nutrition recommendations per dietician

## 2022-12-28 ENCOUNTER — OFFICE VISIT (OUTPATIENT)
Dept: NEUROLOGY | Facility: CLINIC | Age: 47
End: 2022-12-28

## 2022-12-28 VITALS
TEMPERATURE: 96.8 F | WEIGHT: 205 LBS | BODY MASS INDEX: 32.95 KG/M2 | SYSTOLIC BLOOD PRESSURE: 120 MMHG | HEIGHT: 66 IN | HEART RATE: 92 BPM | DIASTOLIC BLOOD PRESSURE: 66 MMHG

## 2022-12-28 DIAGNOSIS — G40.309 NONINTRACTABLE GENERALIZED IDIOPATHIC EPILEPSY WITHOUT STATUS EPILEPTICUS (HCC): Primary | ICD-10-CM

## 2022-12-28 DIAGNOSIS — G43.109 MIGRAINE WITH AURA AND WITHOUT STATUS MIGRAINOSUS, NOT INTRACTABLE: ICD-10-CM

## 2022-12-28 NOTE — PROGRESS NOTES
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  Her epilepsy is complicated by medication side effects with topiramate causing paresthesia and cognitive impairments  Reduction in her topiramate dose has helped reduce her cognitive complaints  She was also found to have B12 deficiency and was started on a B12 supplement  Her migraines are currently well controlled  She is also now working with [employer] and is working diet, recently joined [employer], and has been losing weight, which is helpful  Reminded her to stay well hydrated, which she does  Plan:   Epilepsy:  1 - continue topiramate 150mg (one and a half tab 100mg tab) twice a day  2 - call the office if any recurrent seizure      Migraines:  1 - may continue to use Treximet at onset of migraine  No more than 2 doses in 24hrs  2 - advised staying hydrated with 64oz of water daily, not skipping meals, and getting adequate sleep  3 - keep a calendar of your migraine headache frequency and treatment  This may inform us if migraines worsen with reduction of topiramate  Anxiety:  1 - can continue to take sertraline 50mg take 2 5 tabs (125mg) daily for anxiety and sleep (this was previously being prescribed by Dr Celia Erickson who she was seeing before transfer to the epilepsy center)  2 - may take one clonazepam 0 5mg tab as needed for anxiety attack  B12 deficiency:  1 - continue OTC B12 1000mcg daily     She will return in 4 months or sooner if needed           Diagnoses and all orders for this visit:    Nonintractable generalized 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 ([facility] [address] )    Migraine with aura and without status migrainosus, not intractable           Subjective:    HPI    Caio Zhou is a 56 y o  right handed female here for follow-up evaluation of epilepsy, migraines, and paresthesia  Interval history 12/28/2022:  Patient reports she is doing well from a neurologic standpoint  She denies any seizures, headaches are currently well controlled  She is now following with medical weight loss center and working on diet and exercise, has lost some weight over the past few months  She does note her mother passed away in October, which was very tough on her  She took a leave from work but will return next week  AED/side effects/compliance:  topiramate 150-150  Paresthesia, cognitive impairment (worse at higher doses, improved with dose reduction)     Event/Seizure semiology:  1  Absence seizures  2  Possible generalized tonic clonic seizure     Intake History 6/11/2021  She was diagnosed with absence seizures when she was 10years old, she was put on Depakote, there were no further seizures  She noticed that she was losing her hair on Depakote  She was off medication since she was 16years old  She was off AED for many years until she was 30years old  When she was 30years old, six months after she had her son, she sent a an unusual text message to her  with random numbers  He called her back to find out what happened  She does not remember what happened  She noticed that she had showered but she does not recall showering  She recalled biting her tongue  It was suspected that she had a grand mal seizure  She saw Dr Emilee Black at [facility] around 2003/2004  Probably due to her migraine headaches, she was put on topiramate to manage both conditions  She was planning a pregnant at 28years old, transitioned from topiramate to lamotrigine  She unfortunately had a seizure  She lost this pregnancy due to genetic disorder   Then 4 months later she was pregnant with twins; she had stayed on lamotrigine and was constantly checking her lamotrigine levels  After she delivered her twins, she went right back on topiramate  When she is on topiramate, she struggles with word finding difficulty (I feel stupid)  She does not recall if lamotrigine was causing significant side effects  Every since 2006, she has been on topiramate  She has always been on topiramate 200mg twice a day; she does not recall ever trying a lower dose of medication  One night she woke up in a panic, like the world was going to end, she had difficulty thinking and putting her thoughts together, she was not able to calm down her nerves or thoughts, mind was racing like crazy, panic feeling lasted 5 minutes, she was worried that a seizure was going to happen  She was fairly coherent, but did not progress to altered awareness  She has had anxiety attacks in the past, she gets aura of panic, which then can result in a migraine headaches  She started to have migraines around middle school  She gets migraines that can last a week, just before her menses or weather changes  Headache is so severe she has to go into a hole, severely nauseated  Head pressure sensation over the front of head or back of the neck  She will try Advil 2 tabs to take the edge off and Coke or coffee  When she was pregnant she did not have migraines  Topiramate may help with migraines  She does not get a lot of migraines (maybe 5-6 in the last 6 months)  If her migraines are severe she has "major" medication, compound medication that cost $400  (Looking at the list of medication dichloralphenazone is no longer available in the [place], which contains chloral hydrate )  She had tried Maxalt but it did not work  It may have been sumatriptan  She may have used dexamethasone to abort headaches  She is currently on sumatriptan       Prior records from [facility]:  Last visit 1/23/2020 - she was prescribed Midrin (isometheptene-acetaminophen-dichloralphenazone) and Treximet for migraines  (she was having 5-10 days/month with headache, required steroids, under a lot of stress )  She has restless legs, previously prescribed Quinine but she just drinks tonic water now  Last seizure was in 2006; possible aura of panic, whole body warmth sensation  Visit on 12/5/2018 - she was having 1-2 migraines/month     Interval history 4/27/2022:  Patient was last seen 10 months ago  At timing of last visit, decision was made to reduce topiramate in order to alleviate side effects (she c/o cognitive dysfunction, paresthesia)  Plan was to reduce from 200mg BID to 150mg BID  She tells me today she has been taking topiramate 150mg AM and 200mg PM, says she did not know the plan was to reduce to 150mg BID  She is still having brain fog, cognitive dysfunction  She has trouble with word finding  No issues with ADLs, driving etc  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, 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  She denies any seizures since her last visit  Migraines have been “Stable”  she uses Treximet at onset of migraine  She is requesting Zofran, has previously had Rx for this, but not recently  She needs refills of all of her medications  She was previously seen by Dr Brennan Marcelo who had been refilling her Zoloft and she needs a refill of that  She also needs refill of clonazepam, which she has been prescribed for anxiety to use PRN  Interval history 8/17/2022:  Patient reports she is overall doing well since last visit  Her topiramate was reduced to 150mg BID  She also had labs done which showed low B12 and she started taking a B12 supplement  She feels she is doing better from a cognitive standpoint with these changes  She no longer has significant issues searching for words  Her headaches were doing well but increased in the last few weeks   She recently changed birth control pills to a lower dose pill and feels that may be causing increased headaches  She just completed her first month of the new medication  She reports she is struggling to lose weight  She is not sure she is eating the right things or not  She has been doing some research on gut health and hormones  Woman of childbearing age with Epilepsy:  Contraception: Seasonale  Folic acid supplement: No     Prior Epilepsy History:  x     Special Features  Status epilepticus: No  Self Injury Seizures: No  Precipitating Factors: None  Post-ictal state: confusion     Epilepsy Risk Factors:  Abnormal pregnancy: No  Abnormal birth/: No  Abnormal Development: No  Febrile seizures, simple: No  Febrile seizures, complex: No  CNS infection: No  Mental retardation: No  Cerebral palsy: No  Head injury (moderate/severe): No  CNS neoplasm: No  CNS malformation: No  Neurosurgical procedure: No  Stroke: No  CNS autoimmune disorder: No  Alcohol abuse: No  Drug abuse: No  Family history Sz/epilepsy: her son (absence and grand mal seizures) and and one of her twin daughters (grand mal seizure at 3years old); brother with febrile seizure; one brother with seizure     Prior AEDs:  medication Max dose Time used Reason to stop   divalpreox     Hair loss   lamotrigine         topiramate         Tegretol (as a child)            PRIOR Migraine trials  Abortives - Frova (side effects), Maxalt and Zomig (no help), advil, Zofran, tylenol, flexerile     Prior workup:  x  [id number] - MRI brain  4-5 tiny foci of T2/FLAIR hyperintensity scattered in the subcortical white matter of both hemispheres       8/10/2015 - MRI brain [facility])  Right cerebellar tonsil protrudes through the foramen magnum unchanged from MRI 2010     EEGs:  None available     Labs:  12/3/22  Component Value Flag Ref Range Units Status   Vitamin B-12 570   100 - 900 pg/mL Final     Ref Range Units Status      Sodium 139   135 - 147 mmol/L Final   Potassium 4 1   3 5 - 5 3 mmol/L Final   Chloride 115   High   96 - 108 mmol/L Final   CO2 21   21 - 32 mmol/L Final   ANION GAP 3   Low   4 - 13 mmol/L Final   BUN 19   5 - 25 mg/dL Final   Creatinine 0 68   0 60 - 1 30 mg/dL Final   Comment:   Standardized to IDMS reference method   Glucose, Fasting 96   65 - 99 mg/dL Final   Comment:   Specimen collection should occur prior to Sulfasalazine administration due to the potential for falsely depressed results  Specimen collection should occur prior to Sulfapyridine administration due to the potential for falsely elevated results  Calcium 9 0   8 3 - 10 1 mg/dL Final   Corrected Calcium 10 0   8 3 - 10 1 mg/dL Final   AST 14   5 - 45 U/L Final   Comment:   Specimen collection should occur prior to Sulfasalazine administration due to the potential for falsely depressed results  ALT 25   12 - 78 U/L Final   Comment:   Specimen collection should occur prior to Sulfasalazine and/or Sulfapyridine administration due to the potential for falsely depressed results  Alkaline Phosphatase 177   High   46 - 116 U/L Final   Total Protein 7 5   6 4 - 8 4 g/dL Final   Albumin 2 8   Low   3 5 - 5 0 g/dL Final   Total Bilirubin 0 13   Low   0 20 - 1 00 mg/dL Final   Comment:   Use of this assay is not recommended for patients undergoing treatment with eltrombopag due to the potential for falsely elevated results     eGFR 105    ml/min/1 73sq m Final       Component Value Flag Ref Range Units Status   WBC 6 95   4 31 - 10 16 Thousand/uL Final   RBC 4 32   3 81 - 5 12 Million/uL Final   Hemoglobin 13 3   11 5 - 15 4 g/dL Final   Hematocrit 41 3   34 8 - 46 1 % Final   MCV 96   82 - 98 fL Final   MCH 30 8   26 8 - 34 3 pg Final   MCHC 32 2   31 4 - 37 4 g/dL Final   RDW 12 3   11 6 - 15 1 % Final   MPV 9 5   8 9 - 12 7 fL Final   Platelets 962   124 - 390 Thousands/uL Final   nRBC 0    /100 WBCs Final   Neutrophils Relative 49   43 - 75 % Final   Immat GRANS % 0   0 - 2 % Final   Lymphocytes Relative 45   High   14 - 44 % Final   Monocytes Relative 5   4 - 12 % Final   Eosinophils Relative 1   0 - 6 % Final   Basophils Relative 0   0 - 1 % Final   Neutrophils Absolute 3 39   1 85 - 7 62 Thousands/µL Final   Immature Grans Absolute 0 02   0 00 - 0 20 Thousand/uL Final   Lymphocytes Absolute 3 10   0 60 - 4 47 Thousands/µL Final   Monocytes Absolute 0 37   0 17 - 1 22 Thousand/µL Final   Eosinophils Absolute 0 05   0 00 - 0 61 Thousand/µL Final   Basophils Absolute 0 02   0 00 - 0 10 Thousands/µL Final         The following portions of the patient's history were reviewed and updated as appropriate: current medications, past family history, past medical history, past social history, past surgical history and problem list       Objective:    Blood pressure 120/66, pulse 92, temperature (!) 96 8 °F (36 °C), height 5' 6" (1 676 m), weight 93 kg (205 lb)    Physical Exam  Constitutional:       Appearance: Normal appearance  HENT:      Head: Normocephalic and atraumatic  Eyes:      Extraocular Movements: EOM normal       Pupils: Pupils are equal, round, and reactive to light  Neurological:      Mental Status: She is alert  Motor: Motor strength is normal       Deep Tendon Reflexes: Reflexes are normal and symmetric  Psychiatric:         Mood and Affect: Mood normal          Speech: Speech normal          Behavior: Behavior normal          Neurological Exam  Mental Status  Alert  Oriented to person, place, time and situation  Speech is normal  Language is fluent with no aphasia  Attention and concentration are normal     Cranial Nerves  CN II: Visual fields full to confrontation  CN III, IV, VI: Extraocular movements intact bilaterally  Pupils equal round and reactive to light bilaterally  CN V: Facial sensation is normal   CN VII: Full and symmetric facial movement    CN VIII: Hearing is normal   CN IX, X: Palate elevates symmetrically  CN XI: Shoulder shrug strength is normal   CN XII: Tongue midline without atrophy or fasciculations  Motor   Normal muscle tone  Strength is 5/5 throughout all four extremities  Sensory  Light touch is normal in upper and lower extremities  Reflexes  Deep tendon reflexes are 2+ and symmetric in all four extremities  Coordination  Right: Finger-to-nose normal Left: Finger-to-nose normal     Gait  Casual gait is normal including stance, stride, and arm swing  Current Outpatient Medications   Medication Sig Dispense Refill   • clonazePAM (KlonoPIN) 0 5 mg tablet Take one tab by mouth for severe panic attack  5 tablet 0   • Cyanocobalamin (VITAMIN B-12 PO) Take by mouth     • Junel  1-20 MG-MCG per tablet      • ondansetron (Zofran ODT) 4 mg disintegrating tablet Take 1 tablet (4 mg total) by mouth every 6 (six) hours as needed for nausea or vomiting 20 tablet 0   • Probiotic Product (PROBIOTIC DAILY PO) Take by mouth     • sertraline (ZOLOFT) 50 mg tablet Take 2 5 tablets (125 mg total) by mouth daily 225 tablet 3   • SUMAtriptan-naproxen (TREXIMET)  MG per tablet Take 1 po at onset of migraine, may repeat x 1 in 24hrs 10 tablet 1   • topiramate (TOPAMAX) 100 mg tablet Take 1 5 tablets (150 mg total) by mouth 2 (two) times a day 270 tablet 2     No current facility-administered medications for this visit  Allergies   Allergen Reactions   • Tetracycline Rash     Other reaction(s):  Other (See Comments)  throat closes      Past Medical History:   Diagnosis Date   • [facility] )    • Interstitial cystitis    • Migraines    • Seizures ([facility] )       Past Surgical History:   Procedure Laterality Date   •  SECTION     • UPPER GASTROINTESTINAL ENDOSCOPY        Family History   Problem Relation Age of Onset   • Lung cancer Mother    • Breast cancer Mother    • Stroke Mother    • Hyperlipidemia Mother    • Atrial fibrillation Mother    • Hyperlipidemia Father    • Macular degeneration Father    • Multiple sclerosis Sister • Seizures Brother    • Atrial fibrillation Maternal Aunt    • Heart disease Maternal Grandmother    • Heart disease Maternal Grandfather    • Seizures Paternal Grandmother    • Lung cancer Paternal Grandfather    • Seizures Daughter    • Seizures Son    • Diabetes Neg Hx    • Thyroid disease Neg Hx       Past Psychiatric History:  Depression: yes  Anxiety: yes  Psychosis: No     Social History  Living situation:  Lives with family  Work:    Driving:  Yes   reports that she has never smoked  She has never used smokeless tobacco  She reports current alcohol use  She reports that she does not use drugs  ROS:    Review of Systems   Constitutional: Negative  Negative for appetite change and fever  HENT: Negative  Negative for hearing loss, tinnitus, trouble swallowing and voice change  Eyes: Negative  Negative for photophobia, pain and visual disturbance  Respiratory: Negative  Negative for shortness of breath  Cardiovascular: Negative  Negative for palpitations  Gastrointestinal: Negative  Negative for nausea and vomiting  Endocrine: Negative  Negative for cold intolerance  Genitourinary: Negative  Negative for dysuria, frequency and urgency  Musculoskeletal: Negative  Negative for gait problem, myalgias and neck pain  Skin: Negative  Negative for rash  Allergic/Immunologic: Negative  Neurological: Negative  Negative for dizziness, tremors, seizures, syncope, facial asymmetry, speech difficulty, weakness, light-headedness, numbness and headaches  Hematological: Negative  Does not bruise/bleed easily  Psychiatric/Behavioral: Negative  Negative for confusion, hallucinations and sleep disturbance  All other systems reviewed and are negative      I personally reviewed and updated the ROS as appropriate

## 2022-12-28 NOTE — PATIENT INSTRUCTIONS
Plan:   Epilepsy:  1 - continue topiramate 150mg (one and a half tab 100mg tab) twice a day  2 - call the office if any recurrent seizure      Migraines:  1 - may continue to use Treximet at onset of migraine  No more than 2 doses in 24hrs  2 - advised staying hydrated with 64oz of water daily, not skipping meals, and getting adequate sleep  3 - keep a calendar of your migraine headache frequency and treatment  This may inform us if migraines worsen with reduction of topiramate  Anxiety:  1 - can continue to take sertraline 50mg take 2 5 tabs (125mg) daily for anxiety and sleep   2 - may take one clonazepam 0 5mg tab as needed for anxiety attack  B12 deficiency:  1 - continue OTC B12 1000mcg daily     She will return in 4 months or sooner if needed

## 2023-02-16 ENCOUNTER — TELEPHONE (OUTPATIENT)
Dept: NEUROLOGY | Facility: CLINIC | Age: 48
End: 2023-02-16

## 2023-02-16 DIAGNOSIS — G43.109 MIGRAINE WITH AURA AND WITHOUT STATUS MIGRAINOSUS, NOT INTRACTABLE: Primary | ICD-10-CM

## 2023-02-16 NOTE — TELEPHONE ENCOUNTER
Patient left voicemail to report new symptoms  States since Sunday she has been experiencing intermittent dizziness  Comes in waves  Feels as if the room is spinning  Associated with nausea  Call returned to patient  [phone]  Acknowledged voicemail received  Unknown trigger  Sunday experienced room spinning for 40 minutes  Today reporting headache she believes due to barometric pressure, no dizziness  Describes it as an "odd feeling", wavy feeling  Uneasy feeling  Not daily dizziness  Feels like head is going back and forth  Blood pressure 100/80 when at school, states she feels almost like her sugar is low at times, sluggish  Sinus infection 2 5 weeks ago - took amoxicillin for 10 days with improvement  No other s/s of illness or infection  Denies other new medication changes

## 2023-02-17 RX ORDER — DEXAMETHASONE 2 MG/1
TABLET ORAL
Qty: 3 TABLET | Refills: 0 | Status: SHIPPED | OUTPATIENT
Start: 2023-02-17

## 2023-02-17 NOTE — TELEPHONE ENCOUNTER
Spoke to patient  Denies previous vertigo symptoms  Still coming and going  Not room spinning, more wavelike sensation  No real headaches, just a "tinge" of a headache  Patient is agreeable to steroid  [facility] send to [facility]

## 2023-02-17 NOTE — TELEPHONE ENCOUNTER
Steroid sent  If no improvement, would also see her PCP to r/o anything else going on    If any worsening symptoms or new neurologic symptoms, recommend ED eval

## 2023-02-17 NOTE — TELEPHONE ENCOUNTER
Has she ever had vertigo before? If the vertigo occurring with head movements? Possibly triggered by the sinus infection  Is her headache severe?   We could try a steroid  (dexamethasone) to see if that would help the HA and vertigo, if that was triggered by the sinus infection

## 2023-03-20 ENCOUNTER — OFFICE VISIT (OUTPATIENT)
Dept: PODIATRY | Facility: CLINIC | Age: 48
End: 2023-03-20

## 2023-03-20 ENCOUNTER — APPOINTMENT (OUTPATIENT)
Dept: RADIOLOGY | Facility: MEDICAL CENTER | Age: 48
End: 2023-03-20

## 2023-03-20 VITALS — BODY MASS INDEX: 33.09 KG/M2 | HEIGHT: 66 IN

## 2023-03-20 DIAGNOSIS — M21.611 BUNION OF GREAT TOE OF RIGHT FOOT: ICD-10-CM

## 2023-03-20 DIAGNOSIS — M21.42 PES PLANUS OF BOTH FEET: ICD-10-CM

## 2023-03-20 DIAGNOSIS — M19.079 ANKLE ARTHRITIS: ICD-10-CM

## 2023-03-20 DIAGNOSIS — M19.90 OSTEOARTHRITIS OF MIDFOOT DUE TO INFLAMMATORY ARTHRITIS: ICD-10-CM

## 2023-03-20 DIAGNOSIS — M79.671 RIGHT FOOT PAIN: ICD-10-CM

## 2023-03-20 DIAGNOSIS — M21.41 PES PLANUS OF BOTH FEET: ICD-10-CM

## 2023-03-20 DIAGNOSIS — M19.279 OSTEOARTHRITIS OF MIDFOOT DUE TO INFLAMMATORY ARTHRITIS: ICD-10-CM

## 2023-03-20 DIAGNOSIS — M79.671 RIGHT FOOT PAIN: Primary | ICD-10-CM

## 2023-03-20 PROBLEM — M21.40 FLAT FOOT: Status: ACTIVE | Noted: 2023-03-20

## 2023-03-20 RX ORDER — METHYLPREDNISOLONE 4 MG/1
TABLET ORAL
Qty: 1 EACH | Refills: 0 | Status: SHIPPED | OUTPATIENT
Start: 2023-03-20

## 2023-03-20 NOTE — PROGRESS NOTES
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  -     Orthotics B/L  -     methylPREDNISolone 4 MG tablet therapy pack; Use as directed on package    Bunion of great toe of right foot  -     Orthotics B/L  -     methylPREDNISolone 4 MG tablet therapy pack; Use as directed on package    Pes planus of both feet  -     Orthotics B/L  -     methylPREDNISolone 4 MG tablet therapy pack; Use as directed on package      -She does have a family history of MS, and the clinical findings do not necessarily mirror the findings on x-ray  - X-rays 3 views weightbearing taken reviewed of the right foot and do show pes planus orientation of the right foot with mass metatarsal adductus and adductovarus hammertoes 4, 5 with decrease of medial longitudinal arch and also decrease in calcaneal inclination angle  - I believe that she has had likely having early onset ankle arthritis and also midfoot arthritis, it is possible that there is a fibrous coalition between the calcaneocuboid area  - We discussed all these options, most of them would begin with the same management which would be some form of bracing which in this case we will begin utilizing super feet orthotics and rigid shoes at all times and also anti-inflammatories  - I prescribed her a Medrol Dosepak for continued management of her inflammatory pain  - I discussed at length with her that due to her chronic foot deformity with the severe joint pain that she is having at that she age, she would benefit from use and application of custom orthotics, advised on sizing and dispensing  - She is to return in 1 month for further assessment of her pain, she is still in pain we will plan for referral for CT and also intra-articular steroid injection    Subjective:      Patient ID: Nicanor Pierce is a 56 y o  female  Patient presents for evaluation management of her right foot pain  This is been going on for some time, she states that she did have a previous ankle fracture on that side but did not have surgical intervention, she has not gone to physical therapy in the past   She reports that she feels unstable on uneven ground and does feel as though this area is going to sprain  She is complaining of swelling on the lateral aspect of her right ankle and pain across the midfoot and also pain across the top of her right ankle as well  The following portions of the patient's history were reviewed and updated as appropriate: allergies, current medications, past family history, past medical history, past social history, past surgical history and problem list     Review of Systems   Constitutional: Negative for chills and fever  HENT: Negative for ear pain and sore throat  Eyes: Negative for pain and visual disturbance  Respiratory: Negative for cough and shortness of breath  Cardiovascular: Negative for chest pain and palpitations  Gastrointestinal: Negative for abdominal pain and vomiting  Genitourinary: Negative for dysuria and hematuria  Musculoskeletal: Negative for arthralgias and back pain  Skin: Negative for color change and rash  Neurological: Negative for seizures and syncope  All other systems reviewed and are negative  Objective:      Ht 5' 6" (1 676 m)   BMI 33 09 kg/m²          Physical Exam  Vitals reviewed  Constitutional:       Appearance: Normal appearance  HENT:      Head: Normocephalic and atraumatic        Nose: Nose normal       Mouth/Throat:      Mouth: Mucous membranes are moist    Eyes:      Pupils: Pupils are equal, round, and reactive to light  Cardiovascular:      Pulses: Normal pulses  Pulmonary:      Effort: Pulmonary effort is normal    Musculoskeletal:         General: Tenderness and deformity present  Comments: Pes planus deformity of the right foot pain with range of motion of the TMT J's especially the lateral TMT's 4, 5  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

## 2023-03-20 NOTE — LETTER
March 21, 2023     Kasotajulián Andujar DO  [address] [facility] [address]    Patient: Henok Sultana   YOB: 1975   Date of Visit: 3/20/2023       Dear Dr Sheryle Emory: Thank you for referring Micheal Bowie to me for evaluation  Below are my notes for this consultation  If you have questions, please do not hesitate to call me  I look forward to following your patient along with you  Sincerely,        Richard Saucedo DPM        CC: No Recipients  Kimberly Whyte  3/20/2023  3:56 PM  Signed  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  -     Orthotics B/L  -     methylPREDNISolone 4 MG tablet therapy pack; Use as directed on package    Bunion of great toe of right foot  -     Orthotics B/L  -     methylPREDNISolone 4 MG tablet therapy pack; Use as directed on package    Pes planus of both feet  -     Orthotics B/L  -     methylPREDNISolone 4 MG tablet therapy pack; Use as directed on package     -She does have a family history of MS, and the clinical findings do not necessarily mirror the findings on x-ray  - X-rays 3 views weightbearing taken reviewed of the right foot and do show pes planus orientation of the right foot with mass metatarsal adductus and adductovarus hammertoes 4, 5 with decrease of medial longitudinal arch and also decrease in calcaneal inclination angle  - I believe that she has had likely having early onset ankle arthritis and also midfoot arthritis, it is possible that there is a fibrous coalition between the calcaneocuboid area  - We discussed all these options, most of them would begin with the same management which would be some form of bracing which in this case we will begin utilizing super feet orthotics and rigid shoes at all times and also anti-inflammatories  - I prescribed her a Medrol Dosepak for continued management of her inflammatory pain  - I discussed at length with her that due to her chronic foot deformity with the severe joint pain that she is having at that she age, she would benefit from use and application of custom orthotics, advised on sizing and dispensing  - She is to return in 1 month for further assessment of her pain, she is still in pain we will plan for referral for CT and also intra-articular steroid injection    Subjective:     Patient ID: Piero Lo is a 56 y o  female  Patient presents for evaluation management of her right foot pain  This is been going on for some time, she states that she did have a previous ankle fracture on that side but did not have surgical intervention, she has not gone to physical therapy in the past   She reports that she feels unstable on uneven ground and does feel as though this area is going to sprain  She is complaining of swelling on the lateral aspect of her right ankle and pain across the midfoot and also pain across the top of her right ankle as well  The following portions of the patient's history were reviewed and updated as appropriate: allergies, current medications, past family history, past medical history, past social history, past surgical history and problem list     Review of Systems   Constitutional: Negative for chills and fever  HENT: Negative for ear pain and sore throat  Eyes: Negative for pain and visual disturbance  Respiratory: Negative for cough and shortness of breath  Cardiovascular: Negative for chest pain and palpitations  Gastrointestinal: Negative for abdominal pain and vomiting  Genitourinary: Negative for dysuria and hematuria  Musculoskeletal: Negative for arthralgias and back pain  Skin: Negative for color change and rash  Neurological: Negative for seizures and syncope  All other systems reviewed and are negative  Objective:      Ht 5' 6" (1 676 m)   BMI 33 09 kg/m²         Physical Exam  Vitals reviewed  Constitutional:       Appearance: Normal appearance  HENT:      Head: Normocephalic and atraumatic  Nose: Nose normal       Mouth/Throat:      Mouth: Mucous membranes are moist    Eyes:      Pupils: Pupils are equal, round, and reactive to light  Cardiovascular:      Pulses: Normal pulses  Pulmonary:      Effort: Pulmonary effort is normal    Musculoskeletal:         General: Tenderness and deformity present  Comments: Pes planus deformity of the right foot pain with range of motion of the TMT J's especially the lateral TMT's 4, 5  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

## 2023-03-20 NOTE — PATIENT INSTRUCTIONS
Vionic Shoes: Comfortable Stylish Shoes, Sandals, Boots & More     [employer]  com: Na[place]et Green Professional-Grade High Arch Support Orthotic Shoe Inserts for Maximum Support [place], Green, 13 5-15 Men : Health & Household     What is metatarsus adductus    Metatarsus adductus, also known as metatarsus varus, is a common foot deformity noted at birth that causes the front half of the foot, or forefoot, to turn inward  Metatarsus adductus may also be referred to as "flexible" (the foot can be straightened to a degree by hand) or "nonflexible" (the foot cannot be straightened by hand)  What causes metatarsus adductus? The cause of metatarsus adductus is not known  It occurs in approximately 1 to 2 per 1,000 live births and is more common in first born children  Babies born with metatarsus adductus rarely need treatment as they grow  They may, however, be at increased risk for developmental dysplasia of the hip, a condition of the hip joint in which the top of the thigh (femur) slips in and out of its socket, because the socket is too shallow to keep the joint intact  How is metatarsus adductus diagnosed? A doctor makes the diagnosis of metatarsus adductus with a physical examination  During the examination, the doctor will obtain a complete birth history of the child and ask if other family members were known to have metatarsus adductus  Diagnostic procedures are not usually necessary to evaluate metatarsus adductus  However, X-rays (a diagnostic test that uses invisible electromagnetic energy beams to produce images of internal tissues, bones, and organs onto film) of the feet are often done in the case of nonflexible metatarsus adductus  An infant with metatarsus adductus has a high arch and the big toe has a wide separation from the second toe and deviates inward   Flexible metatarsus adductus is diagnosed if the heel and forefoot can be aligned with each other with gentle pressure on the forefoot while holding the heel steady  This technique is known as passive manipulation  If the forefoot is more difficult to align with the heel, it is considered a nonflexible, or stiff foot  Treatment for metatarsus adductus  Specific treatment for metatarsus adductus will be determined by your child's doctor based on: Your child's age, overall health, and medical history  The extent of the condition  Your child's tolerance for specific medications, procedures, or therapies  Expectations for the course of the condition  Your opinion or preference    The goal of treatment is to straighten the position of the forefoot and heel  Treatment options vary for infants, and may include:  Observation, for those with a supple, or flexible, forefoot  stretching or passive manipulation exercises  casts  surgery    Studies have shown that metatarsus adductus may resolve spontaneously (without treatment) in the majority of affected children  Your child's doctor or nurse may instruct you on how to perform passive manipulation exercises on your child's feet during diaper changes  A change in sleeping positions may also be recommended  Suggestions may include side-lying positioning  In rare instances, the foot does not respond to the stretching program, long leg casts may be applied  Casts are used to help stretch the soft tissues of the forefoot  The plaster casts are changed every 1 to 2 weeks by your child's pediatric orthopaedist   If the foot responds to casting, straight cast shoes may be prescribed to help hold the forefoot in place  Straight last shoes are made without a curve in the bottom of the shoe  For those infants with very rigid or severe metatarsus adductus, surgery may be required to release the forefoot joints  Following surgery, casts are applied to hold the forefoot in place as it heals      Long-term outlook for a child with metatarsus adductus  Metatarsus adductus is a common problem with more than 90% resolving on their own  When needed treatment will depend on the degree of flexibility in the affected foot

## 2023-04-24 ENCOUNTER — OFFICE VISIT (OUTPATIENT)
Dept: PODIATRY | Facility: CLINIC | Age: 48
End: 2023-04-24

## 2023-04-24 VITALS — HEIGHT: 66 IN | BODY MASS INDEX: 33.09 KG/M2

## 2023-04-24 DIAGNOSIS — M21.611 BUNION OF GREAT TOE OF RIGHT FOOT: ICD-10-CM

## 2023-04-24 DIAGNOSIS — M79.671 RIGHT FOOT PAIN: Primary | ICD-10-CM

## 2023-04-24 DIAGNOSIS — M19.279 OSTEOARTHRITIS OF MIDFOOT DUE TO INFLAMMATORY ARTHRITIS: ICD-10-CM

## 2023-04-24 DIAGNOSIS — M19.90 OSTEOARTHRITIS OF MIDFOOT DUE TO INFLAMMATORY ARTHRITIS: ICD-10-CM

## 2023-04-24 DIAGNOSIS — M19.079 ANKLE ARTHRITIS: ICD-10-CM

## 2023-04-24 NOTE — PROGRESS NOTES
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  -     CT lower extremity wo contrast right; Future  -     FL guided needle plac bx/asp/inj; Future  -     Cam Boot    Ankle arthritis  -     CT lower extremity wo contrast right; Future  -     Cam Boot    Bunion of great toe of right foot      -She failed oral anti-inflammatory management, at this point I believe that she is still having midfoot arthritis with possible's ankle arthritis versus guarding of the ankle  - I will send her for CT evaluation of the midfoot and ankle to rule out arthritic change and also for steroid injections of TMT J's 1 through 5 to the right foot  - We discussed the causation at length of this, her super feet orthotics are all the way, I advised shoes at all times in the house, she states that she does like to go barefoot, we discussed that her foot type was likely causing early onset arthritis of the midfoot   - Advised to purchase carbon fiber footplate, visit [email or web address] and obtain Vionic shoes for in-house usage  - She is to return to clinic for evaluation following her CT  -We discussed surgical stabilization of the midfoot with surgical intervention, but with bracing of her foot with carbon fiber footplate and rigid shoes I believe that she will likely be able to avoid this for years  Subjective:      Patient ID: Rosemarie Zuleta is a 56 y o  female  Patient presents for evaluation management of her right foot, she is having severe pain and she notes it is a band of warmth and heat across her midfoot  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  She presents today wearing Nike's and states that they are the most rigid shoes she have, they are bendable    States that the Medrol Dosepak did nothing for her "pain      The following portions of the patient's history were reviewed and updated as appropriate: allergies, current medications, past family history, past medical history, past social history, past surgical history and problem list     Review of Systems   Constitutional: Negative for chills and fever  HENT: Negative for ear pain and sore throat  Eyes: Negative for pain and visual disturbance  Respiratory: Negative for cough and shortness of breath  Cardiovascular: Negative for chest pain and palpitations  Gastrointestinal: Negative for abdominal pain and vomiting  Genitourinary: Negative for dysuria and hematuria  Musculoskeletal: Negative for arthralgias and back pain  Skin: Negative for color change and rash  Neurological: Negative for seizures and syncope  All other systems reviewed and are negative  Objective:      Ht 5' 6\" (1 676 m)   BMI 33 09 kg/m²          Physical Exam  Musculoskeletal:      Comments: There is significant warmth and swelling across the midfoot, there is no necessarily crepitus with range of motion of the ankle but there is some pressure and pain across the anterior aspect of the ankle, there is pain with range of motion of the first, second, third, TMT J's and minimal pain with range of motion of 4, 5, the pain is worse with range of motion of the third TMT J  There is palpable metatarsus adductus as well           "

## 2023-04-24 NOTE — PATIENT INSTRUCTIONS
Carbon fiber foot plate    [employer]ionic Shoes: Comfortable Stylish Shoes, Sandals, Boots & More     [facility] running store

## 2023-04-24 NOTE — LETTER
April 24, 2023     Patient: Coretta Núñez  YOB: 1975  Date of Visit: 4/24/2023      To Whom it May Concern:    Maite Quinn is under my professional care  Luis Brumfield was seen in my office on 4/24/2023  Jacquelynkrzysztof Brumfield is to continue to wear cam boot until she is re-evaluated by myself  If you have any questions or concerns, please don't hesitate to call           Sincerely,          Zabrina Santiago DPM        CC: Coretta Núñez

## 2023-04-26 DIAGNOSIS — G40.309 NONINTRACTABLE GENERALIZED IDIOPATHIC EPILEPSY WITHOUT STATUS EPILEPTICUS (HCC): ICD-10-CM

## 2023-04-26 RX ORDER — TOPIRAMATE 100 MG/1
TABLET, FILM COATED ORAL
Qty: 270 TABLET | Refills: 0 | Status: SHIPPED | OUTPATIENT
Start: 2023-04-26

## 2023-04-26 NOTE — TELEPHONE ENCOUNTER
REcd  4/26 3:32 PM, taken off 4/26 4:32 PM    Hi, this is Renetta duenas  Please have my doctor refill my prescription for topamax  my birthday [birth year]  If you can call me back at [phone]  Thank you very much  Bye bye     prescription already sent 4/26; called patient to advise; reached , mailbox is full and cannot accept messages at this time

## 2023-04-27 ENCOUNTER — TELEPHONE (OUTPATIENT)
Dept: OBGYN CLINIC | Facility: CLINIC | Age: 48
End: 2023-04-27

## 2023-05-15 ENCOUNTER — TELEPHONE (OUTPATIENT)
Dept: NEUROLOGY | Facility: CLINIC | Age: 48
End: 2023-05-15

## 2023-05-15 ENCOUNTER — HOSPITAL ENCOUNTER (OUTPATIENT)
Dept: RADIOLOGY | Facility: HOSPITAL | Age: 48
Discharge: HOME | End: 2023-05-15
Attending: PODIATRIST

## 2023-05-15 ENCOUNTER — TELEPHONE (OUTPATIENT)
Dept: PODIATRY | Facility: CLINIC | Age: 48
End: 2023-05-15

## 2023-05-15 DIAGNOSIS — M19.279 OSTEOARTHRITIS OF MIDFOOT DUE TO INFLAMMATORY ARTHRITIS: Primary | ICD-10-CM

## 2023-05-15 DIAGNOSIS — M19.079 ANKLE ARTHRITIS: ICD-10-CM

## 2023-05-15 DIAGNOSIS — M19.90 OSTEOARTHRITIS OF MIDFOOT DUE TO INFLAMMATORY ARTHRITIS: Primary | ICD-10-CM

## 2023-05-15 DIAGNOSIS — M19.90 OSTEOARTHRITIS OF MIDFOOT DUE TO INFLAMMATORY ARTHRITIS: ICD-10-CM

## 2023-05-15 DIAGNOSIS — M19.279 OSTEOARTHRITIS OF MIDFOOT DUE TO INFLAMMATORY ARTHRITIS: ICD-10-CM

## 2023-05-15 RX ORDER — LIDOCAINE HYDROCHLORIDE 10 MG/ML
15 INJECTION, SOLUTION EPIDURAL; INFILTRATION; INTRACAUDAL; PERINEURAL
Status: COMPLETED | OUTPATIENT
Start: 2023-05-15 | End: 2023-05-15

## 2023-05-15 RX ORDER — BUPIVACAINE HYDROCHLORIDE 2.5 MG/ML
10 INJECTION, SOLUTION EPIDURAL; INFILTRATION; INTRACAUDAL
Status: COMPLETED | OUTPATIENT
Start: 2023-05-15 | End: 2023-05-15

## 2023-05-15 RX ORDER — METHYLPREDNISOLONE ACETATE 80 MG/ML
160 INJECTION, SUSPENSION INTRA-ARTICULAR; INTRALESIONAL; INTRAMUSCULAR; SOFT TISSUE
Status: COMPLETED | OUTPATIENT
Start: 2023-05-15 | End: 2023-05-15

## 2023-05-15 RX ADMIN — IOHEXOL 2 ML: 300 INJECTION, SOLUTION INTRAVENOUS at 14:16

## 2023-05-15 RX ADMIN — METHYLPREDNISOLONE ACETATE 120 MG: 80 INJECTION, SUSPENSION INTRA-ARTICULAR; INTRALESIONAL; INTRAMUSCULAR; SOFT TISSUE at 14:18

## 2023-05-15 RX ADMIN — LIDOCAINE HYDROCHLORIDE 12 ML: 10 INJECTION, SOLUTION EPIDURAL; INFILTRATION; INTRACAUDAL; PERINEURAL at 14:18

## 2023-05-15 RX ADMIN — BUPIVACAINE HYDROCHLORIDE 1.5 ML: 2.5 INJECTION, SOLUTION EPIDURAL; INFILTRATION; INTRACAUDAL; PERINEURAL at 14:18

## 2023-05-15 NOTE — TELEPHONE ENCOUNTER
I called and left a voicemail message reminding the patient of there upcoming appointment with Maura Bergman PA-C on 5/22/23 @ 3:30pm in the [facility] office  I requested a call back to our office to confirm the appointment or if the patient has any issues or concerns or cannot keep this appointment

## 2023-05-22 ENCOUNTER — OFFICE VISIT (OUTPATIENT)
Dept: NEUROLOGY | Facility: CLINIC | Age: 48
End: 2023-05-22

## 2023-05-22 VITALS
DIASTOLIC BLOOD PRESSURE: 64 MMHG | BODY MASS INDEX: 33.27 KG/M2 | WEIGHT: 207 LBS | SYSTOLIC BLOOD PRESSURE: 118 MMHG | HEIGHT: 66 IN | HEART RATE: 101 BPM | RESPIRATION RATE: 18 BRPM | TEMPERATURE: 97.9 F | OXYGEN SATURATION: 96 %

## 2023-05-22 DIAGNOSIS — G40.309 NONINTRACTABLE GENERALIZED IDIOPATHIC EPILEPSY WITHOUT STATUS EPILEPTICUS (HCC): ICD-10-CM

## 2023-05-22 DIAGNOSIS — G43.109 MIGRAINE WITH AURA AND WITHOUT STATUS MIGRAINOSUS, NOT INTRACTABLE: Primary | ICD-10-CM

## 2023-05-22 DIAGNOSIS — F51.04 PSYCHOPHYSIOLOGICAL INSOMNIA: ICD-10-CM

## 2023-05-22 RX ORDER — TOPIRAMATE 100 MG/1
TABLET, FILM COATED ORAL
Qty: 270 TABLET | Refills: 3 | Status: SHIPPED | OUTPATIENT
Start: 2023-05-22

## 2023-05-22 NOTE — PATIENT INSTRUCTIONS
Continue current medications  Follow up with Dr Devyn Rosario at the end of August or sooner if needed

## 2023-05-22 NOTE — PROGRESS NOTES
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  Her epilepsy is complicated by medication side effects with topiramate causing paresthesia and cognitive impairments  Reduction in her topiramate dose has helped reduce her cognitive complaints  She was also found to have B12 deficiency and was started on a B12 supplement  Her migraines are currently well controlled currently  She did have an episode of vertigo associated with HA back in February which occurred following treatment for a sinus infection  She was prescribed short course of oral steroid which cleared up her symptoms  Potentially inner ear related (peripheral vertigo) vs related to migraine  No recurrence  Plan:   Epilepsy:  1 - continue topiramate 150mg (one and a half tab 100mg tab) twice a day  2 - call the office if any recurrent seizure      Migraines:  1 - may continue to use Treximet at onset of migraine  No more than 2 doses in 24hrs  2 - advised staying hydrated with 64oz of water daily, not skipping meals, and getting adequate sleep  3 - keep a calendar of your migraine headache frequency and treatment  This may inform us if migraines worsen with reduction of topiramate  Anxiety:  1 - can continue to take sertraline 50mg take 2 5 tabs (125mg) daily for anxiety and sleep (this was previously being prescribed by Dr Shireen Camarillo who she was seeing before transfer to the epilepsy center)  2 - may take one clonazepam 0 5mg tab as needed for anxiety attack  B12 deficiency:  1 - continue OTC B12 1000mcg daily     She will return in 4 months or sooner if needed  "Diagnoses and all orders for this visit:    Migraine with aura and without status migrainosus, not intractable    Nonintractable generalized 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 ([address] )  -     topiramate (TOPAMAX) 100 mg tablet; Take 1 5 tablets BID    Psychophysiological insomnia  -     sertraline (ZOLOFT) 50 mg tablet; Take 2 5 tablets (125 mg total) by mouth daily           Subjective:    KAN Cruz is a 56 y o  right handed female here for follow-up evaluation of epilepsy, migraines, and paresthesia  Interval history 5/22/2023:  Patient reports she is doing well from a neurologic standpoint  She denies any seizures, headaches are currently well controlled  In February she called the office reporting an episode of vertigo  This occurred after being treated with antibiotics for a sinus infection  After the episode of vertigo she continued to feel \"off\" and had a headache  I sent in dexamethasone for her and this cleared up her symptoms  She has not had a recurrence  She is currently dealing with an issue in the right foot, following with podiatry  AED/side effects/compliance:  topiramate 150-150  Paresthesia, cognitive impairment (worse at higher doses, improved with dose reduction)     Event/Seizure semiology:  1  Absence seizures  2  Possible generalized tonic clonic seizure     Intake History 6/11/2021  She was diagnosed with absence seizures when she was 10years old, she was put on Depakote, there were no further seizures  She noticed that she was losing her hair on Depakote  She was off medication since she was 16years old  She was off AED for many years until she was 30years old  When she was 30years old, six months after she had her son, she sent a an unusual text message to her  with random numbers  He called her back to find out what happened  She does not remember what happened  She noticed that she had showered but she does not recall showering   She " "recalled biting her tongue  It was suspected that she had a grand mal seizure  She saw Dr Juan Vasquez at [facility] around 2003/2004  Probably due to her migraine headaches, she was put on topiramate to manage both conditions  She was planning a pregnant at 28years old, transitioned from topiramate to lamotrigine  She unfortunately had a seizure  She lost this pregnancy due to genetic disorder  Then 4 months later she was pregnant with twins; she had stayed on lamotrigine and was constantly checking her lamotrigine levels  After she delivered her twins, she went right back on topiramate  When she is on topiramate, she struggles with word finding difficulty (I feel stupid)  She does not recall if lamotrigine was causing significant side effects  Every since 2006, she has been on topiramate  She has always been on topiramate 200mg twice a day; she does not recall ever trying a lower dose of medication  One night she woke up in a panic, like the world was going to end, she had difficulty thinking and putting her thoughts together, she was not able to calm down her nerves or thoughts, mind was racing like crazy, panic feeling lasted 5 minutes, she was worried that a seizure was going to happen  She was fairly coherent, but did not progress to altered awareness  She has had anxiety attacks in the past, she gets aura of panic, which then can result in a migraine headaches  She started to have migraines around middle school  She gets migraines that can last a week, just before her menses or weather changes  Headache is so severe she has to go into a hole, severely nauseated  Head pressure sensation over the front of head or back of the neck  She will try Advil 2 tabs to take the edge off and Coke or coffee  When she was pregnant she did not have migraines  Topiramate may help with migraines  She does not get a lot of migraines (maybe 5-6 in the last 6 months)   If her migraines are severe she has \"major\" " medication, compound medication that cost $400  (Looking at the list of medication dichloralphenazone is no longer available in the [place], which contains chloral hydrate )  She had tried Maxalt but it did not work  It may have been sumatriptan  She may have used dexamethasone to abort headaches  She is currently on sumatriptan  Prior records from [facility]:  Last visit 1/23/2020 - she was prescribed Midrin (isometheptene-acetaminophen-dichloralphenazone) and Treximet for migraines  (she was having 5-10 days/month with headache, required steroids, under a lot of stress )  She has restless legs, previously prescribed Quinine but she just drinks tonic water now  Last seizure was in 2006; possible aura of panic, whole body warmth sensation  Visit on 12/5/2018 - she was having 1-2 migraines/month     Interval history 4/27/2022:  Patient was last seen 10 months ago  At timing of last visit, decision was made to reduce topiramate in order to alleviate side effects (she c/o cognitive dysfunction, paresthesia)  Plan was to reduce from 200mg BID to 150mg BID  She tells me today she has been taking topiramate 150mg AM and 200mg PM, says she did not know the plan was to reduce to 150mg BID  She is still having brain fog, cognitive dysfunction  She has trouble with word finding  No issues with ADLs, driving etc  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, 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  She denies any seizures since her last visit  Migraines have been “Stable”  she uses Treximet at onset of migraine  She is requesting Zofran, has previously had Rx for this, but not recently  She needs refills of all of her medications  She was previously seen by Dr Salome Masters who had been refilling her Zoloft and she needs a refill of that  She also needs refill of clonazepam, which she has been prescribed for anxiety to use PRN       Interval history 2022:  Patient reports she is overall doing well since last visit  Her topiramate was reduced to 150mg BID  She also had labs done which showed low B12 and she started taking a B12 supplement  She feels she is doing better from a cognitive standpoint with these changes  She no longer has significant issues searching for words  Her headaches were doing well but increased in the last few weeks  She recently changed birth control pills to a lower dose pill and feels that may be causing increased headaches  She just completed her first month of the new medication  She reports she is struggling to lose weight  She is not sure she is eating the right things or not  She has been doing some research on gut health and hormones  Interval history 2022:  Patient reports she is doing well from a neurologic standpoint  She denies any seizures, headaches are currently well controlled  She is now following with medical weight loss center and working on diet and exercise, has lost some weight over the past few months  She does note her mother passed away in October, which was very tough on her  She took a leave from work but will return next week       Woman of childbearing age with Epilepsy:  Contraception: Seasonale  Folic acid supplement: No     Prior Epilepsy History:  x     Special Features  Status epilepticus: No  Self Injury Seizures: No  Precipitating Factors: None  Post-ictal state: confusion     Epilepsy Risk Factors:  Abnormal pregnancy: No  Abnormal birth/: No  Abnormal Development: No  Febrile seizures, simple: No  Febrile seizures, complex: No  CNS infection: No  Mental retardation: No  Cerebral palsy: No  Head injury (moderate/severe): No  CNS neoplasm: No  CNS malformation: No  Neurosurgical procedure: No  Stroke: No  CNS autoimmune disorder: No  Alcohol abuse: No  Drug abuse: No  Family history Sz/epilepsy: her son (absence and grand mal seizures) and and one of her twin daughters (grand "mal seizure at 3years old); brother with febrile seizure; one brother with seizure     Prior AEDs:  medication Max dose Time used Reason to stop   divalpreox     Hair loss   lamotrigine         topiramate         Tegretol (as a child)            PRIOR Migraine trials  Abortives - Frova (side effects), Maxalt and Zomig (no help), advil, Zofran, tylenol, flexerile     Prior workup:  x  [id number] - MRI brain  4-5 tiny foci of T2/FLAIR hyperintensity scattered in the subcortical white matter of both hemispheres  8/10/2015 - MRI brain [facility])  Right cerebellar tonsil protrudes through the foramen magnum unchanged from MRI 2010     EEGs:  None available       The following portions of the patient's history were reviewed and updated as appropriate: current medications, past family history, past medical history, past social history, past surgical history and problem list        Objective:    Blood pressure 118/64, pulse 101, temperature 97 9 °F (36 6 °C), temperature source Temporal, resp  rate 18, height 5' 6\" (1 676 m), weight 93 9 kg (207 lb), SpO2 96 %    Physical Exam  Constitutional:       Appearance: Normal appearance  HENT:      Head: Normocephalic and atraumatic  Eyes:      Extraocular Movements: EOM normal       Pupils: Pupils are equal, round, and reactive to light  Neurological:      Mental Status: She is alert  Motor: Motor strength is normal       Deep Tendon Reflexes: Reflexes are normal and symmetric  Psychiatric:         Mood and Affect: Mood normal          Speech: Speech normal          Behavior: Behavior normal          Neurological Exam  Mental Status  Alert  Oriented to person, place, time and situation  Speech is normal  Language is fluent with no aphasia  Attention and concentration are normal     Cranial Nerves  CN II: Visual fields full to confrontation  CN III, IV, VI: Extraocular movements intact bilaterally   Pupils equal round and reactive to light " bilaterally  CN V: Facial sensation is normal   CN VII: Full and symmetric facial movement  CN VIII: Hearing is normal   CN IX, X: Palate elevates symmetrically  CN XI: Shoulder shrug strength is normal   CN XII: Tongue midline without atrophy or fasciculations  Motor   Normal muscle tone  Strength is 5/5 throughout all four extremities  Sensory  Light touch is normal in upper and lower extremities  Reflexes  Deep tendon reflexes are 2+ and symmetric in all four extremities  Coordination  Right: Finger-to-nose normal Left: Finger-to-nose normal     Gait  Casual gait is normal including stance, stride, and arm swing  Current Outpatient Medications   Medication Sig Dispense Refill   • clonazePAM (KlonoPIN) 0 5 mg tablet Take one tab by mouth for severe panic attack  5 tablet 0   • Cyanocobalamin (VITAMIN B-12 PO) Take by mouth     • Junel  1-20 MG-MCG per tablet      • ondansetron (Zofran ODT) 4 mg disintegrating tablet Take 1 tablet (4 mg total) by mouth every 6 (six) hours as needed for nausea or vomiting 20 tablet 0   • Probiotic Product (PROBIOTIC DAILY PO) Take by mouth     • sertraline (ZOLOFT) 50 mg tablet Take 2 5 tablets (125 mg total) by mouth daily 225 tablet 3   • SUMAtriptan-naproxen (TREXIMET)  MG per tablet Take 1 po at onset of migraine, may repeat x 1 in 24hrs 10 tablet 1   • topiramate (TOPAMAX) 100 mg tablet Take 1 5 tablets  tablet 3   • dexamethasone (DECADRON) 2 mg tablet Take 1 po daily with food x 3 days (Patient not taking: Reported on 2023) 3 tablet 0     No current facility-administered medications for this visit  Allergies   Allergen Reactions   • Tetracycline Rash     Other reaction(s):  Other (See Comments)  throat closes      Past Medical History:   Diagnosis Date   • A-fib ([facility] [address] )    • Interstitial cystitis    • Migraines    • Seizures ([address] )       Past Surgical History:   Procedure Laterality Date   •  SECTION     • FL GUIDED NEEDLE PLAC BX/ASP/INJ  5/15/2023   • UPPER GASTROINTESTINAL ENDOSCOPY        Family History   Problem Relation Age of Onset   • Lung cancer Mother    • Breast cancer Mother    • Stroke Mother    • Hyperlipidemia Mother    • Atrial fibrillation Mother    • Hyperlipidemia Father    • Macular degeneration Father    • Multiple sclerosis Sister    • Seizures Brother    • Atrial fibrillation Maternal Aunt    • Heart disease Maternal Grandmother    • Heart disease Maternal Grandfather    • Seizures Paternal Grandmother    • Lung cancer Paternal Grandfather    • Seizures Daughter    • Seizures Son    • Diabetes Neg Hx    • Thyroid disease Neg Hx       Past Psychiatric History:  Depression: yes  Anxiety: yes  Psychosis: No     Social History  Living situation:  Lives with family  Work:    Driving:  Yes   reports that she has never smoked  She has never used smokeless tobacco  She reports current alcohol use  She reports that she does not use drugs  ROS:    Review of Systems   Constitutional: Positive for appetite change  Negative for chills and fever  HENT: Negative for ear pain and sore throat  Eyes: Negative for pain and visual disturbance  Respiratory: Negative for cough and shortness of breath  Cardiovascular: Negative for chest pain and palpitations  Gastrointestinal: Negative for abdominal pain and vomiting  Genitourinary: Positive for frequency and urgency  Negative for dysuria and hematuria  Musculoskeletal: Negative for arthralgias and back pain  Skin: Negative for color change and rash  Neurological: Positive for dizziness and light-headedness  Negative for seizures and syncope  All other systems reviewed and are negative      I personally reviewed and updated the ROS as appropriate

## 2023-05-23 ENCOUNTER — TELEPHONE (OUTPATIENT)
Dept: PODIATRY | Facility: CLINIC | Age: 48
End: 2023-05-23

## 2023-05-23 NOTE — TELEPHONE ENCOUNTER
Caller: [facility]    Doctor: Suresh Love    Reason for call: Had procedure on her joint in toe last Monday - messaged questions in portal and did not get a call or answer - pleae advise    Also had CT scan    Call back#: [phone]

## 2023-07-19 ENCOUNTER — TELEPHONE (OUTPATIENT)
Dept: NEUROLOGY | Facility: CLINIC | Age: 48
End: 2023-07-19

## 2023-07-19 NOTE — TELEPHONE ENCOUNTER
Called and spoke to patient. Patient confirmed upcoming apt with Dr Kaylie Zamora on 8/4/23 @ 8 am.   Appt card mailed out with physician's name, location, date and time of appt.
no

## 2023-07-21 DIAGNOSIS — G43.109 MIGRAINE WITH AURA AND WITHOUT STATUS MIGRAINOSUS, NOT INTRACTABLE: ICD-10-CM

## 2023-07-21 RX ORDER — DEXAMETHASONE 2 MG/1
TABLET ORAL
Qty: 5 TABLET | Refills: 0 | Status: SHIPPED | OUTPATIENT
Start: 2023-07-21

## 2023-07-22 ENCOUNTER — TELEPHONE (OUTPATIENT)
Dept: NEUROLOGY | Facility: CLINIC | Age: 48
End: 2023-07-22

## 2023-07-22 NOTE — TELEPHONE ENCOUNTER
Received VM: 7-21-23   2:07  Taken off 7-21-23   9:02    Pt called with a migraine for 3 days and asked for steroids to break it like she has had in the past. This was already taken care of by Delvis Barbosa through [address] [place] [address].

## 2023-07-26 ENCOUNTER — RA CDI HCC (OUTPATIENT)
Dept: OTHER | Facility: HOSPITAL | Age: 48
End: 2023-07-26

## 2023-07-26 NOTE — PROGRESS NOTES
F32.A Depression- found in active problem list - Can Depression be further classified using more specific code     [address] coding opportunities          Chart Reviewed number of suggestions sent to Provider: 1     Patients Insurance        Commercial Insurance: Samir Gonzalez

## 2023-08-04 ENCOUNTER — OFFICE VISIT (OUTPATIENT)
Dept: NEUROLOGY | Facility: CLINIC | Age: 48
End: 2023-08-04
Payer: COMMERCIAL

## 2023-08-04 VITALS
HEIGHT: 66 IN | RESPIRATION RATE: 18 BRPM | OXYGEN SATURATION: 99 % | DIASTOLIC BLOOD PRESSURE: 59 MMHG | HEART RATE: 100 BPM | WEIGHT: 215.4 LBS | TEMPERATURE: 97.7 F | BODY MASS INDEX: 34.62 KG/M2 | SYSTOLIC BLOOD PRESSURE: 124 MMHG

## 2023-08-04 DIAGNOSIS — G43.109 MIGRAINE WITH AURA AND WITHOUT STATUS MIGRAINOSUS, NOT INTRACTABLE: ICD-10-CM

## 2023-08-04 DIAGNOSIS — G93.5 CHIARI I MALFORMATION (HCC): ICD-10-CM

## 2023-08-04 DIAGNOSIS — F41.9 ANXIETY: ICD-10-CM

## 2023-08-04 DIAGNOSIS — G40.309 NONINTRACTABLE GENERALIZED IDIOPATHIC EPILEPSY WITHOUT STATUS EPILEPTICUS (HCC): Primary | ICD-10-CM

## 2023-08-04 PROCEDURE — 99215 OFFICE O/P EST HI 40 MIN: CPT | Performed by: PSYCHIATRY & NEUROLOGY

## 2023-08-04 RX ORDER — LANOLIN ALCOHOL/MO/W.PET/CERES
400 CREAM (GRAM) TOPICAL DAILY
Qty: 30 TABLET | Refills: 5 | Status: SHIPPED | OUTPATIENT
Start: 2023-08-04

## 2023-08-04 RX ORDER — SUMATRIPTAN AND NAPROXEN SODIUM 85; 500 MG/1; MG/1
TABLET, FILM COATED ORAL
Qty: 10 TABLET | Refills: 1 | Status: SHIPPED | OUTPATIENT
Start: 2023-08-04

## 2023-08-04 RX ORDER — CLONAZEPAM 0.5 MG/1
TABLET ORAL
Qty: 5 TABLET | Refills: 0 | Status: SHIPPED | OUTPATIENT
Start: 2023-08-04

## 2023-08-04 RX ORDER — RIBOFLAVIN (VITAMIN B2) 400 MG
400 TABLET ORAL DAILY
Qty: 30 TABLET | Refills: 5 | Status: SHIPPED | OUTPATIENT
Start: 2023-08-04

## 2023-08-04 NOTE — PATIENT INSTRUCTIONS
Plan:   1 - continue with topiramate 100mg take one and a half tab twice a day  Consider reducing topiramate to 100mg in the morning and 150mg in the evening to alleviate cognitive side effects; low likelihood of having a breakthrough seizure but no guarantee that a seizure would not occur. 2 - will recommend checking topiramate level and BMP  3 - recommend adding riboflavin 400mg daily and magnesium oxide 400mg daily to help lessen headache frequency (if magnesium oxide causes excessive abdominal cramps and diarrhea consider getting magnesium glycinate at the supplements section)  4 - avoid using excessive amount of ibuprofen (no more than 2 days a week, as long term use of ibuprofen can result in migraine overuse headaches). 5 - MRI brain wo contrast to reassess the location of the Chiari malformation  6 - you may take one clonazepam 0.5mg tab as needed for anxiety attack.   7 - follow-up 6 months with [facility]  8 - continue with sertraline 125mg daily for generalized anxiety

## 2023-08-04 NOTE — PROGRESS NOTES
[facility]  Patient's Name: Yenni Leo   Patient's : 1975   Visit Type: follow-up  Referring MD / PCP:  Nitin Ramirez DO    Assessment:  Ms. Yenni Leo is a 53 y.o. woman who likely has an underlying genetic generalized epilepsy (she has multiple family members with epilepsy, she reported having had absence type of seizures as child and a possible recurrence of her epilepsy when she was 30years old (after years of being off of an antiseizure medication). She has presumed risk for recurrent seizures based on having had a possible seizure during adulthood and family history of epilepsy. She has episodic migraine headaches with a recent status migrainosus that required steroid to break the migraine. She is on a relatively high dose of topiramate, presumably escalated for her migraine headaches as she has not had recurrent seizures for years. Topiramate is associated with cognitive impairment and during this visit we discussed the option of reducing her dose of topiramate to alleviate her symptoms. She is concerned about causing a breakthrough seizure, which I believe is relatively low but not zero. Lowering the dose of topiramate may worsening migraine frequency which may require an alternative prophylactic medication. She was not in favor of introducing another medication at this time. For now, she will continue with her current dose of topiramate but may call back later about dose reduction. She will try riboflavin/magnesium supplements for migraine prevention. I reviewed her prior MRI brain studies that back in 2019, there is a report of low lying cerebellar tonsils consistent with Chiari malformation. The degree of its location is not likely causative of her migraines/headaches (and she does not have chronic daily headaches that would be associated with a Chiari malformation).   An MRI brain study is indicated given her recent severe headache to reassess if there are new structural lesions to cause headaches. Plan:   1 - continue with topiramate 100mg take one and a half tab twice a day  Consider reducing topiramate to 100mg in the morning and 150mg in the evening to alleviate cognitive side effects; low likelihood of having a breakthrough seizure but no guarantee that a seizure would not occur. 2 - will recommend checking topiramate level and BMP  3 - recommend adding riboflavin 400mg daily and magnesium oxide 400mg daily to help lessen headache frequency (if magnesium oxide causes excessive abdominal cramps and diarrhea consider getting magnesium glycinate at the supplements section)  4 - avoid using excessive amount of ibuprofen (no more than 2 days a week, as long term use of ibuprofen can result in migraine overuse headaches). 5 - MRI brain wo contrast to reassess the location of the Chiari malformation    6 - you may take one clonazepam 0.5mg tab as needed for anxiety attack.   7 - follow-up 6 months with [facility]  8 - continue with sertraline 125mg daily for generalized anxiety    Problem List Items Addressed This Visit        Cardiovascular and Mediastinum    Migraine with aura and without status migrainosus, not intractable    Relevant Medications    SUMAtriptan-naproxen (TREXIMET)  MG per tablet    magnesium Oxide (MAG-OX) 400 mg TABS    Riboflavin 400 MG TABS    clonazePAM (KlonoPIN) 0.5 mg tablet    Other Relevant Orders    MRI brain without contrast (Completed)       Nervous and Auditory    Nonintractable generalized 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 ([address]) - Primary    Relevant Medications    clonazePAM (KlonoPIN) 0.5 mg tablet    Other Relevant Orders    Topiramate level    Basic metabolic panel    Chiari I malformation (HCC)       Other    Anxiety    Relevant Medications    clonazePAM (KlonoPIN) 0.5 mg tablet       Chief Complaint:   Chief Complaint   Patient presents with   • Migraine   • Seizures   • Memory Loss      HPI: Emiliano Gant is a 53 y.o. right handed female here for follow-up evaluation of epilepsy and migraines. Interval History 8/4/2023  Since her initial consultation with me in 2021, she has had follow-up with Linda Roca for the past couple of years. Most recently on 5/22/2023 - no recurrent seizures, headaches were under control. Her topiramate dose was reduced due to paresthesias. She reports that there have been no seizures. The main issue has been her migraines. She sometimes associate her migraines with her menstrual period or with bad weather. She usually gets a migraine a week prior to her period. Since May 2023, she has not had a really bad migraines. However, she may have headaches that happens a couple of times a month, which she will take ibuprofen with Coca-cola, which can last 2-3 days. These headaches are different from her migraines, because she has some nausea with limited painful area over her head. Her migraines are holocephalic and very debilitating to the point that she could not function. She had a 5 day migraine headache a week ago, which sumatriptan-naproxen did not abort the headache, she tried ibuprofen, coca-cola, even a migraine ice pack that goes over her head. She did take the five days of dexamethasone 2mg daily which helped to abort the migraine. She literally drinks water all day. She continues that her cognition is impaired, with word finding difficulty and recall short term information. She was trying to remember something that happened recently. 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 Occasionally, she will have a bit of dizziness or abnormal lightheaded sensation, but it comes on for a while even if she is seated. She has been on Zoloft since she had her son who is now a grown adult.   It was started by her family doctor. She feels that her anxiety is stable, but when she tried to get off of sertraline, but it triggered a constant sensation of being anxious. She has not     AED/side effects/compliance:  Topiramate 150-150  Cognitive difficulties (recalling names)    Event/Seizure semiology:  1. Absence seizures  2. Possible generalized tonic clonic seizure    Woman of childbearing age with Epilepsy:  Contraception: oral contraceptive medication  Folic acid supplement:  No    Prior Epilepsy History:  Intake History 6/11/2021  She was diagnosed with absence seizures when she was 10years old, she was put on Depakote, there were no further seizures. She noticed that she was losing her hair on Depakote. She was off medication since she was 16years old. She was off AED for many years until she was 30years old. When she was 30years old, six months after she had her son, she sent a an unusual text message to her  with random numbers. He called her back to find out what happened. She does not remember what happened. She noticed that she had showered but she does not recall showering. She recalled biting her tongue. It was suspected that she had a grand mal seizure. She saw Dr. Catarina Coffey at [facility] staring around 2003/2004. Probably due to her migraine headaches, she was put on topiramate to manage both conditions. She was planning a  pregnant at 28years old, transitioned from topiramate to lamotrigine. She unfortunately had a seizure. She lost this pregnancy due to genetic disorder. Then 4 months later she was pregnant with twins; she had stayed on lamotrigine and was constantly checking her lamotrigine levels. After she delivered her twins, she went right back on topiramate. When she is on topiramate, she struggles with word finding difficulty (I feel stupid). She does not recall if lamotrigine was causing significant side effects. Every since 2006, she has been on topiramate. She has always been on topiramate 200mg twice a day; she does not recall ever trying a lower dose of medication. One night she woke up in a panic, like the world was going to end, she had difficulty thinking and putting her thoughts together, she was not able to calm down her nerves or thoughts, mind was racing like crazy, panic feeling lasted 5 minutes, she was worried that a seizure was going to happen. She was fairly coherent, but did not progress to altered awareness. She has had anxiety attacks in the past, she gets aura of panic, which then can result in a migraine headaches. She started to have migraines around middle school. She gets migraines that can last a week, just before her menses or weather changes. Headache is so severe she has to go into a hole, severely nauseated. Head pressure sensation over the front of head or back of the neck. She will try Advil 2 tabs to take the edge off and Coke or coffee. When she was pregnant she did not have migraines. Topiramate may help with migraines. She does not get a lot of migraines (maybe 5-6 in the last 6 months). If her migraines are severe she has "major" medication, compound medication that cost $400. (Looking at the list of medication dichloralphenazone is no longer available in the [place], which contains chloral hydrate.)  She had tried Maxalt but it did not work. It may have been sumatriptan. She may have used dexamethasone to abort headaches. She is currently on sumatriptan. [facility] from [facility]:  Last visit 1/23/2020 - she was prescribed Midrin (isometheptene-acetaminophen-dichloralphenazone) and Treximet for migraines. (she was having 5-10 days/month with headache, required steroids, under a lot of stress.)  She has restless legs, previously prescribed Quinine but she just drinks tonic water now.   Last seizure was in 2006; possible aura of panic, whole body warmth sensation  Visit on 12/5/2018 - she was having 1-2 migraines/month      Special Features  Status epilepticus: No  Self Injury Seizures: No  Precipitating Factors: None  Post-ictal state: confusion    Epilepsy Risk Factors:  Abnormal pregnancy: No  Abnormal birth/: No  Abnormal Development: No  Febrile seizures, simple: No  Febrile seizures, complex: No  CNS infection: No  Mental retardation: No  Cerebral palsy: No  Head injury (moderate/severe): No  CNS neoplasm: No  CNS malformation: No  Neurosurgical procedure: No  Stroke: No  CNS autoimmune disorder: No  Alcohol abuse: No  Drug abuse: No  Family history Sz/epilepsy: her son (absence and grand mal seizures) and and one of her twin daughters (grand mal seizure at 3years old); brother with febrile seizure; one brother with seizure    Prior AEDs:  medication Time used Reason to stop   divalpreox  Hair loss   lamotrigine     topiramate     Tegretol (as a child)       PRIOR Migraine trials  Abortives - Frova (side effects), Maxalt and Zomig (no help), advil, Zofran, tylenol, flexerile    Prior workup:  x  [id number] - MRI brain  4-5 tiny foci of T2/FLAIR hyperintensity scattered in the subcortical white matter of both hemispheres.     8/10/2015 - MRI brain [facility])  Right cerebellar tonsil protrudes through the foramen magnum unchanged from MRI 2010    EEGs:  None available    Labs:  Component      Latest Ref Rng 12/3/2022   WBC      4.31 - 10.16 Thousand/uL 6.95    Red Blood Cell Count      3.81 - 5.12 Million/uL 4.32    Hemoglobin      11.5 - 15.4 g/dL 13.3    HCT      34.8 - 46.1 % 41.3    Platelet Count      257 - 390 Thousands/uL 327    Sodium      135 - 147 mmol/L 139    Potassium      3.5 - 5.3 mmol/L 4.1    Chloride      96 - 108 mmol/L 115 (H)    CO2      21 - 32 mmol/L 21    Anion Gap      4 - 13 mmol/L 3 (L)    BUN      5 - 25 mg/dL 19    Creatinine      0.60 - 1.30 mg/dL 0.68    GLUCOSE FASTING      65 - 99 mg/dL 96    Calcium      8.3 - 10.1 mg/dL 9.0    CORRECTED CALCIUM      8.3 - 10.1 mg/dL 10.0    AST      5 - 45 U/L 14    ALT      12 - 78 U/L 25    Alkaline Phosphatase      46 - 116 U/L 177 (H)    Total Protein      6.4 - 8.4 g/dL 7.5    Albumin      3.5 - 5.0 g/dL 2.8 (L)    TOTAL BILIRUBIN      0.20 - 1.00 mg/dL 0.13 (L)    eGFR      ml/min/1.73sq m 105    Vitamin B-12      100 - 900 pg/mL 570    TSH 3RD GENERATON      0.450 - 4.500 uIU/mL 1.760      General exam   /59 (BP Location: Right arm, Patient Position: Sitting, Cuff Size: Standard)   Pulse 100   Temp 97.7 °F (36.5 °C) (Tympanic)   Resp 18   Ht 5' 6" (1.676 m)   Wt 97.7 kg (215 lb 6.4 oz)   SpO2 99%   BMI 34.77 kg/m²    Appearance: normally developed, appears well  Carotids: not assessed  Cardiovascular: regular rate and rhythm and normal heart sounds  Pulmonary: clear to auscultation  Abdominal: obese, nontender, nondistended  Extremities: no edema    HEENT: anicteric and moist mucus membranes / oral cavity   Fundoscopy: bilateral optic discs are sharp    Mental status  Orientation: alert and oriented to name, place, time  Fund of Knowledge: intact   Attention and Concentration: CHASE LIMON (oh, goodness) OMAILAURE  Current and Remote Memory:recalled 3/3 words after five minutes  Language: spontaneous speech is normal and comprehension is intact    Cranial Nerves  CN 1: not tested  CN 2: pupils equal round reactive to direct and consenual light   CN 3, 4, 6: EOMI, no nystagmus  CN 5:not assessed  CN 7:muscles of facial expression are symmetric  CN 8:not assessed  CN 9, 10:no dysarthria present  CN 11:symmetric SCM with head turns  CN 12:tongue is midline    Motor:  Bulk, Tone: normal bulk, normal tone  Pronation: normal barrel roll  Strength: Symmetric strength of the arms and legs, no lateralizing weakness   Abnormal movements: no abnormal movements are present    Sensory:  Lighttouch: intact in all limbs  Romberg:normal    Coordination:  FNF:FNF bilaterally intact  DANIS:intact  FFM:intact  Gait/Station:normal gait and normal tandem gait    Reflexes:  Reflexes were hypoactive (0+/4) symmetric, tested bilateral biceps, triceps, brachiradialis, patellar, except 1+/4 at the ankles    Past Medical/Surgical History:  Patient Active Problem List   Diagnosis   • Migraine with aura and without status migrainosus, not intractable   • Laryngopharyngeal reflux (LPR)   • Antineutrophil cytoplasmic antibody (ANCA) positive   • Psychophysiological insomnia   • Nonintractable generalized 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 ([address])   • Anxiety   • Non morbid obesity due to excess calories   • Depression   • Candida esophagitis (HCC)   • Chiari I malformation (HCC)   • Pelvic pain in female   • Muscle spasm   • IC (interstitial cystitis)   • BOR syndrome   • Obesity, Class I, BMI 30-34.9   • Ankle arthritis   • Right foot pain   • Bunion of great toe of right foot   • Flat foot   • 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 , Disp: 5 tablet, Rfl: 0  •  Cyanocobalamin (VITAMIN B-12 PO), Take by mouth, Disp: , Rfl:   •  dexamethasone (DECADRON) 2 mg tablet, Take 1 po daily with food x 3 days, Disp: 5 tablet, Rfl: 0  •  Junel  1-20 MG-MCG per tablet, , Disp: , Rfl:   •  magnesium Oxide (MAG-OX) 400 mg TABS, Take 1 tablet (400 mg total) by mouth daily, Disp: 30 tablet, Rfl: 5  •  ondansetron (Zofran ODT) 4 mg disintegrating tablet, Take 1 tablet (4 mg total) by mouth every 6 (six) hours as needed for nausea or vomiting, Disp: 20 tablet, Rfl: 0  •  Riboflavin 400 MG TABS, Take 1 tablet (400 mg total) by mouth in the morning, Disp: 30 tablet, Rfl: 5  •  sertraline (ZOLOFT) 50 mg tablet, Take 2.5 tablets (125 mg total) by mouth daily, Disp: 225 tablet, Rfl: 3  •  SUMAtriptan-naproxen (TREXIMET)  MG per tablet, Take 1 po at onset of migraine, may repeat x 1 in 24hrs, Disp: 10 tablet, Rfl: 1  •  topiramate (TOPAMAX) 100 mg tablet, Take 1.5 tablets BID, Disp: 270 tablet, Rfl: 3  •  Probiotic Product (PROBIOTIC DAILY PO), Take by mouth (Patient not taking: Reported on 8/4/2023), Disp: , Rfl:     Allergies: Allergies   Allergen Reactions   • Tetracycline Rash     Other reaction(s): Other (See Comments)  throat closes       Family history:  Family History   Problem Relation Age of Onset   • Lung cancer Mother    • Breast cancer Mother    • Stroke Mother    • Hyperlipidemia Mother    • Atrial fibrillation Mother    • Hyperlipidemia Father    • Macular degeneration Father    • Myasthenia gravis Father    • Multiple sclerosis Sister    • Seizures Brother    • Atrial fibrillation Maternal Aunt    • Heart disease Maternal Grandmother    • Heart disease Maternal Grandfather    • Seizures Paternal Grandmother    • Lung cancer Paternal Grandfather    • Seizures Daughter    • Seizures Son    • Diabetes Neg Hx    • Thyroid disease Neg Hx        Social History  Living situation:  Lives with family  Work:  , Bachelors degree  Driving:  Yes   reports that she has never smoked. She has never used smokeless tobacco. She reports current alcohol use. She reports that she does not use drugs. Review of Systems  A review of at least 12 organ/systems was obtained by the medical assistant and reviewed by me, including additional positives/negatives:  Constitutional: Positive for fatigue. Neurological: Positive for dizziness (at times) and light-headedness (at times).  Negative for tremors, seizures, syncope, facial asymmetry, speech difficulty, weakness, numbness and headaches (migraine's-lasted for 5 days). Psychiatric/Behavioral: Negative for confusion, hallucinations and sleep disturbance. Word finding       The total amount of time spent with the patient along with pre-chart and post-chart preparation was 53 minutes on the calendar day of the date of service. This included history taking, physical exam, review of ancillary testing, counseling provided to the patient regarding diagnosis, medications, treatment, and risk management, and other communication to the patient's providers and/or family. Start time: 8:20AM  End time: 9:13AM    The [place] was queried. No red flags were identified. The patient is low risk for abuse of the prescribed clonazepam medication. Safe to proceed with prescription.

## 2023-08-04 NOTE — PROGRESS NOTES
Review of Systems   Constitutional: Positive for fatigue. Negative for appetite change and fever. HENT: Negative. Negative for hearing loss, tinnitus, trouble swallowing and voice change. Eyes: Negative. Negative for photophobia, pain and visual disturbance. Respiratory: Negative. Negative for shortness of breath. Cardiovascular: Negative. Negative for palpitations. Gastrointestinal: Negative. Negative for nausea and vomiting. Endocrine: Negative. Negative for cold intolerance. Genitourinary: Negative. Negative for dysuria, frequency and urgency. Musculoskeletal: Negative for back pain, gait problem, myalgias and neck pain. Skin: Negative. Negative for rash. Allergic/Immunologic: Negative. Neurological: Positive for dizziness (at times) and light-headedness (at times). Negative for tremors, seizures, syncope, facial asymmetry, speech difficulty, weakness, numbness and headaches (migraine's-lasted for 5 days). Hematological: Negative. Does not bruise/bleed easily. Psychiatric/Behavioral: Negative for confusion, hallucinations and sleep disturbance.         Word finding

## 2023-08-14 ENCOUNTER — HOSPITAL ENCOUNTER (OUTPATIENT)
Facility: MEDICAL CENTER | Age: 48
Discharge: HOME | End: 2023-08-14

## 2023-08-14 DIAGNOSIS — G43.109 MIGRAINE WITH AURA AND WITHOUT STATUS MIGRAINOSUS, NOT INTRACTABLE: ICD-10-CM

## 2023-08-20 ENCOUNTER — HOSPITAL ENCOUNTER (OUTPATIENT)
Dept: MRI IMAGING | Facility: HOSPITAL | Age: 48
Discharge: HOME | End: 2023-08-20
Attending: PSYCHIATRY & NEUROLOGY
Payer: COMMERCIAL

## 2023-08-20 PROCEDURE — G1004 CDSM NDSC: HCPCS

## 2023-08-20 PROCEDURE — 70551 MRI BRAIN STEM W/O DYE: CPT

## 2023-09-15 ENCOUNTER — RA CDI HCC (OUTPATIENT)
Dept: OTHER | Facility: HOSPITAL | Age: 48
End: 2023-09-15

## 2023-09-28 ENCOUNTER — OFFICE VISIT (OUTPATIENT)
Dept: FAMILY MEDICINE CLINIC | Facility: CLINIC | Age: 48
End: 2023-09-28
Payer: COMMERCIAL

## 2023-09-28 VITALS
BODY MASS INDEX: 34.68 KG/M2 | HEART RATE: 88 BPM | HEIGHT: 66 IN | TEMPERATURE: 96.6 F | WEIGHT: 215.8 LBS | OXYGEN SATURATION: 99 % | SYSTOLIC BLOOD PRESSURE: 108 MMHG | DIASTOLIC BLOOD PRESSURE: 70 MMHG

## 2023-09-28 DIAGNOSIS — R20.0 NUMBNESS AND TINGLING: Primary | ICD-10-CM

## 2023-09-28 DIAGNOSIS — R63.5 WEIGHT GAIN: ICD-10-CM

## 2023-09-28 DIAGNOSIS — R20.2 NUMBNESS AND TINGLING: Primary | ICD-10-CM

## 2023-09-28 DIAGNOSIS — Q87.89 BOR SYNDROME: ICD-10-CM

## 2023-09-28 DIAGNOSIS — Z11.1 SCREENING-PULMONARY TB: ICD-10-CM

## 2023-09-28 DIAGNOSIS — F32.0 MAJOR DEPRESSIVE DISORDER, SINGLE EPISODE, MILD (HCC): ICD-10-CM

## 2023-09-28 DIAGNOSIS — E66.9 OBESITY, CLASS I, BMI 30-34.9: ICD-10-CM

## 2023-09-28 PROCEDURE — 99204 OFFICE O/P NEW MOD 45 MIN: CPT | Performed by: FAMILY MEDICINE

## 2023-09-28 NOTE — PROGRESS NOTES
Family Medicine Follow-Up Office Visit  Sudeep Covarrubias 53 y.o. female   MRN: [id number] : 1975  ENCOUNTER: 2023 10:40 AM    Assessment and Plan   Numbness and tingling  Patient notes numbness and tingling in the feet with first foot step in the morning. He notes that this self resolves within a few minutes and does not persist throughout the day. New problem with uncertain etiology at this time. Plan:  CBC, CMP, A1c, lipid panel, B12, TSH for further evaluation    Screening-pulmonary TB  Screening chest x-ray is ordered due to history of prior positive PPD for employment related physical    Follow-up in 3 to 6 months for annual physical.  Is completed and scanned into the chart for employment related physical.     Chief Complaint     Chief Complaint   Patient presents with   • Establish Care       History of Present Illness   Sudeep Covarrubias is a 53y.o.-year-old female with PMHx of migraines, Chiari 1 malformation, BOR syndrome, epilepsy, anxiety/MDD who presents today to establish care due to dissatisfaction with her prior primary care provider. She notes that she frequently suffers with migraines for which she follows with neurology, Dr. Wanda Ahumada and associates. She notes that she has recently had sensations of numbness and tingling without pain in the base of the bilateral feet when she steps onto them in the morning. She wonders if this is due to weight gain within the past year due to multiple psychosocial stressors. She denies any tobacco, alcohol, drug use. She notes that this has been going on for a couple of weeks. She lives in the area with her  and her 17.7 year old twins. She also has a 21year old son. 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  Chest Xray is ordered for TB screening due to history of prior positive PPD for which she did receive prophylactic treatment. She has had no history of symptoms related to this nor positive history with prior chest x-ray. Review of Systems   Review of Systems   Constitutional: Negative for fatigue and fever. HENT: Negative for congestion and sore throat. Respiratory: Negative for cough and shortness of breath. Cardiovascular: Negative for chest pain and palpitations. Gastrointestinal: Negative for abdominal pain, blood in stool, constipation, diarrhea, nausea and vomiting. Genitourinary: Negative for dysuria and hematuria. Musculoskeletal: Negative for arthralgias and myalgias. Skin: Negative for rash. Neurological: Negative for dizziness and headaches. Psychiatric/Behavioral: Negative for dysphoric mood. The patient is not nervous/anxious. Active Problem List     Patient Active Problem List   Diagnosis   • Numbness and tingling   • Migraine with aura and without status migrainosus, not intractable   • Laryngopharyngeal reflux (LPR)   • Antineutrophil cytoplasmic antibody (ANCA) positive   • Psychophysiological insomnia   • Nonintractable generalized 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 ([address])   • Anxiety   • Non morbid obesity due to excess calories   • Depression   • Candida esophagitis (HCC)   • Chiari I malformation (HCC)   • Pelvic pain in female   • Muscle spasm   • IC (interstitial cystitis)   • BOR syndrome   • Obesity, Class I, BMI 30-34.9   • Ankle arthritis   • Right foot pain   • Bunion of great toe of right foot   • Flat foot   • 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     Objective   /70 (BP Location: Left arm, Patient Position: Sitting, Cuff Size: Large)   Pulse 88   Temp (!) 96.6 °F (35.9 °C) (Tympanic)   Ht 5' 6" (1.676 m)   Wt 97.9 kg (215 lb 12.8 oz)   SpO2 99%   BMI 34.83 kg/m²     Physical Exam  Constitutional:       General: She is not in acute distress. Appearance: She is well-developed. She is obese. She is not ill-appearing. HENT:      Head: Normocephalic and atraumatic. Right Ear: Tympanic membrane, ear canal and external ear normal.      Left Ear: Tympanic membrane, ear canal and external ear normal.      Ears:      Comments: Conversationally hearing is intact     Mouth/Throat:      Mouth: Mucous membranes are moist.      Pharynx: Oropharynx is clear. Eyes:      General: No scleral icterus. Right eye: No discharge. Left eye: No discharge. Conjunctiva/sclera: Conjunctivae normal.      Pupils: Pupils are equal, round, and reactive to light. Comments: Vision exam intact bilaterally   Cardiovascular:      Rate and Rhythm: Normal rate and regular rhythm. Heart sounds: Normal heart sounds. No murmur heard. No friction rub. No gallop. Pulmonary:      Effort: Pulmonary effort is normal. No respiratory distress. Breath sounds: Normal breath sounds. No wheezing, rhonchi or rales. Abdominal:      General: Bowel sounds are normal. There is no distension. Palpations: Abdomen is soft. Tenderness: There is no abdominal tenderness. Musculoskeletal:      Right lower leg: No edema. Left lower leg: No edema. Skin:     General: Skin is warm and dry. Neurological:      General: No focal deficit present. Mental Status: She is alert and oriented to person, place, and time.    Psychiatric:         Mood and Affect: Mood normal.         Behavior: Behavior normal.           Pertinent Laboratory/Diagnostic Studies:  Lab Results   Component Value Date    BUN 19 12/03/2022    CREATININE 0.68 12/03/2022    CALCIUM 9.0 12/03/2022    K 4.1 12/03/2022    CO2 21 12/03/2022     (H) 12/03/2022     Lab Results   Component Value Date    ALT 25 12/03/2022    AST 14 12/03/2022    ALKPHOS 177 (H) 12/03/2022       Lab Results   Component Value Date    WBC 6.95 12/03/2022    HGB 13.3 12/03/2022    HCT 41.3 12/03/2022    MCV 96 12/03/2022     12/03/2022       No results found for: "TSH"    No results found for: "CHOL"  Lab Results   Component Value Date    TRIG 83 12/03/2022     Lab Results   Component Value Date    HDL 74 12/03/2022     Lab Results   Component Value Date    LDLCALC 136 (H) 12/03/2022     Lab Results   Component Value Date    HGBA1C 4.9 12/03/2022       Results for orders placed or performed in visit on 12/03/22   CBC and differential   Result Value Ref Range    WBC 6.95 4.31 - 10.16 Thousand/uL    RBC 4.32 3.81 - 5.12 Million/uL    Hemoglobin 13.3 11.5 - 15.4 g/dL    Hematocrit 41.3 34.8 - 46.1 %    MCV 96 82 - 98 fL    MCH 30.8 26.8 - 34.3 pg    MCHC 32.2 31.4 - 37.4 g/dL    RDW 12.3 11.6 - 15.1 %    MPV 9.5 8.9 - 12.7 fL    Platelets 565 681 - 798 Thousands/uL    nRBC 0 /100 WBCs    Neutrophils Relative 49 43 - 75 %    Immat GRANS % 0 0 - 2 %    Lymphocytes Relative 45 (H) 14 - 44 %    Monocytes Relative 5 4 - 12 %    Eosinophils Relative 1 0 - 6 %    Basophils Relative 0 0 - 1 %    Neutrophils Absolute 3.39 1.85 - 7.62 Thousands/µL    Immature Grans Absolute 0.02 0.00 - 0.20 Thousand/uL    Lymphocytes Absolute 3.10 0.60 - 4.47 Thousands/µL    Monocytes Absolute 0.37 0.17 - 1.22 Thousand/µL    Eosinophils Absolute 0.05 0.00 - 0.61 Thousand/µL    Basophils Absolute 0.02 0.00 - 0.10 Thousands/µL   Vitamin B12   Result Value Ref Range    Vitamin B-12 570 100 - 900 pg/mL   TSH, 3rd generation with Free T4 reflex   Result Value Ref Range    TSH 3RD GENERATON 1.760 0.450 - 4.500 uIU/mL   Comprehensive metabolic panel   Result Value Ref Range    Sodium 139 135 - 147 mmol/L    Potassium 4.1 3.5 - 5.3 mmol/L    Chloride 115 (H) 96 - 108 mmol/L    CO2 21 21 - 32 mmol/L    ANION GAP 3 (L) 4 - 13 mmol/L    BUN 19 5 - 25 mg/dL    Creatinine 0.68 0.60 - 1.30 mg/dL    Glucose, Fasting 96 65 - 99 mg/dL    Calcium 9.0 8.3 - 10.1 mg/dL    Corrected Calcium 10.0 8.3 - 10.1 mg/dL    AST 14 5 - 45 U/L    ALT 25 12 - 78 U/L    Alkaline Phosphatase 177 (H) 46 - 116 U/L    Total Protein 7.5 6.4 - 8.4 g/dL    Albumin 2.8 (L) 3.5 - 5.0 g/dL    Total Bilirubin 0.13 (L) 0.20 - 1.00 mg/dL    eGFR 105 ml/min/1.73sq m   Lipid Panel with Direct LDL reflex   Result Value Ref Range    Cholesterol 227 (H) See Comment mg/dL    Triglycerides 83 See Comment mg/dL    HDL, Direct 74 >=50 mg/dL    LDL Calculated 136 (H) 0 - 100 mg/dL   HEMOGLOBIN A1C W/ EAG ESTIMATION   Result Value Ref Range    Hemoglobin A1C 4.9 Normal 3.8-5.6%; PreDiabetic 5.7-6.4%;  Diabetic >=6.5%; Glycemic control for adults with diabetes <7.0% %    EAG 94 mg/dl   Insulin, fasting   Result Value Ref Range    Insulin, Fasting 10.7 3.0 - 25.0 mU/L       Orders Placed This Encounter   Procedures   • XR chest pa & lateral   • CBC and differential   • Comprehensive metabolic panel   • Lipid panel   • HEMOGLOBIN A1C W/ EAG ESTIMATION   • Vitamin B12   • TSH, 3rd generation with Free T4 reflex         Current Medications     Current Outpatient Medications   Medication Sig Dispense Refill   • Cyanocobalamin (VITAMIN B-12 PO) Take by mouth     • dexamethasone (DECADRON) 2 mg tablet Take 1 po daily with food x 3 days 5 tablet 0   • Junel 1/20 1-20 MG-MCG per tablet      • magnesium Oxide (MAG-OX) 400 mg TABS Take 1 tablet (400 mg total) by mouth daily 30 tablet 5   • ondansetron (Zofran ODT) 4 mg disintegrating tablet Take 1 tablet (4 mg total) by mouth every 6 (six) hours as needed for nausea or vomiting 20 tablet 0   • Riboflavin 400 MG TABS Take 1 tablet (400 mg total) by mouth in the morning 30 tablet 5   • sertraline (ZOLOFT) 50 mg tablet Take 2.5 tablets (125 mg total) by mouth daily 225 tablet 3   • SUMAtriptan-naproxen (TREXIMET)  MG per tablet Take 1 po at onset of migraine, may repeat x 1 in 24hrs 10 tablet 1   • topiramate (TOPAMAX) 100 mg tablet Take 1.5 tablets  tablet 3     No current facility-administered medications for this visit. ALLERGIES:  Allergies   Allergen Reactions   • Tetracycline Rash     Other reaction(s): Other (See Comments)  throat closes       Health Maintenance     Health Maintenance   Topic Date Due   • Depression Remission PHQ  Never done   • Annual Physical  Never done   • Cervical Cancer Screening  Never done   • Breast Cancer Screening: Mammogram  Never done   • COVID-19 Vaccine (3 - Moderna series) 05/05/2021   • Influenza Vaccine (1) 09/01/2023   • BMI: Followup Plan  12/16/2023   • HIV Screening  08/19/2024 (Originally 7/21/1990)   • BMI: Adult  09/28/2024   • Colorectal Cancer Screening  02/25/2026   • DTaP,Tdap,and Td Vaccines (2 - Td or Tdap) 03/05/2028   • Hepatitis C Screening  Completed   • Pneumococcal Vaccine: Pediatrics (0 to 5 Years) and At-Risk Patients (6 to 59 Years)  Aged Out   • HIB Vaccine  Aged Out   • IPV Vaccine  Aged Out   • Hepatitis A Vaccine  Aged Out   • Meningococcal ACWY Vaccine  Aged Out   • HPV Vaccine  Aged [employer] History   Administered Date(s) Administered   • COVID-19 MODERNA VACC 0.5 ML IM 02/10/2021, 03/10/2021   • INFLUENZA 11/21/2007, 03/29/2013, 10/10/2013, 03/06/2014, 10/01/2016, 11/07/2021   • Influenza Quadrivalent Preservative Free 3 years and older IM 09/30/2015, 10/11/2017, 11/14/2018, 09/17/2019   • Influenza Quadrivalent Recombinant Preservative Free IM 10/12/2020   • Influenza, seasonal, injectable 11/07/2021, 10/23/2022   • Tdap 03/05/2018         Jose Turner DO   [address]  9/29/2023  10:40 AM    Parts of this note were dictated using [email or web address] dictation software and may have sounds-like errors due to variation in pronunciation.

## 2023-09-29 PROBLEM — Z11.1 SCREENING-PULMONARY TB: Status: ACTIVE | Noted: 2023-09-29

## 2023-09-29 PROBLEM — R20.2 NUMBNESS AND TINGLING: Status: ACTIVE | Noted: 2019-09-12

## 2023-09-29 NOTE — ASSESSMENT & PLAN NOTE
Patient notes numbness and tingling in the feet with first foot step in the morning. He notes that this self resolves within a few minutes and does not persist throughout the day. New problem with uncertain etiology at this time.     Plan:  · CBC, CMP, A1c, lipid panel, B12, TSH for further evaluation

## 2023-09-29 NOTE — ASSESSMENT & PLAN NOTE
Screening chest x-ray is ordered due to history of prior positive PPD for employment related physical

## 2023-09-30 ENCOUNTER — APPOINTMENT (OUTPATIENT)
Dept: LAB | Facility: MEDICAL CENTER | Age: 48
End: 2023-09-30
Payer: COMMERCIAL

## 2023-09-30 ENCOUNTER — APPOINTMENT (OUTPATIENT)
Dept: RADIOLOGY | Facility: MEDICAL CENTER | Age: 48
End: 2023-09-30
Payer: COMMERCIAL

## 2023-09-30 DIAGNOSIS — R20.0 NUMBNESS AND TINGLING: ICD-10-CM

## 2023-09-30 DIAGNOSIS — R63.5 WEIGHT GAIN: ICD-10-CM

## 2023-09-30 DIAGNOSIS — E66.9 OBESITY, CLASS I, BMI 30-34.9: ICD-10-CM

## 2023-09-30 DIAGNOSIS — R20.2 NUMBNESS AND TINGLING: ICD-10-CM

## 2023-09-30 DIAGNOSIS — Z11.1 SCREENING-PULMONARY TB: ICD-10-CM

## 2023-09-30 DIAGNOSIS — G40.309 NONINTRACTABLE GENERALIZED IDIOPATHIC EPILEPSY WITHOUT STATUS EPILEPTICUS (HCC): ICD-10-CM

## 2023-09-30 LAB
ALBUMIN SERPL BCP-MCNC: 3.8 G/DL (ref 3.5–5)
ALP SERPL-CCNC: 135 U/L (ref 34–104)
ALT SERPL W P-5'-P-CCNC: 16 U/L (ref 7–52)
ANION GAP SERPL CALCULATED.3IONS-SCNC: 9 MMOL/L
AST SERPL W P-5'-P-CCNC: 19 U/L (ref 13–39)
BASOPHILS # BLD AUTO: 0.05 THOUSANDS/ÂΜL (ref 0–0.1)
BASOPHILS NFR BLD AUTO: 1 % (ref 0–1)
BILIRUB SERPL-MCNC: 0.34 MG/DL (ref 0.2–1)
BUN SERPL-MCNC: 13 MG/DL (ref 5–25)
CALCIUM SERPL-MCNC: 9.3 MG/DL (ref 8.4–10.2)
CHLORIDE SERPL-SCNC: 108 MMOL/L (ref 96–108)
CHOLEST SERPL-MCNC: 211 MG/DL
CO2 SERPL-SCNC: 22 MMOL/L (ref 21–32)
CREAT SERPL-MCNC: 0.67 MG/DL (ref 0.6–1.3)
EOSINOPHIL # BLD AUTO: 0.04 THOUSAND/ÂΜL (ref 0–0.61)
EOSINOPHIL NFR BLD AUTO: 1 % (ref 0–6)
ERYTHROCYTE [DISTWIDTH] IN BLOOD BY AUTOMATED COUNT: 12.7 % (ref 11.6–15.1)
EST. AVERAGE GLUCOSE BLD GHB EST-MCNC: 108 MG/DL
GFR SERPL CREATININE-BSD FRML MDRD: 105 ML/MIN/1.73SQ M
GLUCOSE P FAST SERPL-MCNC: 80 MG/DL (ref 65–99)
HBA1C MFR BLD: 5.4 %
HCT VFR BLD AUTO: 42.4 % (ref 34.8–46.1)
HDLC SERPL-MCNC: 79 MG/DL
HGB BLD-MCNC: 13.7 G/DL (ref 11.5–15.4)
IMM GRANULOCYTES # BLD AUTO: 0.02 THOUSAND/UL (ref 0–0.2)
IMM GRANULOCYTES NFR BLD AUTO: 0 % (ref 0–2)
LDLC SERPL CALC-MCNC: 108 MG/DL (ref 0–100)
LYMPHOCYTES # BLD AUTO: 3.39 THOUSANDS/ÂΜL (ref 0.6–4.47)
LYMPHOCYTES NFR BLD AUTO: 41 % (ref 14–44)
MCH RBC QN AUTO: 30.6 PG (ref 26.8–34.3)
MCHC RBC AUTO-ENTMCNC: 32.3 G/DL (ref 31.4–37.4)
MCV RBC AUTO: 95 FL (ref 82–98)
MONOCYTES # BLD AUTO: 0.5 THOUSAND/ÂΜL (ref 0.17–1.22)
MONOCYTES NFR BLD AUTO: 6 % (ref 4–12)
NEUTROPHILS # BLD AUTO: 4.19 THOUSANDS/ÂΜL (ref 1.85–7.62)
NEUTS SEG NFR BLD AUTO: 51 % (ref 43–75)
NONHDLC SERPL-MCNC: 132 MG/DL
NRBC BLD AUTO-RTO: 0 /100 WBCS
PLATELET # BLD AUTO: 361 THOUSANDS/UL (ref 149–390)
PMV BLD AUTO: 10 FL (ref 8.9–12.7)
POTASSIUM SERPL-SCNC: 4 MMOL/L (ref 3.5–5.3)
PROT SERPL-MCNC: 7.7 G/DL (ref 6.4–8.4)
RBC # BLD AUTO: 4.48 MILLION/UL (ref 3.81–5.12)
SODIUM SERPL-SCNC: 139 MMOL/L (ref 135–147)
TRIGL SERPL-MCNC: 119 MG/DL
TSH SERPL DL<=0.05 MIU/L-ACNC: 0.97 UIU/ML (ref 0.45–4.5)
VIT B12 SERPL-MCNC: 670 PG/ML (ref 180–914)
WBC # BLD AUTO: 8.19 THOUSAND/UL (ref 4.31–10.16)

## 2023-09-30 PROCEDURE — 80061 LIPID PANEL: CPT

## 2023-09-30 PROCEDURE — 82607 VITAMIN B-12: CPT

## 2023-09-30 PROCEDURE — 83036 HEMOGLOBIN GLYCOSYLATED A1C: CPT

## 2023-09-30 PROCEDURE — 71046 X-RAY EXAM CHEST 2 VIEWS: CPT

## 2023-09-30 PROCEDURE — 36415 COLL VENOUS BLD VENIPUNCTURE: CPT

## 2023-09-30 PROCEDURE — 80201 ASSAY OF TOPIRAMATE: CPT

## 2023-09-30 PROCEDURE — 80053 COMPREHEN METABOLIC PANEL: CPT

## 2023-09-30 PROCEDURE — 84443 ASSAY THYROID STIM HORMONE: CPT

## 2023-09-30 PROCEDURE — 85025 COMPLETE CBC W/AUTO DIFF WBC: CPT

## 2023-10-02 ENCOUNTER — PATIENT MESSAGE (OUTPATIENT)
Dept: FAMILY MEDICINE CLINIC | Facility: CLINIC | Age: 48
End: 2023-10-02

## 2023-10-02 DIAGNOSIS — R74.8 ELEVATED ALKALINE PHOSPHATASE LEVEL: Primary | ICD-10-CM

## 2023-10-03 LAB — TOPIRAMATE SERPL-MCNC: 10.5 UG/ML (ref 2–25)

## 2023-10-05 ENCOUNTER — TELEPHONE (OUTPATIENT)
Dept: ADMINISTRATIVE | Facility: OTHER | Age: 48
End: 2023-10-05

## 2023-10-05 NOTE — TELEPHONE ENCOUNTER
Upon review of the In Basket request we were able to locate, review, and update the patient chart as requested for Mammogram and Pap Smear (HPV) aka Cervical Cancer Screening. Any additional questions or concerns should be emailed to the Practice Liaisons via the appropriate education email address, please do not reply via In Basket.     Thank you  Ashleigh Miller MA

## 2023-10-05 NOTE — TELEPHONE ENCOUNTER
----- Message -----   From: Ranulfo Aguirre   Sent: 10/5/2023  11:45 AM EDT   To: Care Gap [facility]   Subject: care gap request cervical cancer screening       10/05/23 11:45 AM     Hello, our patient attached above has had Pap Smear (HPV) aka Cervical Cancer Screening completed/performed. Please assist in updating the patient chart by pulling the Care Everywhere (CE) document. The date of service is 04/03/2023.      Thank you,   Ranulfo MCINTOSH [facility] [address]

## 2023-10-05 NOTE — TELEPHONE ENCOUNTER
----- Message from Jimmy Hooker sent at 10/5/2023 11:45 AM EDT -----  Regarding: care gap request mammogram  10/05/23 11:45 AM    Hello, our patient attached above has had Mammogram completed/performed. Please assist in updating the patient chart by pulling the Care Everywhere (CE) document. The date of service is 10/06/2022.      Thank you,  Jimmy [facility]

## 2023-10-06 ENCOUNTER — TELEPHONE (OUTPATIENT)
Dept: NEUROLOGY | Facility: CLINIC | Age: 48
End: 2023-10-06

## 2023-10-06 NOTE — TELEPHONE ENCOUNTER
Reached out to pt in regards to making an APPT with  if interesred. Dr. Aline Haines will only see her for migraines not seizures.      LVM

## 2023-10-20 ENCOUNTER — TELEPHONE (OUTPATIENT)
Dept: NEUROLOGY | Facility: CLINIC | Age: 48
End: 2023-10-20

## 2023-10-20 DIAGNOSIS — G43.109 MIGRAINE WITH AURA AND WITHOUT STATUS MIGRAINOSUS, NOT INTRACTABLE: ICD-10-CM

## 2023-10-20 NOTE — TELEPHONE ENCOUNTER
Patient called wants steroid to break the cycle of her migraines. What she is taking is not helping.  Please advise

## 2023-10-23 RX ORDER — DEXAMETHASONE 2 MG/1
TABLET ORAL
Qty: 5 TABLET | Refills: 0 | Status: SHIPPED | OUTPATIENT
Start: 2023-10-23

## 2023-11-28 PROBLEM — Z11.1 SCREENING-PULMONARY TB: Status: RESOLVED | Noted: 2023-09-29 | Resolved: 2023-11-28

## 2023-12-18 ENCOUNTER — HOSPITAL ENCOUNTER (OUTPATIENT)
Dept: ULTRASOUND IMAGING | Facility: HOSPITAL | Age: 48
Discharge: HOME | End: 2023-12-18
Attending: FAMILY MEDICINE
Payer: COMMERCIAL

## 2023-12-18 ENCOUNTER — APPOINTMENT (OUTPATIENT)
Dept: LAB | Facility: MEDICAL CENTER | Age: 48
End: 2023-12-18
Payer: COMMERCIAL

## 2023-12-18 ENCOUNTER — OFFICE VISIT (OUTPATIENT)
Dept: FAMILY MEDICINE CLINIC | Facility: CLINIC | Age: 48
End: 2023-12-18
Payer: COMMERCIAL

## 2023-12-18 VITALS
DIASTOLIC BLOOD PRESSURE: 56 MMHG | TEMPERATURE: 97.7 F | HEIGHT: 65 IN | WEIGHT: 212 LBS | BODY MASS INDEX: 35.32 KG/M2 | HEART RATE: 89 BPM | OXYGEN SATURATION: 98 % | SYSTOLIC BLOOD PRESSURE: 121 MMHG

## 2023-12-18 DIAGNOSIS — R10.11 RUQ PAIN: Primary | ICD-10-CM

## 2023-12-18 DIAGNOSIS — R10.11 RUQ PAIN: ICD-10-CM

## 2023-12-18 DIAGNOSIS — F32.0 MAJOR DEPRESSIVE DISORDER, SINGLE EPISODE, MILD (HCC): ICD-10-CM

## 2023-12-18 LAB
BASOPHILS # BLD AUTO: 0.04 THOUSANDS/ÂΜL (ref 0–0.1)
BASOPHILS NFR BLD AUTO: 1 % (ref 0–1)
EOSINOPHIL # BLD AUTO: 0.04 THOUSAND/ÂΜL (ref 0–0.61)
EOSINOPHIL NFR BLD AUTO: 1 % (ref 0–6)
ERYTHROCYTE [DISTWIDTH] IN BLOOD BY AUTOMATED COUNT: 12.6 % (ref 11.6–15.1)
HCT VFR BLD AUTO: 39.4 % (ref 34.8–46.1)
HGB BLD-MCNC: 12.6 G/DL (ref 11.5–15.4)
IMM GRANULOCYTES # BLD AUTO: 0.01 THOUSAND/UL (ref 0–0.2)
IMM GRANULOCYTES NFR BLD AUTO: 0 % (ref 0–2)
LYMPHOCYTES # BLD AUTO: 3.96 THOUSANDS/ÂΜL (ref 0.6–4.47)
LYMPHOCYTES NFR BLD AUTO: 46 % (ref 14–44)
MCH RBC QN AUTO: 30.4 PG (ref 26.8–34.3)
MCHC RBC AUTO-ENTMCNC: 32 G/DL (ref 31.4–37.4)
MCV RBC AUTO: 95 FL (ref 82–98)
MONOCYTES # BLD AUTO: 0.47 THOUSAND/ÂΜL (ref 0.17–1.22)
MONOCYTES NFR BLD AUTO: 6 % (ref 4–12)
NEUTROPHILS # BLD AUTO: 4.09 THOUSANDS/ÂΜL (ref 1.85–7.62)
NEUTS SEG NFR BLD AUTO: 46 % (ref 43–75)
NRBC BLD AUTO-RTO: 0 /100 WBCS
PLATELET # BLD AUTO: 293 THOUSANDS/UL (ref 149–390)
PMV BLD AUTO: 9.8 FL (ref 8.9–12.7)
RBC # BLD AUTO: 4.14 MILLION/UL (ref 3.81–5.12)
WBC # BLD AUTO: 8.61 THOUSAND/UL (ref 4.31–10.16)

## 2023-12-18 PROCEDURE — 85025 COMPLETE CBC W/AUTO DIFF WBC: CPT

## 2023-12-18 PROCEDURE — 83690 ASSAY OF LIPASE: CPT

## 2023-12-18 PROCEDURE — 36415 COLL VENOUS BLD VENIPUNCTURE: CPT

## 2023-12-18 PROCEDURE — 99214 OFFICE O/P EST MOD 30 MIN: CPT | Performed by: FAMILY MEDICINE

## 2023-12-18 PROCEDURE — 80053 COMPREHEN METABOLIC PANEL: CPT

## 2023-12-18 PROCEDURE — 76705 ECHO EXAM OF ABDOMEN: CPT

## 2023-12-18 NOTE — PROGRESS NOTES
Family Medicine Follow-Up Office Visit  Renetta Rendon 48 y.o. female   MRN: [id number] : 1975  ENCOUNTER: 2024 9:34 PM    Assessment and Plan   RUQ pain  Patient with 1 week of worsening RUQ abdominal pain.     Vital signs stable and physical exam is currently benign    Ddx includes cholelithiasis, GERD, gastric ulcer, pancreatitis.    Plan:  Initiate further workup including CBC, CMP, and lipase  RUQ US recommended to assess for gallbladder pathology  ED precautions are discussed  Recommend follow up in 1-2 weeks or sooner as needed for persistent or worsening symptoms.       Chief Complaint     Chief Complaint   Patient presents with   • Abdominal Pain   • Nausea       History of Present Illness   Renetta Rendon is a 48 y.o.-year-old female with PMHx of LPR, interstitial cystitis, and obesity who presents today for evaluation of abdominal pain.     Patient notes that she started 1 week ago with abdominal pain exacerbated with eating. She notes associated nausea with this throughout the day. She notes that she has been having a bland diet with rice, applesauce, water and tea and notes that this pain persists. She notes that the pain is in the periumbilical region with associated RUQ and R shoulder pain. She describes this as a sharp pain that is waxing and waning. She denies associated vomiting. She denies constipation and notes a few episodes of diarrhea. She does note associated fatigue. Notes that her symptoms have been progressively worsening.     Answers submitted by the patient for this visit:  Abdominal Pain Questionnaire (Submitted on 2023)  Chief Complaint: Abdominal pain  Chronicity: new  Onset: in the past 7 days  Onset quality: gradual  Frequency: constantly  Progression since onset: waxing and waning  Pain location: periumbilical region  Pain - numeric: 5/10  Pain quality: aching, cramping, a sensation of fullness  Radiates to: right shoulder, back  anorexia: Yes  belching:  "Yes  flatus: Yes  hematochezia: No  melena: No  weight loss: No  Aggravated by: being still, eating  Relieved by: nothing  Diagnostic workup: CT scan, GI consult, lower endoscopy, surgery, ultrasound, upper endoscopy      Review of Systems   Review of Systems   Constitutional:  Negative for fever.   Gastrointestinal:  Positive for abdominal pain, diarrhea and nausea. Negative for constipation and vomiting.   Genitourinary:  Negative for dysuria, frequency and hematuria.   Musculoskeletal:  Positive for arthralgias and myalgias.   Neurological:  Positive for headaches.       Active Problem List     Patient Active Problem List   Diagnosis   • Numbness and tingling   • Migraine with aura and without status migrainosus, not intractable   • Laryngopharyngeal reflux (LPR)   • Antineutrophil cytoplasmic antibody (ANCA) positive   • Psychophysiological insomnia   • Nonintractable generalized 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 (HCC)   • Anxiety   • Non morbid obesity due to excess calories   • Depression   • Candida esophagitis (HCC)   • Chiari I malformation (HCC)   • Pelvic pain in female   • Muscle spasm   • IC (interstitial cystitis)   • BOR syndrome   • Obesity, Class I, BMI 30-34.9   • Ankle arthritis   • Right foot pain   • Bunion of great toe of right foot   • Flat foot   • 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   • Major depressive disorder, single episode, mild (HCC)   • RUQ pain       Past Medical History, Past Surgical History, Family History, and Social History were reviewed and updated today as appropriate.    Objective   /56 (BP Location: Left arm, Patient Position: Sitting, Cuff Size: Standard)   Pulse 89   Temp 97.7 °F (36.5 °C) (Tympanic)   Ht 5' 5\" (1.651 m)   Wt 96.2 kg (212 lb)   LMP 12/11/2023   SpO2 98%   BMI 35.28 kg/m²     Physical Exam  Constitutional:       General: She is not in acute distress.     Appearance: She is well-developed. She is obese. She is " "not ill-appearing.   HENT:      Head: Normocephalic and atraumatic.      Right Ear: External ear normal.      Left Ear: External ear normal.      Mouth/Throat:      Mouth: Mucous membranes are moist.      Pharynx: Oropharynx is clear.   Eyes:      General: No scleral icterus.        Right eye: No discharge.         Left eye: No discharge.      Conjunctiva/sclera: Conjunctivae normal.      Pupils: Pupils are equal, round, and reactive to light.   Cardiovascular:      Rate and Rhythm: Normal rate and regular rhythm.      Heart sounds: Normal heart sounds. No murmur heard.     No friction rub. No gallop.   Pulmonary:      Effort: Pulmonary effort is normal. No respiratory distress.      Breath sounds: Normal breath sounds. No wheezing, rhonchi or rales.   Abdominal:      General: Bowel sounds are normal. There is no distension.      Palpations: Abdomen is soft.      Tenderness: There is abdominal tenderness in the right upper quadrant and epigastric area.   Musculoskeletal:      Right lower leg: No edema.      Left lower leg: No edema.   Skin:     General: Skin is warm and dry.   Neurological:      General: No focal deficit present.      Mental Status: She is alert and oriented to person, place, and time.   Psychiatric:         Mood and Affect: Mood normal.         Behavior: Behavior normal.           Pertinent Laboratory/Diagnostic Studies:  Lab Results   Component Value Date    BUN 11 12/18/2023    CREATININE 0.65 12/18/2023    CALCIUM 9.0 12/18/2023    K 3.8 12/18/2023    CO2 21 12/18/2023     12/18/2023     Lab Results   Component Value Date    ALT 16 12/18/2023    AST 22 12/18/2023    ALKPHOS 129 (H) 12/18/2023       Lab Results   Component Value Date    WBC 8.61 12/18/2023    HGB 12.6 12/18/2023    HCT 39.4 12/18/2023    MCV 95 12/18/2023     12/18/2023       No results found for: \"TSH\"    No results found for: \"CHOL\"  Lab Results   Component Value Date    TRIG 119 09/30/2023     Lab Results "   Component Value Date    HDL 79 09/30/2023     Lab Results   Component Value Date    LDLCALC 108 (H) 09/30/2023     Lab Results   Component Value Date    HGBA1C 5.4 09/30/2023       Results for orders placed or performed in visit on 09/30/23   Topiramate level   Result Value Ref Range    Topiramate Lvl 10.5 2.0 - 25.0 ug/mL   CBC and differential   Result Value Ref Range    WBC 8.19 4.31 - 10.16 Thousand/uL    RBC 4.48 3.81 - 5.12 Million/uL    Hemoglobin 13.7 11.5 - 15.4 g/dL    Hematocrit 42.4 34.8 - 46.1 %    MCV 95 82 - 98 fL    MCH 30.6 26.8 - 34.3 pg    MCHC 32.3 31.4 - 37.4 g/dL    RDW 12.7 11.6 - 15.1 %    MPV 10.0 8.9 - 12.7 fL    Platelets 361 149 - 390 Thousands/uL    nRBC 0 /100 WBCs    Neutrophils Relative 51 43 - 75 %    Immat GRANS % 0 0 - 2 %    Lymphocytes Relative 41 14 - 44 %    Monocytes Relative 6 4 - 12 %    Eosinophils Relative 1 0 - 6 %    Basophils Relative 1 0 - 1 %    Neutrophils Absolute 4.19 1.85 - 7.62 Thousands/µL    Immature Grans Absolute 0.02 0.00 - 0.20 Thousand/uL    Lymphocytes Absolute 3.39 0.60 - 4.47 Thousands/µL    Monocytes Absolute 0.50 0.17 - 1.22 Thousand/µL    Eosinophils Absolute 0.04 0.00 - 0.61 Thousand/µL    Basophils Absolute 0.05 0.00 - 0.10 Thousands/µL   Comprehensive metabolic panel   Result Value Ref Range    Sodium 139 135 - 147 mmol/L    Potassium 4.0 3.5 - 5.3 mmol/L    Chloride 108 96 - 108 mmol/L    CO2 22 21 - 32 mmol/L    ANION GAP 9 mmol/L    BUN 13 5 - 25 mg/dL    Creatinine 0.67 0.60 - 1.30 mg/dL    Glucose, Fasting 80 65 - 99 mg/dL    Calcium 9.3 8.4 - 10.2 mg/dL    AST 19 13 - 39 U/L    ALT 16 7 - 52 U/L    Alkaline Phosphatase 135 (H) 34 - 104 U/L    Total Protein 7.7 6.4 - 8.4 g/dL    Albumin 3.8 3.5 - 5.0 g/dL    Total Bilirubin 0.34 0.20 - 1.00 mg/dL    eGFR 105 ml/min/1.73sq m   Lipid panel   Result Value Ref Range    Cholesterol 211 (H) See Comment mg/dL    Triglycerides 119 See Comment mg/dL    HDL, Direct 79 >=50 mg/dL    LDL Calculated 108  (H) 0 - 100 mg/dL    Non-HDL-Chol (CHOL-HDL) 132 mg/dl   HEMOGLOBIN A1C W/ EAG ESTIMATION   Result Value Ref Range    Hemoglobin A1C 5.4 Normal 4.0-5.6%; PreDiabetic 5.7-6.4%; Diabetic >=6.5%; Glycemic control for adults with diabetes <7.0% %     mg/dl   Vitamin B12   Result Value Ref Range    Vitamin B-12 670 180 - 914 pg/mL   TSH, 3rd generation with Free T4 reflex   Result Value Ref Range    TSH 3RD GENERATON 0.970 0.450 - 4.500 uIU/mL       Orders Placed This Encounter   Procedures   • US right upper quadrant   • Comprehensive metabolic panel   • CBC and differential   • Lipase           Current Medications     Current Outpatient Medications   Medication Sig Dispense Refill   • Cyanocobalamin (VITAMIN B-12 PO) Take by mouth     • dexamethasone (DECADRON) 2 mg tablet Take 1 po daily with food x 5 days 5 tablet 0   • Junel 1/20 1-20 MG-MCG per tablet      • magnesium Oxide (MAG-OX) 400 mg TABS Take 1 tablet (400 mg total) by mouth daily (Patient not taking: Reported on 1/3/2024) 30 tablet 5   • ondansetron (Zofran ODT) 4 mg disintegrating tablet Take 1 tablet (4 mg total) by mouth every 6 (six) hours as needed for nausea or vomiting (Patient not taking: Reported on 1/3/2024) 20 tablet 0   • Riboflavin 400 MG TABS Take 1 tablet (400 mg total) by mouth in the morning (Patient not taking: Reported on 1/3/2024) 30 tablet 5   • sertraline (ZOLOFT) 50 mg tablet Take 2.5 tablets (125 mg total) by mouth daily 225 tablet 3   • SUMAtriptan-naproxen (TREXIMET)  MG per tablet Take 1 po at onset of migraine, may repeat x 1 in 24hrs 10 tablet 1   • topiramate (TOPAMAX) 100 mg tablet Take 1.5 tablets  tablet 3   • dicyclomine (BENTYL) 20 mg tablet Take 1 tablet (20 mg total) by mouth every 6 (six) hours 90 tablet 1   • pantoprazole (PROTONIX) 40 mg tablet Take 1 tablet (40 mg total) by mouth 2 (two) times a day (Patient not taking: Reported on 1/3/2024) 60 tablet 0     No current facility-administered  medications for this visit.       ALLERGIES:  Allergies   Allergen Reactions   • Tetracycline Rash     Other reaction(s): Other (See Comments)  throat closes       Health Maintenance     Health Maintenance   Topic Date Due   • Depression Follow-up Plan  Never done   • Annual Physical  Never done   • COVID-19 Vaccine (3 - 2023-24 season) 09/01/2023   • HIV Screening  08/19/2024 (Originally 7/21/1990)   • Breast Cancer Screening: Mammogram  12/18/2024 (Originally 10/6/2023)   • Depression Screening  12/18/2024   • Colorectal Cancer Screening  02/25/2026   • Zoster Vaccine (1 of 2) 07/21/2026   • DTaP,Tdap,and Td Vaccines (2 - Td or Tdap) 03/05/2028   • Cervical Cancer Screening  04/03/2028   • Hepatitis C Screening  Completed   • Influenza Vaccine  Completed   • Pneumococcal Vaccine: Pediatrics (0 to 5 Years) and At-Risk Patients (6 to 64 Years)  Aged Out   • HIB Vaccine  Aged Out   • IPV Vaccine  Aged Out   • Hepatitis A Vaccine  Aged Out   • Meningococcal ACWY Vaccine  Aged Out   • HPV Vaccine  Aged Out     Immunization History   Administered Date(s) Administered   • COVID-19 MODERNA VACC 0.5 ML IM 02/10/2021, 03/10/2021   • INFLUENZA 11/21/2007, 03/29/2013, 10/10/2013, 03/06/2014, 10/01/2016, 11/07/2021   • Influenza Quadrivalent Preservative Free 3 years and older IM 09/30/2015, 10/11/2017, 11/14/2018, 09/17/2019   • Influenza Quadrivalent Recombinant Preservative Free IM 10/12/2020   • Influenza, seasonal, injectable 11/07/2021, 10/23/2022, 10/08/2023   • Tdap 03/05/2018         Emily Pagan DO   [facility]  1/12/2024  9:34 PM    Parts of this note were dictated using [employer] dictation software and may have sounds-like errors due to variation in pronunciation.

## 2023-12-19 LAB
ALBUMIN SERPL BCP-MCNC: 3.7 G/DL (ref 3.5–5)
ALP SERPL-CCNC: 129 U/L (ref 34–104)
ALT SERPL W P-5'-P-CCNC: 16 U/L (ref 7–52)
ANION GAP SERPL CALCULATED.3IONS-SCNC: 10 MMOL/L
AST SERPL W P-5'-P-CCNC: 22 U/L (ref 13–39)
BILIRUB SERPL-MCNC: 0.2 MG/DL (ref 0.2–1)
BUN SERPL-MCNC: 11 MG/DL (ref 5–25)
CALCIUM SERPL-MCNC: 9 MG/DL (ref 8.4–10.2)
CHLORIDE SERPL-SCNC: 107 MMOL/L (ref 96–108)
CO2 SERPL-SCNC: 21 MMOL/L (ref 21–32)
CREAT SERPL-MCNC: 0.65 MG/DL (ref 0.6–1.3)
GFR SERPL CREATININE-BSD FRML MDRD: 106 ML/MIN/1.73SQ M
GLUCOSE SERPL-MCNC: 91 MG/DL (ref 65–140)
LIPASE SERPL-CCNC: 30 U/L (ref 11–82)
POTASSIUM SERPL-SCNC: 3.8 MMOL/L (ref 3.5–5.3)
PROT SERPL-MCNC: 7 G/DL (ref 6.4–8.4)
SODIUM SERPL-SCNC: 138 MMOL/L (ref 135–147)

## 2023-12-20 DIAGNOSIS — K76.9 HEPATIC LESION: Primary | ICD-10-CM

## 2023-12-20 DIAGNOSIS — R10.13 EPIGASTRIC PAIN: ICD-10-CM

## 2023-12-20 RX ORDER — PANTOPRAZOLE SODIUM 40 MG/1
40 TABLET, DELAYED RELEASE ORAL 2 TIMES DAILY
Qty: 60 TABLET | Refills: 0 | Status: SHIPPED | OUTPATIENT
Start: 2023-12-20 | End: 2024-01-19

## 2024-01-03 ENCOUNTER — OFFICE VISIT (OUTPATIENT)
Dept: GASTROENTEROLOGY | Facility: CLINIC | Age: 49
End: 2024-01-03
Payer: COMMERCIAL

## 2024-01-03 VITALS
BODY MASS INDEX: 34.99 KG/M2 | SYSTOLIC BLOOD PRESSURE: 118 MMHG | HEART RATE: 80 BPM | DIASTOLIC BLOOD PRESSURE: 82 MMHG | HEIGHT: 65 IN | WEIGHT: 210 LBS

## 2024-01-03 DIAGNOSIS — R10.33 PERIUMBILICAL ABDOMINAL PAIN: Primary | ICD-10-CM

## 2024-01-03 DIAGNOSIS — K21.9 GASTROESOPHAGEAL REFLUX DISEASE WITHOUT ESOPHAGITIS: ICD-10-CM

## 2024-01-03 DIAGNOSIS — K76.9 LIVER LESION: ICD-10-CM

## 2024-01-03 DIAGNOSIS — K58.9 IRRITABLE BOWEL SYNDROME, UNSPECIFIED TYPE: ICD-10-CM

## 2024-01-03 DIAGNOSIS — R74.8 ELEVATED ALKALINE PHOSPHATASE LEVEL: ICD-10-CM

## 2024-01-03 PROCEDURE — 99213 OFFICE O/P EST LOW 20 MIN: CPT | Performed by: INTERNAL MEDICINE

## 2024-01-03 RX ORDER — DICYCLOMINE HCL 20 MG
20 TABLET ORAL EVERY 6 HOURS
Qty: 90 TABLET | Refills: 1 | Status: SHIPPED | OUTPATIENT
Start: 2024-01-03

## 2024-01-03 NOTE — PROGRESS NOTES
Outpatient Consultation -  [facility]  Renetta GANT Rendon 48 y.o. female MRN: [id number]  Encounter: [id number]    ASSESSMENT AND PLAN:    1. Periumbilical abdominal pain  2. Gastroesophageal reflux disease without esophagitis   - DDx of biliary dyskinesia, SIBO, FD, PUD, NSAID gastritis, abdominal migraine, IBS   - trial low FODMAP diet, bentyl   - SIBO breath testing prior to starting protonix 40 daily   - RTC 3-6 months    3. Elevated alkaline phosphatase level   - chronic, always in the 100s. Prior isoenzymes in 2021 showed 58% liver source. Prior liver serologies have been negative.   - trend w/ yearly LFTs. May need liver Bx in the future to assess for PBC.    4. Liver lesion   - 03/07/21 MRI abdomen suspicious for hemangioma. Seen again on RUQ US 12/18/23, 8mm and 20mm. Repeat RUQ US in 6 months. Already ordered by PCP.    HPI:    48 y.o. female w/ a PMH of migraines, and seizures who presents for abdominal pain.    Since the fall of 2023, started having episodes of nausea, abdominal pain, loss of appetite, belching, gassy, feels flushed, lasting days. Lasts several days before spontaneously resolving. TUMs somewhat helps. Saw her PCP on 12/18/23 due to severe symptoms. CMP and CBC fine.     Takes NSAIDs and tylenol frequently for headaches.    Occasional reflux, no identifiable trigger foods.    BMs formed, daily, brown. No melena or hematochezia. Though does have anxiety regarding finding a bathroom. Son has IBS-D  No tobacco, alcohol, or drugs.     12/18/23 CBC and CMP wnl, lipase neg.  12/18/23 RUQ US: well-circumscribed hyperechoic R lobe 8mm, hyperechoic inferior R lobe lesion measures as large as 2cm. Repeat in 6mo.    Last EGD/colon 02/25/21:    - distal esophagitis, due to candida; Bx neg for EoE   - mild gastritis, gastric Bx neg for Hpy   - nl duo, Bx neg for celiac   - colon and TI wnl, Bx neg for IBD or microscopic colitis, 2x transverse Tas, repeat 2026      REVIEW OF  SYSTEMS:  Otherwise pt denies any fevers, chills, lightheadedness, dizziness, CP, palpitations, SOB, N/V/D/C, abdom pain, urinary sxs, weakness/numbness/tingling, skin changes/rashes, weight loss.    Historical Information   Past Medical History:   Diagnosis Date    Interstitial cystitis     Migraines     Seizures (HCC)      Past Surgical History:   Procedure Laterality Date     SECTION      FL GUIDED NEEDLE PLAC BX/ASP/INJ  5/15/2023    UPPER GASTROINTESTINAL ENDOSCOPY       Family History   Problem Relation Age of Onset    Lung cancer Mother     Breast cancer Mother     Stroke Mother     Hyperlipidemia Mother     Atrial fibrillation Mother     Hyperlipidemia Father     Macular degeneration Father     Myasthenia gravis Father     Multiple sclerosis Sister     Seizures Brother     Atrial fibrillation Maternal Aunt     Heart disease Maternal Grandmother     Heart disease Maternal Grandfather     Seizures Paternal Grandmother     Lung cancer Paternal Grandfather     Seizures Daughter     Seizures Son     Diabetes Neg Hx     Thyroid disease Neg Hx      Social History     Tobacco Use    Smoking status: Never    Smokeless tobacco: Never   Vaping Use    Vaping status: Never Used   Substance Use Topics    Alcohol use: Not Currently     Comment: social    Drug use: Never       Meds/Allergies     Current Outpatient Medications:     Cyanocobalamin (VITAMIN B-12 PO)    dexamethasone (DECADRON) 2 mg tablet    Junel 1/20 1-20 MG-MCG per tablet    magnesium Oxide (MAG-OX) 400 mg TABS    ondansetron (Zofran ODT) 4 mg disintegrating tablet    pantoprazole (PROTONIX) 40 mg tablet    Riboflavin 400 MG TABS    sertraline (ZOLOFT) 50 mg tablet    SUMAtriptan-naproxen (TREXIMET)  MG per tablet    topiramate (TOPAMAX) 100 mg tablet  Allergies   Allergen Reactions    Tetracycline Rash     Other reaction(s): Other (See Comments)  throat closes       Objective   LMP 2023  There is no height or weight on file to calculate  BMI.    PHYSICAL EXAM:    General Appearance:   Alert, cooperative, no distress   HEENT:   Normocephalic, atraumatic, anicteric.     Neck:  Supple, symmetrical, trachea midline   Lungs:   Clear to auscultation bilaterally; no rales, rhonchi or wheezing; respirations unlabored    Heart:   Regular rate and rhythm; no murmur, rub, or gallop.   Abdomen:   Soft, mild umbilical tenderness, non-distended; normal bowel sounds; no masses, no organomegaly    Genitalia:   Deferred    Rectal:   Deferred    Extremities:  No cyanosis, clubbing or edema    Pulses:  2+ and symmetric    Skin:  No jaundice, rashes, or lesions    Lymph nodes:  No palpable cervical lymphadenopathy      Lab Results:   No visits with results within 1 Day(s) from this visit.   Latest known visit with results is:   Appointment on 12/18/2023   Component Date Value    Sodium 12/18/2023 138     Potassium 12/18/2023 3.8     Chloride 12/18/2023 107     CO2 12/18/2023 21     ANION GAP 12/18/2023 10     BUN 12/18/2023 11     Creatinine 12/18/2023 0.65     Glucose 12/18/2023 91     Calcium 12/18/2023 9.0     AST 12/18/2023 22     ALT 12/18/2023 16     Alkaline Phosphatase 12/18/2023 129 (H)     Total Protein 12/18/2023 7.0     Albumin 12/18/2023 3.7     Total Bilirubin 12/18/2023 0.20     eGFR 12/18/2023 106     WBC 12/18/2023 8.61     RBC 12/18/2023 4.14     Hemoglobin 12/18/2023 12.6     Hematocrit 12/18/2023 39.4     MCV 12/18/2023 95     MCH 12/18/2023 30.4     MCHC 12/18/2023 32.0     RDW 12/18/2023 12.6     MPV 12/18/2023 9.8     Platelets 12/18/2023 293     nRBC 12/18/2023 0     Neutrophils Relative 12/18/2023 46     Immat GRANS % 12/18/2023 0     Lymphocytes Relative 12/18/2023 46 (H)     Monocytes Relative 12/18/2023 6     Eosinophils Relative 12/18/2023 1     Basophils Relative 12/18/2023 1     Neutrophils Absolute 12/18/2023 4.09     Immature Grans Absolute 12/18/2023 0.01     Lymphocytes Absolute 12/18/2023 3.96     Monocytes Absolute 12/18/2023 0.47      Eosinophils Absolute 12/18/2023 0.04     Basophils Absolute 12/18/2023 0.04     Lipase 12/18/2023 30        Radiology Results:   US right upper quadrant    Result Date: 12/18/2023  Narrative: RIGHT UPPER QUADRANT ULTRASOUND INDICATION:   R10.11: Right upper quadrant pain. COMPARISON: MRI 3/7/2021, ultrasound 1/24/2021 TECHNIQUE:   Real-time ultrasound of the right upper quadrant was performed with a curvilinear transducer with both volumetric sweeps and still imaging techniques. FINDINGS: PANCREAS:  Visualized portions of the pancreas are within normal limits. AORTA AND IVC:  Visualized portions are normal for patient age. LIVER: Size: 15.4 cm Contour:  Surface contour is smooth. Parenchyma:  Echogenicity and echotexture are within normal limits. Well-circumscribed hyperechoic right lobe lesion measures as large as 0.8 cm, unclear whether this lesion was imaged on previous ultrasound Well-circumscribed hyperechoic inferior right lobe lesion measures as large as 2 cm, unchanged from the prior ultrasound study when measured in the same fashion Limited imaging of the main portal vein shows it to be patent and hepatopetal. BILIARY: No gallbladder findings. No intrahepatic biliary dilatation. CBD measures  4.0 mm. No choledocholithiasis. KIDNEY: Right kidney [id number] x 4.9 x 5.4 cm. Volume 154.0 mL Kidney within normal limits. ASCITES:   None.     Impression: 1.  No acute abnormality 2.  Two well-circumscribed hyperechoic hepatic lesions, recommend repeat ultrasound in 6 months to ensure stability Workstation performed: [id number]

## 2024-01-12 PROBLEM — R10.11 RUQ PAIN: Status: ACTIVE | Noted: 2024-01-12

## 2024-01-13 NOTE — ASSESSMENT & PLAN NOTE
Patient with 1 week of worsening RUQ abdominal pain.     Vital signs stable and physical exam is currently benign    Ddx includes cholelithiasis, GERD, gastric ulcer, pancreatitis.    Plan:  Initiate further workup including CBC, CMP, and lipase  RUQ US recommended to assess for gallbladder pathology  ED precautions are discussed  Recommend follow up in 1-2 weeks or sooner as needed for persistent or worsening symptoms.

## 2024-02-06 ENCOUNTER — OFFICE VISIT (OUTPATIENT)
Dept: NEUROLOGY | Facility: CLINIC | Age: 49
End: 2024-02-06
Payer: COMMERCIAL

## 2024-02-06 VITALS
DIASTOLIC BLOOD PRESSURE: 65 MMHG | HEIGHT: 65 IN | WEIGHT: 214 LBS | SYSTOLIC BLOOD PRESSURE: 121 MMHG | BODY MASS INDEX: 35.65 KG/M2 | HEART RATE: 90 BPM

## 2024-02-06 DIAGNOSIS — G43.109 MIGRAINE WITH AURA AND WITHOUT STATUS MIGRAINOSUS, NOT INTRACTABLE: Primary | ICD-10-CM

## 2024-02-06 DIAGNOSIS — F51.04 PSYCHOPHYSIOLOGICAL INSOMNIA: ICD-10-CM

## 2024-02-06 DIAGNOSIS — G40.309 NONINTRACTABLE GENERALIZED IDIOPATHIC EPILEPSY WITHOUT STATUS EPILEPTICUS (HCC): ICD-10-CM

## 2024-02-06 PROCEDURE — 99214 OFFICE O/P EST MOD 30 MIN: CPT | Performed by: PHYSICIAN ASSISTANT

## 2024-02-06 RX ORDER — UREA 10 %
500 LOTION (ML) TOPICAL 2 TIMES DAILY
COMMUNITY

## 2024-02-06 RX ORDER — TOPIRAMATE 100 MG/1
TABLET, FILM COATED ORAL
Qty: 225 TABLET | Refills: 3 | Status: SHIPPED | OUTPATIENT
Start: 2024-02-06

## 2024-02-06 NOTE — PROGRESS NOTES
Patient ID: Renetta Rendon is a 48 y.o. female.    Assessment:  Ms. Renetta Rendon is a 48 y.o. 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 She has presumed risk for recurrent seizures based on having had a possible seizure during adulthood and family history of epilepsy. She has episodic migraine headaches as well. She is on a relatively high dose of topiramate, presumably escalated for her migraine headaches as she has not had recurrent seizures for years. We again discussed reducing topiramate, and she is now agreeable.  We had previously reduced from even higher doses.  Will decrease to 100mg AM and 150mg PM.  Risk of breakthrough seizure is likely low, but not zero.  She felt the riboflavin/magnesium supplements for migraine prevention have been very helpful.  If migraines increase once we reduce the topiramate, may consider another Rx preventative.     She had a repeat MRI brain study following her last visit which was stable.  Chiari malformation clinically insignificant and unlikely contributing to migraines or causing any other symptoms.        Plan:   1 - decrease topiramate to 100mg in the AM and 150mg in the PM  2 - continue riboflavin (B2) 400mg daily and magnesium glycinate   3 - continue sertraline at current dose   4 - you may take one clonazepam 0.5mg tab as needed for anxiety attack  5 - can use Treximet at onset of migraine if needed   6 - avoid using excessive amount of ibuprofen (no more than 2 days a week, as long term use of ibuprofen can result in migraine overuse headaches).  6 - follow-up 4 months           Diagnoses and all orders for this visit:    Migraine with aura and without status migrainosus, not intractable    Nonintractable  generalized 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 (HCC)  -     topiramate (TOPAMAX) 100 mg tablet; Take 1 tablet in the AM and 1.5 tabs PM    Psychophysiological insomnia  -     sertraline (ZOLOFT) 50 mg tablet; Take 2.5 tablets (125 mg total) by mouth daily    Other orders  -     magnesium gluconate (MAGONATE) 500 mg tablet; Take 500 mg by mouth 2 (two) times a day           Subjective:    KAN Rendon is a 48 y.o. right handed female here for follow-up evaluation of epilepsy, migraines, and paresthesia.      Interval history 2/6/2024:  Patient was last seen by Dr. Hearn in August 2023.  At that time, he suggested decreasing topiramate (due to ongoing cognitive complaints), but she was hesitant due to concern for potential breakthrough seizures.  It was suggested she add magnesium and B2 for migraines.  She did not want to add another Rx med for migraine prevention.    Today, patient reports her migraines have been overall stable.  She had some stomach upset with magnesium oxide and is now taking magnesium gluconate.  She felt the B2 was helpful, but she ran out and could not find any of this at the pharmacy.  The Treximet still helps to alleviate her migraines when she needs it.  She is still experiencing some cognitive difficulty and feels it is related to the topiramate.  She is now open to considering decreasing the dose a bit, as previously suggested.     AED/side effects/compliance:  topiramate 150-150  Paresthesia, cognitive impairment (worse at higher doses, improved with dose reduction)     Event/Seizure semiology:  Absence seizures  Possible generalized tonic clonic seizure     Intake History 6/11/2021  She was diagnosed with absence seizures when she was 8 years old, she was put on Depakote, there were no further seizures. She noticed that she was losing her hair on Depakote. She was off medication since she was 14 years old. She was off AED for many years until she was 29 years  old.  When she was 29 years old, six months after she had her son, she sent a an unusual text message to her  with random numbers. He called her back to find out what happened. She does not remember what happened. She noticed that she had showered but she does not recall showering. She recalled biting her tongue. It was suspected that she had a grand mal seizure.   She saw Dr. Gallegos at [facility] staring around 2003/2004. Probably due to her migraine headaches, she was put on topiramate to manage both conditions. She was planning a pregnant at 31 years old, transitioned from topiramate to lamotrigine. She unfortunately had a seizure.   She lost this pregnancy due to genetic disorder. Then 4 months later she was pregnant with twins; she had stayed on lamotrigine and was constantly checking her lamotrigine levels. After she delivered her twins, she went right back on topiramate.   When she is on topiramate, she struggles with word finding difficulty (I feel stupid).  She does not recall if lamotrigine was causing significant side effects.  Every since 2006, she has been on topiramate. She has always been on topiramate 200mg twice a day; she does not recall ever trying a lower dose of medication.  One night she woke up in a panic, like the world was going to end, she had difficulty thinking and putting her thoughts together, she was not able to calm down her nerves or thoughts, mind was racing like crazy, panic feeling lasted 5 minutes, she was worried that a seizure was going to happen. She was fairly coherent, but did not progress to altered awareness.  She has had anxiety attacks in the past, she gets aura of panic, which then can result in a migraine headaches. She started to have migraines around middle school.   She gets migraines that can last a week, just before her menses or weather changes. Headache is so severe she has to go into a hole, severely nauseated. Head pressure sensation over the front of head  "or back of the neck. She will try Advil 2 tabs to take the edge off and Coke or coffee.   When she was pregnant she did not have migraines.  Topiramate may help with migraines. She does not get a lot of migraines (maybe 5-6 in the last 6 months). If her migraines are severe she has \"major\" medication, compound medication that cost $400. (Looking at the list of medication dichloralphenazone is no longer available in the [place], which contains chloral hydrate.)  She had tried Maxalt but it did not work. It may have been sumatriptan.      She may have used dexamethasone to abort headaches.  She is currently on sumatriptan.     Prior records from [facility]:  Last visit 1/23/2020 - she was prescribed Midrin (isometheptene-acetaminophen-dichloralphenazone) and Treximet for migraines. (she was having 5-10 days/month with headache, required steroids, under a lot of stress.)  She has restless legs, previously prescribed Quinine but she just drinks tonic water now.  Last seizure was in 2006; possible aura of panic, whole body warmth sensation  Visit on 12/5/2018 - she was having 1-2 migraines/month     Interval history 4/27/2022:  Patient was last seen 10 months ago. At timing of last visit, decision was made to reduce topiramate in order to alleviate side effects (she c/o cognitive dysfunction, paresthesia). Plan was to reduce from 200mg BID to 150mg BID.   She tells me today she has been taking topiramate 150mg AM and 200mg PM, says she did not know the plan was to reduce to 150mg BID. She is still having brain fog, cognitive dysfunction. She has trouble with word finding. No issues with ADLs, driving etc. 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  She denies any seizures since her last visit. Migraines have been “Stable”. she uses Treximet at onset of migraine. She is requesting Zofran, has previously had Rx for " "this, but not recently.  She needs refills of all of her medications. She was previously seen by Dr. Hicks who had been refilling her Zoloft and she needs a refill of that. She also needs refill of clonazepam, which she has been prescribed for anxiety to use PRN.     Interval history 8/17/2022:  Patient reports she is overall doing well since last visit. Her topiramate was reduced to 150mg BID. She also had labs done which showed low B12 and she started taking a B12 supplement. She feels she is doing better from a cognitive standpoint with these changes. She no longer has significant issues searching for words.   Her headaches were doing well but increased in the last few weeks. She recently changed birth control pills to a lower dose pill and feels that may be causing increased headaches. She just completed her first month of the new medication.  She reports she is struggling to lose weight. She is not sure she is eating the right things or not. She has been doing some research on gut health and hormones.      Interval history 12/28/2022:  Patient reports she is doing well from a neurologic standpoint. She denies any seizures, headaches are currently well controlled.  She is now following with medical weight loss center and working on diet and exercise, has lost some weight over the past few months.  She does note her mother passed away in October, which was very tough on her. She took a leave from work but will return next week.    Interval history 5/22/2023:  Patient reports she is doing well from a neurologic standpoint. She denies any seizures, headaches are currently well controlled.  In February she called the office reporting an episode of vertigo. This occurred after being treated with antibiotics for a sinus infection. After the episode of vertigo she continued to feel \"off\" and had a headache. I sent in dexamethasone for her and this cleared up her symptoms. She has not had a recurrence.   She is " currently dealing with an issue in the right foot, following with podiatry.    Interval History 8/4/2023  Since her initial consultation with me in 2021, she has had follow-up with Katina Bowers for the past couple of years. Most recently on 5/22/2023 - no recurrent seizures, headaches were under control. Her topiramate dose was reduced due to paresthesias.      She reports that there have been no seizures.  The main issue has been her migraines.   She sometimes associate her migraines with her menstrual period or with bad weather. She usually gets a migraine a week prior to her period.  Since May 2023, she has not had a really bad migraines. However, she may have headaches that happens a couple of times a month, which she will take ibuprofen with [employer], which can last 2-3 days. These headaches are different from her migraines, because she has some nausea with limited painful area over her head. Her migraines are holocephalic and very debilitating to the point that she could not function. She had a 5 day migraine headache a week ago, which sumatriptan-naproxen did not abort the headache, she tried ibuprofen, coca-cola, even a migraine ice pack that goes over her head. She did take the five days of dexamethasone 2mg daily which helped to abort the migraine. She literally drinks water all day.      She continues that her cognition is impaired, with word finding difficulty and recall short term information. She was trying to remember something that happened recently. 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 Occasionally, she will have a bit of dizziness or abnormal lightheaded sensation, but it comes on for a while even if she is seated.   She has been on Zoloft since she had her son who is now a grown adult. It was started by her family doctor. She feels that her  anxiety is stable, but when she tried to get off of sertraline, but it triggered a constant sensation of being anxious.      Woman of childbearing age with Epilepsy:  Contraception: Seasonale  Folic acid supplement: No     Prior Epilepsy History:  x     Special Features  Status epilepticus: No  Self Injury Seizures: No  Precipitating Factors: None  Post-ictal state: confusion     Epilepsy Risk Factors:  Abnormal pregnancy: No  Abnormal birth/: No  Abnormal Development: No  Febrile seizures, simple: No  Febrile seizures, complex: No  CNS infection: No  Mental retardation: No  Cerebral palsy: No  Head injury (moderate/severe): No  CNS neoplasm: No  CNS malformation: No  Neurosurgical procedure: No  Stroke: No  CNS autoimmune disorder: No  Alcohol abuse: No  Drug abuse: No  Family history Sz/epilepsy: her son (absence and grand mal seizures) and and one of her twin daughters (grand mal seizure at 4 years old); brother with febrile seizure; one brother with seizure     Prior AEDs:  medication Max dose Time used Reason to stop   divalpreox     Hair loss   lamotrigine         topiramate         Tegretol (as a child)            PRIOR Migraine trials  Abortives - Frova (side effects), Maxalt and Zomig (no help), advil, Zofran, tylenol, flexerile     Prior workup:  x  Imagin2023 - MRI brain wo  1. Grossly stable nonspecific scattered FLAIR hyperintense foci involving bilateral frontoparietal subcortical white matter which can be seen in migraines. Precocious microangiopathy is within differential consideration. Demyelinating disease is much less favored.  2. Chiari I malformation.    2019 - MRI brain  4-5 tiny foci of T2/FLAIR hyperintensity scattered in the subcortical white matter of both hemispheres.     8/10/2015 - MRI brain ([facility])  Right cerebellar tonsil protrudes through the foramen magnum unchanged from MRI 2010     EEGs:  None available       The following portions of the patient's history  "were reviewed and updated as appropriate: current medications, past family history, past medical history, past social history, past surgical history, and problem list.       Objective:    Blood pressure 121/65, pulse 90, height 5' 5\" (1.651 m), weight 97.1 kg (214 lb)    Physical Exam  Constitutional:       Appearance: Normal appearance.   Eyes:      Extraocular Movements: EOM normal.      Pupils: Pupils are equal, round, and reactive to light.   Neurological:      Mental Status: She is alert.      Motor: Motor strength is normal.     Deep Tendon Reflexes: Reflexes are normal and symmetric.   Psychiatric:         Mood and Affect: Mood normal.         Speech: Speech normal.         Behavior: Behavior normal.         Neurological Exam  Mental Status  Alert. Oriented to person, place, time and situation. Speech is normal. Language is fluent with no aphasia. Attention and concentration are normal.    Cranial Nerves  CN II: Visual fields full to confrontation.  CN III, IV, VI: Extraocular movements intact bilaterally. Pupils equal round and reactive to light bilaterally.  CN V: Facial sensation is normal.  CN VII: Full and symmetric facial movement.  CN VIII: Hearing is normal.  CN IX, X: Palate elevates symmetrically  CN XI: Shoulder shrug strength is normal.  CN XII: Tongue midline without atrophy or fasciculations.    Motor   Normal muscle tone. Strength is 5/5 throughout all four extremities.    Sensory  Light touch is normal in upper and lower extremities.     Reflexes  Deep tendon reflexes are 2+ and symmetric in all four extremities.    Coordination  Right: Finger-to-nose normal.Left: Finger-to-nose normal.    Gait  Casual gait is normal including stance, stride, and arm swing.        ROS:    Review of Systems   Constitutional:  Negative for appetite change, fatigue and fever.   HENT: Negative.  Negative for hearing loss, tinnitus, trouble swallowing and voice change.    Eyes:  Positive for pain. Negative for " photophobia and visual disturbance.   Respiratory: Negative.  Negative for shortness of breath.    Cardiovascular: Negative.  Negative for palpitations.   Gastrointestinal: Negative.  Negative for nausea and vomiting.   Endocrine: Negative.  Negative for cold intolerance.   Genitourinary: Negative.  Negative for dysuria, frequency and urgency.   Musculoskeletal:  Negative for back pain, gait problem, myalgias, neck pain and neck stiffness.   Skin: Negative.  Negative for rash.   Allergic/Immunologic: Negative.    Neurological:  Positive for headaches. Negative for dizziness, tremors, seizures, syncope, facial asymmetry, speech difficulty, weakness, light-headedness and numbness.   Hematological: Negative.  Does not bruise/bleed easily.   Psychiatric/Behavioral: Negative.  Negative for confusion, hallucinations and sleep disturbance.      I personally reviewed and updated the ROS as appropriate

## 2024-02-06 NOTE — PATIENT INSTRUCTIONS
Plan:   1 - decrease topiramate to 100mg in the AM and 150mg in the PM  2 - continue riboflavin (B2) 400mg daily and magnesium glycinate   3 - continue sertraline at current dose   4 - you may take one clonazepam 0.5mg tab as needed for anxiety attack  5 - can use Treximet at onset of migraine if needed   6 - follow-up 4 months

## 2024-04-09 ENCOUNTER — OFFICE VISIT (OUTPATIENT)
Dept: GASTROENTEROLOGY | Facility: CLINIC | Age: 49
End: 2024-04-09
Payer: COMMERCIAL

## 2024-04-09 VITALS
DIASTOLIC BLOOD PRESSURE: 72 MMHG | BODY MASS INDEX: 35.49 KG/M2 | SYSTOLIC BLOOD PRESSURE: 112 MMHG | HEART RATE: 82 BPM | WEIGHT: 213 LBS | HEIGHT: 65 IN

## 2024-04-09 DIAGNOSIS — K63.8219 SMALL INTESTINAL BACTERIAL OVERGROWTH (SIBO): Primary | ICD-10-CM

## 2024-04-09 DIAGNOSIS — K21.9 LARYNGOPHARYNGEAL REFLUX (LPR): ICD-10-CM

## 2024-04-09 DIAGNOSIS — R10.2 PELVIC PAIN IN FEMALE: ICD-10-CM

## 2024-04-09 PROCEDURE — 99214 OFFICE O/P EST MOD 30 MIN: CPT | Performed by: INTERNAL MEDICINE

## 2024-04-09 RX ORDER — NORETHINDRONE ACETATE AND ETHINYL ESTRADIOL .02; 1 MG/1; MG/1
1 TABLET ORAL DAILY
Qty: 28 TABLET | Refills: 3 | Status: SHIPPED | OUTPATIENT
Start: 2024-04-09

## 2024-04-09 RX ORDER — NORETHINDRONE ACETATE AND ETHINYL ESTRADIOL AND FERROUS FUMARATE 1MG-20(21)
1 KIT ORAL DAILY
COMMUNITY
Start: 2024-03-13

## 2024-04-09 RX ORDER — NEOMYCIN SULFATE 500 MG/1
500 TABLET ORAL 2 TIMES DAILY
Qty: 28 TABLET | Refills: 0 | Status: SHIPPED | OUTPATIENT
Start: 2024-04-09 | End: 2024-04-23

## 2024-04-09 RX ORDER — KETOCONAZOLE 20 MG/G
CREAM TOPICAL
COMMUNITY
Start: 2024-02-27

## 2024-04-09 RX ORDER — KETOCONAZOLE 20 MG/ML
SHAMPOO TOPICAL
COMMUNITY
Start: 2024-02-27

## 2024-04-09 NOTE — PROGRESS NOTES
[facility] - Outpatient Follow-up Note  Renetta Rendon 48 y.o. female MRN: [id number]  Encounter: [id number]          ASSESSMENT AND PLAN:    1. Small intestinal bacterial overgrowth (SIBO)  -Her breath test is positive for small intestine bacterial overgrowth and intestinal methanogen overgrowth  -We had extensive discussion that small intestine bacterial overgrowth and IBS has overlaping symptoms.  I recommend continue low FODMAP diet  Treat with Xifaxan and neomycin for 14 days for SIBO/IMO  -    2.  GERD/ laryngopharyngeal reflux (LPR)    3.  Isolated elevation of alkaline phosphatase  Trending down to 129 now  Will continue to monitor      4.  Liver lesions -likely hemangioma.  Stable on recent ultrasound.  The largest measured about 8 mm.  Repeat ultrasound in 6 months    ______________________________________________________________________    SUBJECTIVE: 48-year-old female with migraine headache, interstitial cystitis, here for evaluation of abdominal pain, bloating and gas.  We reviewed her SIBO test which was positive for SIBO/IMO.    She denies constipation    She is on low FODMAP diet and reports some improvement by avoiding certain food    Her ultrasound is stable      REVIEW OF SYSTEMS IS OTHERWISE NEGATIVE.      Historical Information   Past Medical History:   Diagnosis Date    Interstitial cystitis     Migraines     Seizures (HCC)      Past Surgical History:   Procedure Laterality Date     SECTION      FL GUIDED NEEDLE PLAC BX/ASP/INJ  5/15/2023    UPPER GASTROINTESTINAL ENDOSCOPY       Social History   Social History     Substance and Sexual Activity   Alcohol Use Not Currently    Comment: social     Social History     Substance and Sexual Activity   Drug Use Never     Social History     Tobacco Use   Smoking Status Never   Smokeless Tobacco Never     Family History   Problem Relation Age of Onset    Lung cancer Mother     Breast cancer Mother     Stroke  "Mother     Hyperlipidemia Mother     Atrial fibrillation Mother     Hyperlipidemia Father     Macular degeneration Father     Myasthenia gravis Father     Multiple sclerosis Sister     Seizures Brother     Atrial fibrillation Maternal Aunt     Heart disease Maternal Grandmother     Heart disease Maternal Grandfather     Seizures Paternal Grandmother     Lung cancer Paternal Grandfather     Seizures Daughter     Seizures Son     Diabetes Neg Hx     Thyroid disease Neg Hx        Meds/Allergies       Current Outpatient Medications:     Cyanocobalamin (VITAMIN B-12 PO)    dicyclomine (BENTYL) 20 mg tablet    Junel 1/20 1-20 MG-MCG per tablet    ketoconazole (NIZORAL) 2 % cream    ketoconazole (NIZORAL) 2 % shampoo    magnesium gluconate (MAGONATE) 500 mg tablet    Riboflavin 400 MG TABS    sertraline (ZOLOFT) 50 mg tablet    SUMAtriptan-naproxen (TREXIMET)  MG per tablet    topiramate (TOPAMAX) 100 mg tablet    Aurovela FE 1/20 1-20 MG-MCG per tablet    dexamethasone (DECADRON) 2 mg tablet    ondansetron (Zofran ODT) 4 mg disintegrating tablet    pantoprazole (PROTONIX) 40 mg tablet    Allergies   Allergen Reactions    Tetracycline Rash     Other reaction(s): Other (See Comments)  throat closes           Objective     Blood pressure 112/72, pulse 82, height 5' 5\" (1.651 m), weight 96.6 kg (213 lb), not currently breastfeeding. Body mass index is 35.45 kg/m².      PHYSICAL EXAM:      General Appearance:   Alert, cooperative, no distress   HEENT:   Normocephalic, atraumatic, anicteric.     Neck:  Supple, symmetrical, trachea midline   Lungs:   Clear to auscultation bilaterally; no rales, rhonchi or wheezing; respirations unlabored    Heart::   Regular rate and rhythm; no murmur, rub, or gallop.   Abdomen:   Soft, non-tender, non-distended; normal bowel sounds; no masses, no organomegaly    Genitalia:   Deferred    Rectal:   Deferred    Extremities:  No cyanosis, clubbing or edema    Pulses:  2+ and symmetric  "   Skin:  No jaundice, rashes, or lesions    Lymph nodes:  No palpable cervical lymphadenopathy        Lab Results:   No visits with results within 1 Day(s) from this visit.   Latest known visit with results is:   Appointment on 12/18/2023   Component Date Value    Sodium 12/18/2023 138     Potassium 12/18/2023 3.8     Chloride 12/18/2023 107     CO2 12/18/2023 21     ANION GAP 12/18/2023 10     BUN 12/18/2023 11     Creatinine 12/18/2023 0.65     Glucose 12/18/2023 91     Calcium 12/18/2023 9.0     AST 12/18/2023 22     ALT 12/18/2023 16     Alkaline Phosphatase 12/18/2023 129 (H)     Total Protein 12/18/2023 7.0     Albumin 12/18/2023 3.7     Total Bilirubin 12/18/2023 0.20     eGFR 12/18/2023 106     WBC 12/18/2023 8.61     RBC 12/18/2023 4.14     Hemoglobin 12/18/2023 12.6     Hematocrit 12/18/2023 39.4     MCV 12/18/2023 95     MCH 12/18/2023 30.4     MCHC 12/18/2023 32.0     RDW 12/18/2023 12.6     MPV 12/18/2023 9.8     Platelets 12/18/2023 293     nRBC 12/18/2023 0     Segmented % 12/18/2023 46     Immature Grans % 12/18/2023 0     Lymphocytes % 12/18/2023 46 (H)     Monocytes % 12/18/2023 6     Eosinophils Relative 12/18/2023 1     Basophils Relative 12/18/2023 1     Absolute Neutrophils 12/18/2023 4.09     Absolute Immature Grans 12/18/2023 0.01     Absolute Lymphocytes 12/18/2023 3.96     Absolute Monocytes 12/18/2023 0.47     Eosinophils Absolute 12/18/2023 0.04     Basophils Absolute 12/18/2023 0.04     Lipase 12/18/2023 30          Radiology Results:   No results found.

## 2024-04-10 ENCOUNTER — TELEPHONE (OUTPATIENT)
Age: 49
End: 2024-04-10

## 2024-04-10 DIAGNOSIS — K63.8219 SMALL INTESTINAL BACTERIAL OVERGROWTH (SIBO): ICD-10-CM

## 2024-04-10 NOTE — TELEPHONE ENCOUNTER
Patient called in inquiring about prior auth for the medication. Informed patient we are awaiting determination from the insurance. Pt is aware prior auth can take 7-21 business days. Patient states  will call insurance.

## 2024-04-10 NOTE — TELEPHONE ENCOUNTER
PA for Xifaxan    Submitted via    [x]CMM-KEY [id number]   [][employer]-Case ID #   []Faxed to plan   []Other website   []Phone call Case ID #     Office notes sent, clinical questions answered. Awaiting determination    Turnaround time for your insurance to make a decision on your Prior Authorization can take 7-21 business days.

## 2024-04-10 NOTE — TELEPHONE ENCOUNTER
Patients GI provider:  Dr. Gilman    Number to return call: [phone]    Reason for call: Pt called in with questions regarding birth control Dr. Gilman prescribed. Please reach out to patient, thank you.  Scheduled procedure/appointment date if applicable: Apt/procedure n/a

## 2024-04-11 ENCOUNTER — TRANSCRIBE ORDERS (OUTPATIENT)
Dept: GASTROENTEROLOGY | Facility: CLINIC | Age: 49
End: 2024-04-11

## 2024-04-11 ENCOUNTER — TELEPHONE (OUTPATIENT)
Age: 49
End: 2024-04-11

## 2024-04-11 RX ORDER — RIFAXIMIN 550 MG/1
TABLET ORAL
Qty: 42 TABLET | Refills: 0 | OUTPATIENT
Start: 2024-04-11

## 2024-04-11 NOTE — TELEPHONE ENCOUNTER
Reason for call: prior auth xifaxan   [] Refill   [x] Prior Auth  [] Other:     Office:   [] PCP/Provider -   [x] Specialty/Provider - gastro/ geme    Medication: xifaxan     Dose/Frequency: 550 mg/ 1 tab every 8 hours for 14 days     Quantity: 42 tabs     Pharmacy: [facility] pharmacy

## 2024-04-12 ENCOUNTER — TELEPHONE (OUTPATIENT)
Age: 49
End: 2024-04-12

## 2024-04-12 DIAGNOSIS — K63.8219 SMALL INTESTINAL BACTERIAL OVERGROWTH (SIBO): ICD-10-CM

## 2024-04-12 DIAGNOSIS — G43.109 MIGRAINE WITH AURA AND WITHOUT STATUS MIGRAINOSUS, NOT INTRACTABLE: ICD-10-CM

## 2024-04-12 NOTE — TELEPHONE ENCOUNTER
Please advise provider a letter of appeal is needed by the provider to submit an appeal for Xifaxan.  Thank you    April 11, 2024  Renetta Rendon   to [facility] (supporting Alf Gilman MD)         4/11/24 10:10 PM  Good evening,  I talked to [employer] and they said they have denied the claim for the medication. You can appeal it and give more information. We ca. try that and then maybe the drug rep. They also suggested I call the company for a script card? What is the company?  Thank you.  Renetta Rendon  April 12, 2024       [place]    4/12/24  1:56 PM  Yecenia Edwards routed this conversation to [facility]  Coty Tena   to [facility] Prior Authorizations   AB    4/12/24  3:02 PM  Please submit for Appeal , please let us know if anything further is needed

## 2024-04-14 NOTE — TELEPHONE ENCOUNTER
Denial letter in media      A letter of appeal is needed from provider.  Pt not diagnosed with hepatic encephalopathy or IBS-D. Thank you.

## 2024-04-15 RX ORDER — RIFAXIMIN 550 MG/1
TABLET ORAL
Qty: 42 TABLET | Refills: 0 | Status: SHIPPED | OUTPATIENT
Start: 2024-04-15 | End: 2024-04-29

## 2024-04-16 ENCOUNTER — TELEPHONE (OUTPATIENT)
Age: 49
End: 2024-04-16

## 2024-04-16 ENCOUNTER — TRANSCRIBE ORDERS (OUTPATIENT)
Dept: GASTROENTEROLOGY | Facility: CLINIC | Age: 49
End: 2024-04-16

## 2024-04-16 DIAGNOSIS — K63.8219 SMALL INTESTINAL BACTERIAL OVERGROWTH (SIBO): Primary | ICD-10-CM

## 2024-04-16 RX ORDER — AMOXICILLIN AND CLAVULANATE POTASSIUM 875; 125 MG/1; MG/1
1 TABLET, FILM COATED ORAL EVERY 12 HOURS SCHEDULED
Qty: 20 TABLET | Refills: 0 | Status: SHIPPED | OUTPATIENT
Start: 2024-04-16 | End: 2024-04-27

## 2024-04-16 RX ORDER — ONDANSETRON 4 MG/1
4 TABLET, ORALLY DISINTEGRATING ORAL EVERY 6 HOURS PRN
Qty: 30 TABLET | Refills: 0 | Status: SHIPPED | OUTPATIENT
Start: 2024-04-16

## 2024-04-16 NOTE — TELEPHONE ENCOUNTER
I returned call to patient advised Zofran sent to pharmacy as requested and we are waiting for provider to send in alternative to pharmacy as well

## 2024-04-16 NOTE — TELEPHONE ENCOUNTER
Pt called with an update on the Xilactin. She would also like a prescription for zofran sent to the pharmacy because she is extremely nauseous. She is at school and cannot answer the phone until after 10:30, but states it is ok to leave a message. Pt can be reached at [phone]

## 2024-04-19 NOTE — TELEPHONE ENCOUNTER
Called pt to advise Junel has been approved by the insurance. Your pharmacy has been made aware of your approval, please call them to see when your medication will be available for .    []Spoke with pt. Verbal understanding  [x]LMOM   []L/M to call office back. Communication consent not updated in chart  []Other

## 2024-04-19 NOTE — TELEPHONE ENCOUNTER
PA for Junel Approved   Date(s) approved until 4/16/2025  Case #    Patient advised by [x] MyChart Message                      [] Phone call       Pharmacy advised by [x]Fax                                     []Phone call    Approval letter scanned into Media Yes

## 2024-04-25 ENCOUNTER — TELEPHONE (OUTPATIENT)
Age: 49
End: 2024-04-25

## 2024-04-25 ENCOUNTER — NURSE TRIAGE (OUTPATIENT)
Age: 49
End: 2024-04-25

## 2024-04-25 ENCOUNTER — ANESTHESIA EVENT (OUTPATIENT)
Dept: PERIOP | Facility: HOSPITAL | Age: 49
End: 2024-04-25
Payer: COMMERCIAL

## 2024-04-25 ENCOUNTER — APPOINTMENT (EMERGENCY)
Dept: CT IMAGING | Facility: HOSPITAL | Age: 49
End: 2024-04-25
Payer: COMMERCIAL

## 2024-04-25 ENCOUNTER — ANESTHESIA (OUTPATIENT)
Dept: PERIOP | Facility: HOSPITAL | Age: 49
End: 2024-04-25
Payer: COMMERCIAL

## 2024-04-25 ENCOUNTER — HOSPITAL ENCOUNTER (OUTPATIENT)
Facility: HOSPITAL | Age: 49
Setting detail: OBSERVATION
Discharge: HOME | End: 2024-04-27
Attending: EMERGENCY MEDICINE | Admitting: SURGERY
Payer: COMMERCIAL

## 2024-04-25 DIAGNOSIS — K37 APPENDICITIS: ICD-10-CM

## 2024-04-25 DIAGNOSIS — Z90.49 S/P LAPAROSCOPIC APPENDECTOMY: Primary | ICD-10-CM

## 2024-04-25 PROBLEM — K35.30 ACUTE APPENDICITIS WITH LOCALIZED PERITONITIS WITHOUT PERFORATION: Status: ACTIVE | Noted: 2024-04-25

## 2024-04-25 PROBLEM — R19.7 DIARRHEA: Status: ACTIVE | Noted: 2024-04-25

## 2024-04-25 LAB
2HR DELTA HS TROPONIN: 0 NG/L
4HR DELTA HS TROPONIN: -1 NG/L
ALBUMIN SERPL BCP-MCNC: 3.6 G/DL (ref 3.5–5)
ALP SERPL-CCNC: 99 U/L (ref 34–104)
ALT SERPL W P-5'-P-CCNC: 12 U/L (ref 7–52)
ANION GAP SERPL CALCULATED.3IONS-SCNC: 11 MMOL/L (ref 4–13)
APTT PPP: 32 SECONDS (ref 23–37)
AST SERPL W P-5'-P-CCNC: 18 U/L (ref 13–39)
BASOPHILS # BLD MANUAL: 0 THOUSAND/UL (ref 0–0.1)
BASOPHILS NFR MAR MANUAL: 0 % (ref 0–1)
BILIRUB SERPL-MCNC: 0.46 MG/DL (ref 0.2–1)
BILIRUB UR QL STRIP: NEGATIVE
BUN SERPL-MCNC: 12 MG/DL (ref 5–25)
CALCIUM SERPL-MCNC: 8.8 MG/DL (ref 8.4–10.2)
CARDIAC TROPONIN I PNL SERPL HS: 4 NG/L
CARDIAC TROPONIN I PNL SERPL HS: 5 NG/L
CARDIAC TROPONIN I PNL SERPL HS: 5 NG/L
CHLORIDE SERPL-SCNC: 103 MMOL/L (ref 96–108)
CLARITY UR: CLEAR
CO2 SERPL-SCNC: 18 MMOL/L (ref 21–32)
COLOR UR: YELLOW
CREAT SERPL-MCNC: 0.8 MG/DL (ref 0.6–1.3)
EOSINOPHIL # BLD MANUAL: 0 THOUSAND/UL (ref 0–0.4)
EOSINOPHIL NFR BLD MANUAL: 0 % (ref 0–6)
ERYTHROCYTE [DISTWIDTH] IN BLOOD BY AUTOMATED COUNT: 12.6 % (ref 11.6–15.1)
FLUAV RNA RESP QL NAA+PROBE: NEGATIVE
FLUBV RNA RESP QL NAA+PROBE: NEGATIVE
GFR SERPL CREATININE-BSD FRML MDRD: 88 ML/MIN/1.73SQ M
GLUCOSE SERPL-MCNC: 143 MG/DL (ref 65–140)
GLUCOSE UR STRIP-MCNC: NEGATIVE MG/DL
HCG SERPL QL: NEGATIVE
HCT VFR BLD AUTO: 38.4 % (ref 34.8–46.1)
HGB BLD-MCNC: 12.7 G/DL (ref 11.5–15.4)
HGB UR QL STRIP.AUTO: NEGATIVE
INR PPP: 1.11 (ref 0.84–1.19)
KETONES UR STRIP-MCNC: NEGATIVE MG/DL
LACTATE SERPL-SCNC: 0.9 MMOL/L (ref 0.5–2)
LACTATE SERPL-SCNC: 2.3 MMOL/L (ref 0.5–2)
LEUKOCYTE ESTERASE UR QL STRIP: NEGATIVE
LIPASE SERPL-CCNC: 8 U/L (ref 11–82)
LYMPHOCYTES # BLD AUTO: 1.39 THOUSAND/UL (ref 0.6–4.47)
LYMPHOCYTES # BLD AUTO: 7 % (ref 14–44)
MAGNESIUM SERPL-MCNC: 1.5 MG/DL (ref 1.9–2.7)
MCH RBC QN AUTO: 30.7 PG (ref 26.8–34.3)
MCHC RBC AUTO-ENTMCNC: 33.1 G/DL (ref 31.4–37.4)
MCV RBC AUTO: 93 FL (ref 82–98)
MONOCYTES # BLD AUTO: 0.4 THOUSAND/UL (ref 0–1.22)
MONOCYTES NFR BLD: 2 % (ref 4–12)
NEUTROPHILS # BLD MANUAL: 18.01 THOUSAND/UL (ref 1.85–7.62)
NEUTS BAND NFR BLD MANUAL: 12 % (ref 0–8)
NEUTS SEG NFR BLD AUTO: 79 % (ref 43–75)
NITRITE UR QL STRIP: NEGATIVE
PH UR STRIP.AUTO: 6.5 [PH]
PLATELET # BLD AUTO: 234 THOUSANDS/UL (ref 149–390)
PLATELET BLD QL SMEAR: ADEQUATE
PMV BLD AUTO: 9.1 FL (ref 8.9–12.7)
POTASSIUM SERPL-SCNC: 3.3 MMOL/L (ref 3.5–5.3)
PROCALCITONIN SERPL-MCNC: 25.67 NG/ML
PROT SERPL-MCNC: 7.2 G/DL (ref 6.4–8.4)
PROT UR STRIP-MCNC: NEGATIVE MG/DL
PROTHROMBIN TIME: 14.4 SECONDS (ref 11.6–14.5)
RBC # BLD AUTO: 4.14 MILLION/UL (ref 3.81–5.12)
RBC MORPH BLD: NORMAL
RSV RNA RESP QL NAA+PROBE: NEGATIVE
SARS-COV-2 RNA RESP QL NAA+PROBE: NEGATIVE
SODIUM SERPL-SCNC: 132 MMOL/L (ref 135–147)
SP GR UR STRIP.AUTO: <=1.005
UROBILINOGEN UR QL STRIP.AUTO: 0.2 E.U./DL
WBC # BLD AUTO: 19.79 THOUSAND/UL (ref 4.31–10.16)

## 2024-04-25 PROCEDURE — 83735 ASSAY OF MAGNESIUM: CPT | Performed by: EMERGENCY MEDICINE

## 2024-04-25 PROCEDURE — 36415 COLL VENOUS BLD VENIPUNCTURE: CPT

## 2024-04-25 PROCEDURE — 84145 PROCALCITONIN (PCT): CPT

## 2024-04-25 PROCEDURE — 44970 LAPAROSCOPY APPENDECTOMY: CPT

## 2024-04-25 PROCEDURE — 83690 ASSAY OF LIPASE: CPT

## 2024-04-25 PROCEDURE — 84484 ASSAY OF TROPONIN QUANT: CPT

## 2024-04-25 PROCEDURE — 85027 COMPLETE CBC AUTOMATED: CPT

## 2024-04-25 PROCEDURE — 80053 COMPREHEN METABOLIC PANEL: CPT

## 2024-04-25 PROCEDURE — 83605 ASSAY OF LACTIC ACID: CPT

## 2024-04-25 PROCEDURE — 85610 PROTHROMBIN TIME: CPT

## 2024-04-25 PROCEDURE — 99285 EMERGENCY DEPT VISIT HI MDM: CPT

## 2024-04-25 PROCEDURE — 87040 BLOOD CULTURE FOR BACTERIA: CPT

## 2024-04-25 PROCEDURE — 0241U HB NFCT DS VIR RESP RNA 4 TRGT: CPT

## 2024-04-25 PROCEDURE — 74177 CT ABD & PELVIS W/CONTRAST: CPT

## 2024-04-25 PROCEDURE — 85730 THROMBOPLASTIN TIME PARTIAL: CPT

## 2024-04-25 PROCEDURE — 81003 URINALYSIS AUTO W/O SCOPE: CPT

## 2024-04-25 PROCEDURE — 88304 TISSUE EXAM BY PATHOLOGIST: CPT | Performed by: SPECIALIST

## 2024-04-25 PROCEDURE — 85007 BL SMEAR W/DIFF WBC COUNT: CPT

## 2024-04-25 PROCEDURE — 84703 CHORIONIC GONADOTROPIN ASSAY: CPT

## 2024-04-25 PROCEDURE — 96361 HYDRATE IV INFUSION ADD-ON: CPT

## 2024-04-25 PROCEDURE — 93005 ELECTROCARDIOGRAM TRACING: CPT

## 2024-04-25 PROCEDURE — 96374 THER/PROPH/DIAG INJ IV PUSH: CPT

## 2024-04-25 RX ORDER — TOPIRAMATE 100 MG/1
100 TABLET, FILM COATED ORAL ONCE
Status: COMPLETED | OUTPATIENT
Start: 2024-04-26 | End: 2024-04-26

## 2024-04-25 RX ORDER — ONDANSETRON 2 MG/ML
4 INJECTION INTRAMUSCULAR; INTRAVENOUS ONCE AS NEEDED
Status: DISCONTINUED | OUTPATIENT
Start: 2024-04-25 | End: 2024-04-25 | Stop reason: HOSPADM

## 2024-04-25 RX ORDER — HEPARIN SODIUM 5000 [USP'U]/ML
5000 INJECTION, SOLUTION INTRAVENOUS; SUBCUTANEOUS EVERY 8 HOURS SCHEDULED
Status: DISCONTINUED | OUTPATIENT
Start: 2024-04-25 | End: 2024-04-27 | Stop reason: HOSPADM

## 2024-04-25 RX ORDER — LIDOCAINE HYDROCHLORIDE 10 MG/ML
INJECTION, SOLUTION EPIDURAL; INFILTRATION; INTRACAUDAL; PERINEURAL AS NEEDED
Status: DISCONTINUED | OUTPATIENT
Start: 2024-04-25 | End: 2024-04-25

## 2024-04-25 RX ORDER — FENTANYL CITRATE/PF 50 MCG/ML
50 SYRINGE (ML) INJECTION
Status: DISCONTINUED | OUTPATIENT
Start: 2024-04-25 | End: 2024-04-25 | Stop reason: HOSPADM

## 2024-04-25 RX ORDER — PROMETHAZINE HYDROCHLORIDE 25 MG/ML
12.5 INJECTION, SOLUTION INTRAMUSCULAR; INTRAVENOUS ONCE AS NEEDED
Status: DISCONTINUED | OUTPATIENT
Start: 2024-04-25 | End: 2024-04-25 | Stop reason: HOSPADM

## 2024-04-25 RX ORDER — KETOROLAC TROMETHAMINE 30 MG/ML
15 INJECTION, SOLUTION INTRAMUSCULAR; INTRAVENOUS ONCE
Status: COMPLETED | OUTPATIENT
Start: 2024-04-25 | End: 2024-04-25

## 2024-04-25 RX ORDER — EPHEDRINE SULFATE 50 MG/ML
INJECTION INTRAVENOUS AS NEEDED
Status: DISCONTINUED | OUTPATIENT
Start: 2024-04-25 | End: 2024-04-25

## 2024-04-25 RX ORDER — SODIUM CHLORIDE, SODIUM LACTATE, POTASSIUM CHLORIDE, CALCIUM CHLORIDE 600; 310; 30; 20 MG/100ML; MG/100ML; MG/100ML; MG/100ML
INJECTION, SOLUTION INTRAVENOUS CONTINUOUS PRN
Status: DISCONTINUED | OUTPATIENT
Start: 2024-04-25 | End: 2024-04-25

## 2024-04-25 RX ORDER — KETAMINE HCL IN NACL, ISO-OSM 100MG/10ML
SYRINGE (ML) INJECTION AS NEEDED
Status: DISCONTINUED | OUTPATIENT
Start: 2024-04-25 | End: 2024-04-25

## 2024-04-25 RX ORDER — SODIUM CHLORIDE 9 MG/ML
INJECTION, SOLUTION INTRAVENOUS CONTINUOUS PRN
Status: DISCONTINUED | OUTPATIENT
Start: 2024-04-25 | End: 2024-04-25

## 2024-04-25 RX ORDER — PROPOFOL 10 MG/ML
INJECTION, EMULSION INTRAVENOUS AS NEEDED
Status: DISCONTINUED | OUTPATIENT
Start: 2024-04-25 | End: 2024-04-25

## 2024-04-25 RX ORDER — ONDANSETRON 2 MG/ML
INJECTION INTRAMUSCULAR; INTRAVENOUS AS NEEDED
Status: DISCONTINUED | OUTPATIENT
Start: 2024-04-25 | End: 2024-04-25

## 2024-04-25 RX ORDER — CEFAZOLIN SODIUM 1 G/3ML
INJECTION, POWDER, FOR SOLUTION INTRAMUSCULAR; INTRAVENOUS AS NEEDED
Status: DISCONTINUED | OUTPATIENT
Start: 2024-04-25 | End: 2024-04-25

## 2024-04-25 RX ORDER — SUCCINYLCHOLINE/SOD CL,ISO/PF 100 MG/5ML
SYRINGE (ML) INTRAVENOUS AS NEEDED
Status: DISCONTINUED | OUTPATIENT
Start: 2024-04-25 | End: 2024-04-25

## 2024-04-25 RX ORDER — MAGNESIUM HYDROXIDE 1200 MG/15ML
LIQUID ORAL AS NEEDED
Status: DISCONTINUED | OUTPATIENT
Start: 2024-04-25 | End: 2024-04-25 | Stop reason: HOSPADM

## 2024-04-25 RX ORDER — POTASSIUM CHLORIDE 14.9 MG/ML
20 INJECTION INTRAVENOUS ONCE
Status: DISCONTINUED | OUTPATIENT
Start: 2024-04-25 | End: 2024-04-25

## 2024-04-25 RX ORDER — SUMATRIPTAN AND NAPROXEN SODIUM 85; 500 MG/1; MG/1
1 TABLET, FILM COATED ORAL ONCE AS NEEDED
Status: DISCONTINUED | OUTPATIENT
Start: 2024-04-25 | End: 2024-04-27 | Stop reason: HOSPADM

## 2024-04-25 RX ORDER — HYDROMORPHONE HCL/PF 1 MG/ML
0.2 SYRINGE (ML) INJECTION EVERY 4 HOURS PRN
Status: DISCONTINUED | OUTPATIENT
Start: 2024-04-25 | End: 2024-04-27 | Stop reason: HOSPADM

## 2024-04-25 RX ORDER — ACETAMINOPHEN 325 MG/1
650 TABLET ORAL EVERY 6 HOURS PRN
Status: DISCONTINUED | OUTPATIENT
Start: 2024-04-25 | End: 2024-04-26

## 2024-04-25 RX ORDER — KETOROLAC TROMETHAMINE 30 MG/ML
15 INJECTION, SOLUTION INTRAMUSCULAR; INTRAVENOUS EVERY 6 HOURS SCHEDULED
Status: DISCONTINUED | OUTPATIENT
Start: 2024-04-25 | End: 2024-04-27 | Stop reason: HOSPADM

## 2024-04-25 RX ORDER — FENTANYL CITRATE 50 UG/ML
INJECTION, SOLUTION INTRAMUSCULAR; INTRAVENOUS AS NEEDED
Status: DISCONTINUED | OUTPATIENT
Start: 2024-04-25 | End: 2024-04-25

## 2024-04-25 RX ORDER — POTASSIUM CHLORIDE 20 MEQ/1
20 TABLET, EXTENDED RELEASE ORAL ONCE
Status: COMPLETED | OUTPATIENT
Start: 2024-04-25 | End: 2024-04-25

## 2024-04-25 RX ORDER — METRONIDAZOLE 500 MG/100ML
INJECTION, SOLUTION INTRAVENOUS CONTINUOUS PRN
Status: DISCONTINUED | OUTPATIENT
Start: 2024-04-25 | End: 2024-04-25

## 2024-04-25 RX ORDER — ONDANSETRON 2 MG/ML
4 INJECTION INTRAMUSCULAR; INTRAVENOUS EVERY 6 HOURS PRN
Status: DISCONTINUED | OUTPATIENT
Start: 2024-04-25 | End: 2024-04-27 | Stop reason: HOSPADM

## 2024-04-25 RX ORDER — HYDROMORPHONE HCL/PF 1 MG/ML
0.5 SYRINGE (ML) INJECTION
Status: DISCONTINUED | OUTPATIENT
Start: 2024-04-25 | End: 2024-04-25 | Stop reason: HOSPADM

## 2024-04-25 RX ORDER — ROCURONIUM BROMIDE 10 MG/ML
INJECTION, SOLUTION INTRAVENOUS AS NEEDED
Status: DISCONTINUED | OUTPATIENT
Start: 2024-04-25 | End: 2024-04-25

## 2024-04-25 RX ORDER — PHENYLEPHRINE HCL IN 0.9% NACL 1 MG/10 ML
SYRINGE (ML) INTRAVENOUS AS NEEDED
Status: DISCONTINUED | OUTPATIENT
Start: 2024-04-25 | End: 2024-04-25

## 2024-04-25 RX ORDER — DEXAMETHASONE SODIUM PHOSPHATE 10 MG/ML
INJECTION, SOLUTION INTRAMUSCULAR; INTRAVENOUS AS NEEDED
Status: DISCONTINUED | OUTPATIENT
Start: 2024-04-25 | End: 2024-04-25

## 2024-04-25 RX ORDER — MIDAZOLAM HYDROCHLORIDE 2 MG/2ML
INJECTION, SOLUTION INTRAMUSCULAR; INTRAVENOUS AS NEEDED
Status: DISCONTINUED | OUTPATIENT
Start: 2024-04-25 | End: 2024-04-25

## 2024-04-25 RX ORDER — PHENAZOPYRIDINE HYDROCHLORIDE 100 MG/1
100 TABLET, FILM COATED ORAL
Status: DISCONTINUED | OUTPATIENT
Start: 2024-04-25 | End: 2024-04-27 | Stop reason: HOSPADM

## 2024-04-25 RX ORDER — MORPHINE SULFATE 4 MG/ML
4 INJECTION, SOLUTION INTRAMUSCULAR; INTRAVENOUS ONCE
Status: COMPLETED | OUTPATIENT
Start: 2024-04-25 | End: 2024-04-25

## 2024-04-25 RX ORDER — ONDANSETRON 2 MG/ML
4 INJECTION INTRAMUSCULAR; INTRAVENOUS ONCE
Status: COMPLETED | OUTPATIENT
Start: 2024-04-25 | End: 2024-04-25

## 2024-04-25 RX ORDER — ALBUMIN, HUMAN INJ 5% 5 %
SOLUTION INTRAVENOUS CONTINUOUS PRN
Status: DISCONTINUED | OUTPATIENT
Start: 2024-04-25 | End: 2024-04-25

## 2024-04-25 RX ORDER — BUPIVACAINE HYDROCHLORIDE 5 MG/ML
INJECTION, SOLUTION EPIDURAL; INTRACAUDAL AS NEEDED
Status: DISCONTINUED | OUTPATIENT
Start: 2024-04-25 | End: 2024-04-25 | Stop reason: HOSPADM

## 2024-04-25 RX ORDER — OXYCODONE HYDROCHLORIDE 5 MG/1
5 TABLET ORAL EVERY 4 HOURS PRN
Status: DISCONTINUED | OUTPATIENT
Start: 2024-04-25 | End: 2024-04-27 | Stop reason: HOSPADM

## 2024-04-25 RX ORDER — SODIUM CHLORIDE 9 MG/ML
125 INJECTION, SOLUTION INTRAVENOUS CONTINUOUS
Status: DISCONTINUED | OUTPATIENT
Start: 2024-04-25 | End: 2024-04-27 | Stop reason: HOSPADM

## 2024-04-25 RX ADMIN — PHENAZOPYRIDINE 100 MG: 100 TABLET ORAL at 21:43

## 2024-04-25 RX ADMIN — METRONIDAZOLE: 500 INJECTION, SOLUTION INTRAVENOUS at 18:07

## 2024-04-25 RX ADMIN — KETOROLAC TROMETHAMINE 15 MG: 30 INJECTION, SOLUTION INTRAMUSCULAR; INTRAVENOUS at 16:35

## 2024-04-25 RX ADMIN — POTASSIUM CHLORIDE 20 MEQ: 1500 TABLET, EXTENDED RELEASE ORAL at 22:03

## 2024-04-25 RX ADMIN — PROPOFOL 250 MG: 10 INJECTION, EMULSION INTRAVENOUS at 18:00

## 2024-04-25 RX ADMIN — SODIUM CHLORIDE, SODIUM LACTATE, POTASSIUM CHLORIDE, CALCIUM CHLORIDE: 600; 310; 30; 20 INJECTION, SOLUTION INTRAVENOUS at 18:28

## 2024-04-25 RX ADMIN — OXYCODONE HYDROCHLORIDE 5 MG: 5 TABLET ORAL at 20:53

## 2024-04-25 RX ADMIN — ONDANSETRON 4 MG: 2 INJECTION INTRAMUSCULAR; INTRAVENOUS at 18:41

## 2024-04-25 RX ADMIN — FENTANYL CITRATE 25 MCG: 50 INJECTION INTRAMUSCULAR; INTRAVENOUS at 18:35

## 2024-04-25 RX ADMIN — SODIUM CHLORIDE, SODIUM LACTATE, POTASSIUM CHLORIDE, AND CALCIUM CHLORIDE: .6; .31; .03; .02 INJECTION, SOLUTION INTRAVENOUS at 18:08

## 2024-04-25 RX ADMIN — DEXAMETHASONE SODIUM PHOSPHATE 10 MG: 10 INJECTION, SOLUTION INTRAMUSCULAR; INTRAVENOUS at 18:00

## 2024-04-25 RX ADMIN — EPHEDRINE SULFATE 10 MG: 50 INJECTION, SOLUTION INTRAVENOUS at 18:26

## 2024-04-25 RX ADMIN — SUGAMMADEX 200 MG: 100 INJECTION, SOLUTION INTRAVENOUS at 18:52

## 2024-04-25 RX ADMIN — SODIUM CHLORIDE 1000 ML: 0.9 INJECTION, SOLUTION INTRAVENOUS at 14:45

## 2024-04-25 RX ADMIN — MIDAZOLAM 2 MG: 1 INJECTION INTRAMUSCULAR; INTRAVENOUS at 18:00

## 2024-04-25 RX ADMIN — HEPARIN SODIUM 5000 UNITS: 5000 INJECTION, SOLUTION INTRAVENOUS; SUBCUTANEOUS at 21:04

## 2024-04-25 RX ADMIN — Medication 200 MCG: at 18:16

## 2024-04-25 RX ADMIN — Medication 200 MCG: at 18:28

## 2024-04-25 RX ADMIN — SODIUM CHLORIDE 75 ML/HR: 0.9 INJECTION, SOLUTION INTRAVENOUS at 20:25

## 2024-04-25 RX ADMIN — FENTANYL CITRATE 50 MCG: 50 INJECTION INTRAMUSCULAR; INTRAVENOUS at 18:00

## 2024-04-25 RX ADMIN — MORPHINE SULFATE 4 MG: 4 INJECTION INTRAVENOUS at 16:34

## 2024-04-25 RX ADMIN — Medication 200 MCG: at 18:10

## 2024-04-25 RX ADMIN — PROPOFOL 80 MCG/KG/MIN: 10 INJECTION, EMULSION INTRAVENOUS at 18:05

## 2024-04-25 RX ADMIN — IOHEXOL 100 ML: 350 INJECTION, SOLUTION INTRAVENOUS at 15:44

## 2024-04-25 RX ADMIN — KETOROLAC TROMETHAMINE 15 MG: 30 INJECTION, SOLUTION INTRAMUSCULAR; INTRAVENOUS at 20:54

## 2024-04-25 RX ADMIN — LIDOCAINE HYDROCHLORIDE 50 MG: 10 INJECTION, SOLUTION EPIDURAL; INFILTRATION; INTRACAUDAL; PERINEURAL at 18:00

## 2024-04-25 RX ADMIN — Medication 100 MCG: at 18:20

## 2024-04-25 RX ADMIN — FENTANYL CITRATE 25 MCG: 50 INJECTION INTRAMUSCULAR; INTRAVENOUS at 18:48

## 2024-04-25 RX ADMIN — ROCURONIUM BROMIDE 30 MG: 10 INJECTION, SOLUTION INTRAVENOUS at 18:16

## 2024-04-25 RX ADMIN — Medication 100 MCG: at 19:11

## 2024-04-25 RX ADMIN — Medication 100 MG: at 18:00

## 2024-04-25 RX ADMIN — ALBUMIN (HUMAN): 12.5 INJECTION, SOLUTION INTRAVENOUS at 18:28

## 2024-04-25 RX ADMIN — TOPIRAMATE 150 MG: 25 TABLET, FILM COATED ORAL at 21:01

## 2024-04-25 RX ADMIN — Medication 20 MG: at 18:22

## 2024-04-25 RX ADMIN — Medication 10 MG: at 18:46

## 2024-04-25 RX ADMIN — CEFAZOLIN 2000 MG: 1 INJECTION, POWDER, FOR SOLUTION INTRAMUSCULAR; INTRAVENOUS at 18:06

## 2024-04-25 RX ADMIN — SODIUM CHLORIDE: 0.9 INJECTION, SOLUTION INTRAVENOUS at 17:49

## 2024-04-25 RX ADMIN — Medication 100 MCG: at 18:09

## 2024-04-25 RX ADMIN — Medication 4.5 G: at 22:24

## 2024-04-25 RX ADMIN — ONDANSETRON 4 MG: 2 INJECTION INTRAMUSCULAR; INTRAVENOUS at 14:42

## 2024-04-25 RX ADMIN — Medication 100 MCG: at 18:07

## 2024-04-25 RX ADMIN — POTASSIUM CHLORIDE 20 MEQ: 14.9 INJECTION, SOLUTION INTRAVENOUS at 20:58

## 2024-04-25 NOTE — LETTER
[facility]  [address] [place] [address]  No information on file.    April 27, 2024     Patient: Renetta Rendon   YOB: 1975   Date of Visit: 4/25/2024       To Whom it May Concern:    Renetta Rendon is under my professional care. She was seen in the hospital from 4/25/2024 to 04/27/24. She may return to work on May 2-9, 2024 with the following limitations : No heavy lifting, pushing, pulling >10 lbs .    If you have any questions or concerns, please don't hesitate to call.         Sincerely,          Bibi Gleason PA-C

## 2024-04-25 NOTE — ED PROVIDER NOTES
History  Chief Complaint   Patient presents with    Abdominal Pain     X2 days.  With diarrhea. Pain described as stabbing pain at umbilicus now radiating to LLQ      Patient is a 48-year-old female past medical history of interstitial cystitis, seizures arriving for evaluation of lower abdominal pain.  Patient reports that yesterday she started having multiple episodes of diarrhea she attributed to nurse secondary to having to go to her uncle's weight that day.  Patient reports that on the drive out she cannot get comfortable see.  Patient reports that she returned home and felt fatigued and had increasing lower abdominal pain.  Patient reports that her belly pain started as periumbilical pain.  Patient reports that she now has bilateral lower abdominal pain.  Worse at the periumbilical area.  Patient denies any urinary symptoms.  Patient reports multiple episodes of nausea, vomiting.  Patient reports last night she had multiple episodes of vomiting.  Patient denies any shortness of breath.  Patient reports HPI pressure she is attributing to the sensation of feeling bloated and pushing her chest.  Past surgical history includes 2 C-sections.  Patient also reporting that she took Tylenol just prior to arrival.  Patient also reports decrease in appetite.        Prior to Admission Medications   Prescriptions Last Dose Informant Patient Reported? Taking?   Aurovela FE 1/20 1-20 MG-MCG per tablet Not Taking  Yes No   Sig: Take 1 tablet by mouth daily As directed   Patient not taking: Reported on 4/9/2024   Cyanocobalamin (VITAMIN B-12 PO) Not Taking Self Yes No   Sig: Take by mouth   Patient not taking: Reported on 4/25/2024   Junel 1/20 1-20 MG-MCG per tablet 4/24/2024 Self Yes Yes   Riboflavin 400 MG TABS 4/25/2024 Self No Yes   Sig: Take 1 tablet (400 mg total) by mouth in the morning   SUMAtriptan-naproxen (TREXIMET)  MG per tablet 4/24/2024 Self No Yes   Sig: Take 1 po at onset of migraine, may repeat x 1 in  24hrs   amoxicillin-clavulanate (AUGMENTIN) 875-125 mg per tablet Not Taking  No No   Sig: Take 1 tablet by mouth every 12 (twelve) hours for 10 days   Patient not taking: Reported on 2024   dexamethasone (DECADRON) 2 mg tablet Not Taking Self No No   Sig: Take 1 po daily with food x 5 days   Patient not taking: Reported on 2024   dicyclomine (BENTYL) 20 mg tablet 2024  No Yes   Sig: Take 1 tablet (20 mg total) by mouth every 6 (six) hours   ketoconazole (NIZORAL) 2 % cream 2024  Yes Yes   Sig: APPLY TO AFFECTED AREAS ON THE NECK AND EARS TWICE A DAY FOR 4 WEEKS THEN AS NEEDED   ketoconazole (NIZORAL) 2 % shampoo Past Month  Yes Yes   Sig: SHAMPOO 2-3 TIMES WEEKLY   magnesium gluconate (MAGONATE) 500 mg tablet 2024  Yes Yes   Sig: Take 500 mg by mouth 2 (two) times a day   norethindrone-ethinyl estradiol (Junel ) 1-20 MG-MCG per tablet 2024  No Yes   Sig: Take 1 tablet by mouth daily   ondansetron (Zofran ODT) 4 mg disintegrating tablet Unknown  No No   Sig: Take 1 tablet (4 mg total) by mouth every 6 (six) hours as needed for nausea or vomiting   pantoprazole (PROTONIX) 40 mg tablet   No No   Sig: Take 1 tablet (40 mg total) by mouth 2 (two) times a day   rifaximin (Xifaxan) 550 mg tablet 2024  No Yes   Sig: TAKE 1 TABLET (550 MG TOTAL) BY MOUTH EVERY 8 HOURS FOR 14 DAYS   sertraline (ZOLOFT) 50 mg tablet 2024  No Yes   Sig: Take 2.5 tablets (125 mg total) by mouth daily   topiramate (TOPAMAX) 100 mg tablet 2024  No Yes   Sig: Take 1 tablet in the AM and 1.5 tabs PM      Facility-Administered Medications: None       Past Medical History:   Diagnosis Date    Interstitial cystitis     Migraines     Seizures (HCC)        Past Surgical History:   Procedure Laterality Date     SECTION      FL GUIDED NEEDLE PLAC BX/ASP/INJ  5/15/2023    UPPER GASTROINTESTINAL ENDOSCOPY         Family History   Problem Relation Age of Onset    Lung cancer Mother     Breast cancer  Mother     Stroke Mother     Hyperlipidemia Mother     Atrial fibrillation Mother     Hyperlipidemia Father     Macular degeneration Father     Myasthenia gravis Father     Multiple sclerosis Sister     Seizures Brother     Atrial fibrillation Maternal Aunt     Heart disease Maternal Grandmother     Heart disease Maternal Grandfather     Seizures Paternal Grandmother     Lung cancer Paternal Grandfather     Seizures Daughter     Seizures Son     Diabetes Neg Hx     Thyroid disease Neg Hx      I have reviewed and agree with the history as documented.    E-Cigarette/Vaping    E-Cigarette Use Never User      E-Cigarette/Vaping Substances    Nicotine No     THC No     CBD No     Flavoring No     Other No     Unknown No      Social History     Tobacco Use    Smoking status: Never    Smokeless tobacco: Never   Vaping Use    Vaping status: Never Used   Substance Use Topics    Alcohol use: Not Currently     Comment: social    Drug use: Never       Review of Systems   Constitutional:  Positive for appetite change, fatigue and fever.   HENT: Negative.     Eyes: Negative.    Respiratory: Negative.     Cardiovascular: Negative.    Gastrointestinal:  Positive for abdominal pain, diarrhea, nausea and vomiting.   Endocrine: Negative.    Genitourinary: Negative.    Musculoskeletal: Negative.    Skin: Negative.    Allergic/Immunologic: Negative.    Neurological: Negative.    Hematological: Negative.    Psychiatric/Behavioral: Negative.     All other systems reviewed and are negative.      Physical Exam  Physical Exam  Vitals and nursing note reviewed.   Constitutional:       Appearance: She is well-developed and normal weight.   HENT:      Head: Normocephalic.      Mouth/Throat:      Mouth: Mucous membranes are moist.   Eyes:      Extraocular Movements: Extraocular movements intact.   Cardiovascular:      Rate and Rhythm: Normal rate and regular rhythm.      Heart sounds: Normal heart sounds.   Pulmonary:      Effort: Pulmonary  effort is normal.      Breath sounds: Normal breath sounds.   Abdominal:      General: Abdomen is flat. Bowel sounds are normal.      Palpations: Abdomen is soft.      Tenderness: There is abdominal tenderness in the right lower quadrant, periumbilical area and left lower quadrant. Negative signs include Olsen's sign.      Hernia: No hernia is present.   Skin:     General: Skin is warm.      Capillary Refill: Capillary refill takes less than 2 seconds.   Neurological:      General: No focal deficit present.      Mental Status: She is alert and oriented to person, place, and time.         Vital Signs  ED Triage Vitals   Temperature Pulse Respirations Blood Pressure SpO2   04/25/24 1416 04/25/24 1415 04/25/24 1415 04/25/24 1416 04/25/24 1415   98.7 °F (37.1 °C) (!) 108 20 132/62 97 %      Temp Source Heart Rate Source Patient Position - Orthostatic VS BP Location FiO2 (%)   04/25/24 1416 04/25/24 1737 04/25/24 1416 04/25/24 1416 --   Oral Monitor Sitting Left arm       Pain Score       04/25/24 1421       8           Vitals:    04/25/24 1415 04/25/24 1416 04/25/24 1607 04/25/24 1737   BP:  132/62 112/56 111/54   Pulse: (!) 108   94   Patient Position - Orthostatic VS:  Sitting           Visual Acuity      ED Medications  Medications   piperacillin-tazobactam (ZOSYN) IVPB 4.5 g (has no administration in time range)   ondansetron (ZOFRAN) injection 4 mg (has no administration in time range)   heparin (porcine) subcutaneous injection 5,000 Units (has no administration in time range)   sodium chloride 0.9 % infusion (has no administration in time range)   potassium chloride 20 mEq IVPB (premix) (has no administration in time range)   topiramate (TOPAMAX) tablet 150 mg (has no administration in time range)   topiramate (TOPAMAX) tablet 100 mg (has no administration in time range)   sertraline (ZOLOFT) tablet 125 mg (has no administration in time range)   SUMAtriptan-naproxen (TREXIMET)  MG per tablet 1 tablet (has  no administration in time range)   sodium chloride 0.9 % bolus 1,000 mL (1,000 mL Intravenous New Bag 4/25/24 1445)   ondansetron (ZOFRAN) injection 4 mg (4 mg Intravenous Given 4/25/24 1442)   morphine injection 4 mg (4 mg Intravenous Given 4/25/24 1634)   ketorolac (TORADOL) injection 15 mg (15 mg Intravenous Given 4/25/24 1635)   iohexol (OMNIPAQUE) 350 MG/ML injection (MULTI-DOSE) 100 mL (100 mL Intravenous Given 4/25/24 1544)       Diagnostic Studies  Results Reviewed       Procedure Component Value Units Date/Time    Magnesium [[id number]]  (Abnormal) Collected: 04/25/24 1453    Lab Status: Final result Specimen: Blood from Arm, Left Updated: 04/25/24 1712     Magnesium 1.5 mg/dL     Lactic acid, plasma (w/reflex if result > 2.0) [[id number]]  (Abnormal) Collected: 04/25/24 1620    Lab Status: Final result Specimen: Blood from Arm, Left Updated: 04/25/24 1710     LACTIC ACID 2.3 mmol/L     Narrative:      Result may be elevated if tourniquet was used during collection.    Lactic acid 2 Hours [[id number]]     Lab Status: No result Specimen: Blood     HS Troponin I 2hr [[id number]]  (Normal) Collected: 04/25/24 1604    Lab Status: Final result Specimen: Blood Updated: 04/25/24 1704     hs TnI 2hr 5 ng/L      Delta 2hr hsTnI 0 ng/L     Procalcitonin [[id number]]  (Abnormal) Collected: 04/25/24 1620    Lab Status: Final result Specimen: Blood from Arm, Left Updated: 04/25/24 1656     Procalcitonin 25.67 ng/ml     Protime-INR [[id number]]  (Normal) Collected: 04/25/24 1634    Lab Status: Final result Specimen: Blood from Arm, Right Updated: 04/25/24 1654     Protime 14.4 seconds      INR 1.11    APTT [[id number]]  (Normal) Collected: 04/25/24 1634    Lab Status: Final result Specimen: Blood from Arm, Right Updated: 04/25/24 1654     PTT 32 seconds     FLU/RSV/COVID - if FLU/RSV clinically relevant [[id number]]  (Normal) Collected: 04/25/24 1606    Lab Status: Final result Specimen: Nares from Nose Updated: 04/25/24 9077      SARS-CoV-2 Negative     INFLUENZA A PCR Negative     INFLUENZA B PCR Negative     RSV PCR Negative    Narrative:      FOR PEDIATRIC PATIENTS - copy/paste COVID Guidelines URL to browser: [email or web address]    SARS-CoV-2 assay is a Nucleic Acid Amplification assay intended for the  qualitative detection of nucleic acid from SARS-CoV-2 in nasopharyngeal  swabs. Results are for the presumptive identification of SARS-CoV-2 RNA.    Positive results are indicative of infection with SARS-CoV-2, the virus  causing COVID-19, but do not rule out bacterial infection or co-infection  with other viruses. Laboratories within the [place] and its  territories are required to report all positive results to the appropriate  public health authorities. Negative results do not preclude SARS-CoV-2  infection and should not be used as the sole basis for treatment or other  patient management decisions. Negative results must be combined with  clinical observations, patient history, and epidemiological information.  This test has not been FDA cleared or approved.    This test has been authorized by FDA under an Emergency Use Authorization  (EUA). This test is only authorized for the duration of time the  declaration that circumstances exist justifying the authorization of the  emergency use of an in vitro diagnostic tests for detection of SARS-CoV-2  virus and/or diagnosis of COVID-19 infection under section 564(b)(1) of  the Act, 21 U.S.C. 360bbb-3(b)(1), unless the authorization is terminated  or revoked sooner. The test has been validated but independent review by FDA  and CLIA is pending.    Test performed using iPeen: This RT-PCR assay targets N2,  a region unique to SARS-CoV-2. A conserved region in the E-gene was chosen  for pan-Sarbecovirus detection which includes SARS-CoV-2.    According to CMS-2020-01-R, this platform meets the definition of  high-throughput technology.    Blood culture #2 [[id number]] Collected: 04/25/24 1620    Lab Status: In process Specimen: Blood from Arm, Left Updated: 04/25/24 1624    UA w Reflex to Microscopic w Reflex to Culture [[id number]]  (Abnormal) Collected: 04/25/24 1605    Lab Status: Final result Specimen: Urine, Clean Catch Updated: 04/25/24 1624     Color, UA Yellow     Clarity, UA Clear     Specific Gravity, UA <=1.005     pH, UA 6.5     Leukocytes, UA Negative     Nitrite, UA Negative     Protein, UA Negative mg/dl      Glucose, UA Negative mg/dl      Ketones, UA Negative mg/dl      Urobilinogen, UA 0.2 E.U./dl      Bilirubin, UA Negative     Occult Blood, UA Negative    Blood culture #1 [[id number]] Collected: 04/25/24 1607    Lab Status: In process Specimen: Blood from Arm, Right Updated: 04/25/24 1611    HS Troponin I 4hr [[id number]]     Lab Status: No result Specimen: Blood     RBC Morphology Reflex Test [[id number]] Collected: 04/25/24 1453    Lab Status: Final result Specimen: Blood from Arm, Left Updated: 04/25/24 1602    CBC and differential [[id number]]  (Abnormal) Collected: 04/25/24 1453    Lab Status: Final result Specimen: Blood from Arm, Left Updated: 04/25/24 1535     WBC 19.79 Thousand/uL      RBC 4.14 Million/uL      Hemoglobin 12.7 g/dL      Hematocrit 38.4 %      MCV 93 fL      MCH 30.7 pg      MCHC 33.1 g/dL      RDW 12.6 %      MPV 9.1 fL      Platelets 234 Thousands/uL     Narrative:      This is an appended report.  These results have been appended to a previously verified report.    Manual Differential(PHLEBS Do Not Order) [[id number]]  (Abnormal) Collected: 04/25/24 1453    Lab Status: Final result Specimen: Blood from Arm, Left Updated: 04/25/24 1535     Segmented % 79 %      Bands % 12 %      Lymphocytes % 7 %      Monocytes % 2 %      Eosinophils % 0 %      Basophils % 0 %      Absolute Neutrophils 18.01 Thousand/uL      Absolute Lymphocytes 1.39 Thousand/uL      Absolute Monocytes 0.40  Thousand/uL      Absolute Eosinophils 0.00 Thousand/uL      Absolute Basophils 0.00 Thousand/uL      Total Counted --     RBC Morphology Normal     Platelet Estimate Adequate    HS Troponin 0hr (reflex protocol) [[id number]]  (Normal) Collected: 04/25/24 1453    Lab Status: Final result Specimen: Blood from Arm, Left Updated: 04/25/24 1528     hs TnI 0hr 5 ng/L     hCG, qualitative pregnancy [[id number]]  (Normal) Collected: 04/25/24 1455    Lab Status: Final result Specimen: Blood from Arm, Left Updated: 04/25/24 1527     Preg, Serum Negative    Lipase [[id number]]  (Abnormal) Collected: 04/25/24 1453    Lab Status: Final result Specimen: Blood from Arm, Left Updated: 04/25/24 1519     Lipase 8 u/L     CMP [[id number]]  (Abnormal) Collected: 04/25/24 1453    Lab Status: Final result Specimen: Blood from Arm, Left Updated: 04/25/24 1519     Sodium 132 mmol/L      Potassium 3.3 mmol/L      Chloride 103 mmol/L      CO2 18 mmol/L      ANION GAP 11 mmol/L      BUN 12 mg/dL      Creatinine 0.80 mg/dL      Glucose 143 mg/dL      Calcium 8.8 mg/dL      AST 18 U/L      ALT 12 U/L      Alkaline Phosphatase 99 U/L      Total Protein 7.2 g/dL      Albumin 3.6 g/dL      Total Bilirubin 0.46 mg/dL      eGFR 88 ml/min/1.73sq m     Narrative:      National Kidney Disease Foundation guidelines for Chronic Kidney Disease (CKD):     Stage 1 with normal or high GFR (GFR > 90 mL/min/1.73 square meters)    Stage 2 Mild CKD (GFR = 60-89 mL/min/1.73 square meters)    Stage 3A Moderate CKD (GFR = 45-59 mL/min/1.73 square meters)    Stage 3B Moderate CKD (GFR = 30-44 mL/min/1.73 square meters)    Stage 4 Severe CKD (GFR = 15-29 mL/min/1.73 square meters)    Stage 5 End Stage CKD (GFR <15 mL/min/1.73 square meters)  Note: GFR calculation is accurate only with a steady state creatinine                   CT abdomen pelvis with contrast   Final Result by Randal Adams DO (04/25 1624)      Acute appendicitis.      Mild reactive thickening of  the cecum suggested. Some fluid is noted within the right colon, correlate for diarrheal illness.      Nonobstructing bilateral renal calculi.      I personally discussed this study with Chandrakant Gamble   on 4/25/2024 at 4:17 PM.               Workstation performed: [id number]                    Procedures  ECG 12 Lead Documentation Only    Date/Time: 4/25/2024 2:24 PM    Performed by: ALFIE Wolfe  Authorized by: ALFIE Wolfe    Indications / Diagnosis:  Chest discomfort  ECG reviewed by me, the ED Provider: yes    Patient location:  ED  Interpretation:     Interpretation: normal    Rate:     ECG rate:  97    ECG rate assessment: normal    Rhythm:     Rhythm: sinus rhythm    Ectopy:     Ectopy: none    QRS:     QRS axis:  Normal  Conduction:     Conduction: normal    ST segments:     ST segments:  Normal  T waves:     T waves: normal    US Guided Peripheral IV    Date/Time: 4/25/2024 4:20 PM    Performed by: ALFIE Wolfe  Authorized by: ALFIE Wolfe    Patient location:  ED  Performed by:  NP/PA  Indications:     Indications: difficulty obtaining IV access    Procedure details:     Location:  Right arm    Catheter size:  22 gauge    Successful placement: yes    Post-procedure details:     Post-procedure:  Dressing applied    Assessment: free fluid flow and no signs of infiltration      Patient tolerance of procedure:  Tolerated well, no immediate complications  CriticalCare Time    Date/Time: 4/25/2024 4:15 PM    Performed by: ALFIE Wolfe  Authorized by: ALFIE Wolfe    Critical care provider statement:     Critical care time (minutes):  35    Critical care was necessary to treat or prevent imminent or life-threatening deterioration of the following conditions:  Sepsis, dehydration and circulatory failure    Critical care was time spent personally by me on the following activities:  Blood draw for specimens, interpretation of cardiac output  measurements, examination of patient, review of old charts, re-evaluation of patient's condition, ordering and review of radiographic studies, ordering and review of laboratory studies, ordering and performing treatments and interventions, evaluation of patient's response to treatment, development of treatment plan with patient or surrogate, obtaining history from patient or surrogate and discussions with consultants           ED Course  ED Course as of 04/25/24 1742   Thu Apr 25, 2024   1504 WBC(!): 19.79   1535 PREGNANCY, SERUM: Negative   1657 Procalcitonin(!): 25.67             HEART Risk Score      Flowsheet Row Most Recent Value   Heart Score Risk Calculator    History 0 Filed at: 04/25/2024 1515   ECG 0 Filed at: 04/25/2024 1515   Age 1 Filed at: 04/25/2024 1515   Risk Factors 0 Filed at: 04/25/2024 1515   Troponin 0 Filed at: 04/25/2024 1515   HEART Score 1 Filed at: 04/25/2024 1515                          SBIRT 22yo+      Flowsheet Row Most Recent Value   Initial Alcohol Screen: US AUDIT-C     1. How often do you have a drink containing alcohol? 0 Filed at: 04/25/2024 1427   2. How many drinks containing alcohol do you have on a typical day you are drinking?  0 Filed at: 04/25/2024 1427   3a. Male UNDER 65: How often do you have five or more drinks on one occasion? 0 Filed at: 04/25/2024 1427   3b. FEMALE Any Age, or MALE 65+: How often do you have 4 or more drinks on one occassion? 0 Filed at: 04/25/2024 1427   Audit-C Score 0 Filed at: 04/25/2024 1427   GURDEEP: How many times in the past year have you...    Used an illegal drug or used a prescription medication for non-medical reasons? Never Filed at: 04/25/2024 1427                      Medical Decision Making  DDx: appendicitis, diverticulitis, CIRO, electrolyte abnormality   Patient reporting lower abdominal pain specifically periumbilical pain, diarrhea, nausea, vomiting.  Patient's exam is concerning for acute appendicitis.  CT abdomen pelvis ordered.   Abdominal pain workup ordered.  Patient has a heart score of 1.  Patient relating that pressure in her chest is related to the bloated feeling in her abdomen.  As this has been going on greater than 3 hours, no further troponins are required.  Patient has a nonischemic EKG.  Patient noted to have a 19,000 white count.  Remainder of septic labs added on.  Discussed this with the nurses, who reports multiple nurses have attempted to obtain blood work.  Additionally the IV access that they had obtained was lost in this effort.  Ultrasound-guided IV placed in patient's right AC.  Fluids continued. Potassium replaced.  Patient noted to have an elevated procalcitonin.  Review of CT scan appears to be abnormal.  Based on patient's clinical exam, leukocytosis did reach out to surgery prior to official results of CT returning.  Reached out to radiology liaison for expedited read.    Patient was admitted to service of Dr. Allen.  Patient to go to the OR this evening for acute appendicitis.  No evidence of UTI on UA.  Patient's lactic acidosis was resulted after patient was under the service of Dr. Allen. Antibiotics already ordered.      Amount and/or Complexity of Data Reviewed  Labs: ordered. Decision-making details documented in ED Course.  Radiology: ordered.    Risk  Prescription drug management.  Decision regarding hospitalization.             Disposition  Final diagnoses:   Appendicitis     Time reflects when diagnosis was documented in both MDM as applicable and the Disposition within this note       Time User Action Codes Description Comment    4/25/2024  4:20 PM Chandrakant Gamble Add [K37] Appendicitis           ED Disposition       ED Disposition   Admit    Condition   Stable    Date/Time   u Apr 25, 2024 6330    Comment   Case was discussed with surgery and the patient's admission status was agreed to be Admission Status: observation status to the service of Dr. Allen .               Follow-up  Information    None         Current Discharge Medication List        CONTINUE these medications which have NOT CHANGED    Details   dicyclomine (BENTYL) 20 mg tablet Take 1 tablet (20 mg total) by mouth every 6 (six) hours  Qty: 90 tablet, Refills: 1    Associated Diagnoses: Irritable bowel syndrome, unspecified type      !! Junel 1/20 1-20 MG-MCG per tablet       ketoconazole (NIZORAL) 2 % cream APPLY TO AFFECTED AREAS ON THE NECK AND EARS TWICE A DAY FOR 4 WEEKS THEN AS NEEDED      ketoconazole (NIZORAL) 2 % shampoo SHAMPOO 2-3 TIMES WEEKLY      magnesium gluconate (MAGONATE) 500 mg tablet Take 500 mg by mouth 2 (two) times a day      !! norethindrone-ethinyl estradiol (Junel 1/20) 1-20 MG-MCG per tablet Take 1 tablet by mouth daily  Qty: 28 tablet, Refills: 3    Associated Diagnoses: Pelvic pain in female      Riboflavin 400 MG TABS Take 1 tablet (400 mg total) by mouth in the morning  Qty: 30 tablet, Refills: 5    Associated Diagnoses: Migraine with aura and without status migrainosus, not intractable      rifaximin (Xifaxan) 550 mg tablet TAKE 1 TABLET (550 MG TOTAL) BY MOUTH EVERY 8 HOURS FOR 14 DAYS  Qty: 42 tablet, Refills: 0    Associated Diagnoses: Small intestinal bacterial overgrowth (SIBO)      sertraline (ZOLOFT) 50 mg tablet Take 2.5 tablets (125 mg total) by mouth daily  Qty: 225 tablet, Refills: 3    Associated Diagnoses: Psychophysiological insomnia      SUMAtriptan-naproxen (TREXIMET)  MG per tablet Take 1 po at onset of migraine, may repeat x 1 in 24hrs  Qty: 10 tablet, Refills: 1    Associated Diagnoses: Migraine with aura and without status migrainosus, not intractable      topiramate (TOPAMAX) 100 mg tablet Take 1 tablet in the AM and 1.5 tabs PM  Qty: 225 tablet, Refills: 3    Associated Diagnoses: Nonintractable generalized 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 (HCC)      amoxicillin-clavulanate (AUGMENTIN) 875-125 mg per tablet Take 1 tablet by mouth every 12 (twelve) hours  for 10 days  Qty: 20 tablet, Refills: 0    Associated Diagnoses: Small intestinal bacterial overgrowth (SIBO)      Aurovela FE 1/20 1-20 MG-MCG per tablet Take 1 tablet by mouth daily As directed      Cyanocobalamin (VITAMIN B-12 PO) Take by mouth      dexamethasone (DECADRON) 2 mg tablet Take 1 po daily with food x 5 days  Qty: 5 tablet, Refills: 0    Associated Diagnoses: Migraine with aura and without status migrainosus, not intractable      ondansetron (Zofran ODT) 4 mg disintegrating tablet Take 1 tablet (4 mg total) by mouth every 6 (six) hours as needed for nausea or vomiting  Qty: 30 tablet, Refills: 0    Associated Diagnoses: Migraine with aura and without status migrainosus, not intractable      pantoprazole (PROTONIX) 40 mg tablet Take 1 tablet (40 mg total) by mouth 2 (two) times a day  Qty: 60 tablet, Refills: 0    Associated Diagnoses: Epigastric pain       !! - Potential duplicate medications found. Please discuss with provider.          No discharge procedures on file.    PDMP Review         Value Time User    PDMP Reviewed  Yes 12/18/2023 12:19 PM Emily Pagan DO            ED Provider  Electronically Signed by             ALFIE Wolfe  04/25/24 [id number]

## 2024-04-25 NOTE — ANESTHESIA POSTPROCEDURE EVALUATION
Post-Op Assessment Note    CV Status:  Stable    Pain management: satisfactory to patient       Mental Status:  Awake, sleepy and arousable   Hydration Status:  Euvolemic   PONV Controlled:  Controlled   Airway Patency:  Patent     Post Op Vitals Reviewed: Yes    No anethesia notable event occurred.    Staff: Anesthesiologist, CRNA               BP   83/46   Temp      Pulse  103   Resp   16   SpO2   100

## 2024-04-25 NOTE — ANESTHESIA PREPROCEDURE EVALUATION
Procedure:  APPENDECTOMY LAPAROSCOPIC (Abdomen)    Abdominal pain/diarrhea x 2 days  Leukocytosis with bandemia, POA  Hx of GI dyspepsia, [place]    Relevant Problems   CARDIO   (+) Migraine with aura and without status migrainosus, not intractable      /RENAL   (+) BOR syndrome      NEURO/PSYCH   (+) Anxiety   (+) Depression   (+) Major depressive disorder, single episode, mild (HCC)   (+) Migraine with aura and without status migrainosus, not intractable   (+) Nonintractable generalized 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 (HCC)   (+) Numbness and tingling      Neurology/Sleep   (+) Chiari I malformation (HCC)      Rheumatology   (+) Acute appendicitis with localized peritonitis without perforation      FEN/Gastrointestinal   (+) Candida esophagitis (HCC)      Other   (+) Diarrhea   (+) Obesity, Class I, BMI 30-34.9        Physical Exam    Airway    Mallampati score: II  TM Distance: >3 FB  Neck ROM: full     Dental       Cardiovascular      Pulmonary      Other Findings  post-pubertal.      Anesthesia Plan  ASA Score- 3 Emergent    Anesthesia Type- general with ASA Monitors.         Additional Monitors:     Airway Plan: ETT.           Plan Factors-Exercise tolerance (METS): >4 METS.    Chart reviewed. EKG reviewed. Imaging results reviewed. Existing labs reviewed. Patient summary reviewed.    Patient is not a current smoker.  Patient did not smoke on day of surgery.    Obstructive sleep apnea risk education given perioperatively.        Induction- intravenous and rapid sequence induction.    Postoperative Plan-     Informed Consent- Anesthetic plan and risks discussed with patient.  I personally reviewed this patient with the CRNA. Discussed and agreed on the Anesthesia Plan with the CRNA..

## 2024-04-25 NOTE — TELEPHONE ENCOUNTER
Patients GI provider:  Dr. Gilman    Number to return call: [phone]    Reason for call: Pt calling about abdominal pain. Transferred to [facility] in triage for further assistance.    Scheduled procedure/appointment date if applicable: N/A

## 2024-04-25 NOTE — TELEPHONE ENCOUNTER
"  Last OV:4/9/24 SIBO, GERD  Last Colonoscopy:2/25/21  Normal colon and terminal ileum. Biopsy taken to rule out microscopic colitis. Two small polyps removed from transverse colon. Small internal hemorrhoids.   Last EGD:2/25/21  Mild esophagitis in the distal esophagus brushing obtained to rule out Candida esophagitis. Random biopsy taken from esophagus to rule out eosinophilic esophagitis. Mild gastritis biopsy taken to rule out H pylori infection. Normal duodenum biopsy taken to rule out celiac disease.     Labs: n/a  Imaging: n/a    Next OV: n/a    Pt. C/o abdominal pain below umbilical region, started yesterday, woke up with diarrhea with all day abdominal discomfort, pt. Fells pain is worse today, having chills , vomiting x 4, low temp of 99.7 F, decreased appetite, pt. Treated with tylenol, motrin and bentyl with no relief, pt. Has + SIBO but has not started therapy yet due to insurance, advised pt. To go to ER if symptoms get worse, please advise further         Reason for Disposition   SEVERE abdominal pain (e.g., excruciating)    Answer Assessment - Initial Assessment Questions  1. LOCATION: \"Where does it hurt?\"       Mid abdomen   2. RADIATION: \"Does the pain shoot anywhere else?\" (e.g., chest, back)      Below umbilical   3. ONSET: \"When did the pain begin?\" (e.g., minutes, hours or days ago)       Yesterday   4. SUDDEN: \"Gradual or sudden onset?\"      Gradual   5. PATTERN \"Does the pain come and go, or is it constant?\"     - If constant: \"Is it getting better, staying the same, or worsening?\"       (Note: Constant means the pain never goes away completely; most serious pain is constant and it progresses)      - If intermittent: \"How long does it last?\" \"Do you have pain now?\"      (Note: Intermittent means the pain goes away completely between bouts)      Denies   6. SEVERITY: \"How bad is the pain?\"  (e.g., Scale 1-10; mild, moderate, or severe)    - MILD (1-3): doesn't interfere with normal " "activities, abdomen soft and not tender to touch     - MODERATE (4-7): interferes with normal activities or awakens from sleep, tender to touch     - SEVERE (8-10): excruciating pain, doubled over, unable to do any normal activities       9/10  7. RECURRENT SYMPTOM: \"Have you ever had this type of stomach pain before?\" If Yes, ask: \"When was the last time?\" and \"What happened that time?\"       No   8. CAUSE: \"What do you think is causing the stomach pain?\"      Unsure   9. RELIEVING/AGGRAVATING FACTORS: \"What makes it better or worse?\" (e.g., movement, antacids, bowel movement)      Laying down   10. OTHER SYMPTOMS: \"Has there been any vomiting, diarrhea, constipation, or urine problems?\"        Vomiting, diarrhea   11. PREGNANCY: \"Is there any chance you are pregnant?\" \"When was your last menstrual period?\"        Denies    Protocols used: Abdominal Pain - Female-ADULT-[place]    "

## 2024-04-25 NOTE — OP NOTE
OPERATIVE REPORT  PATIENT NAME: Renetta Rendon    :  1975  MRN: [id number]  Pt Location: [facility] OR ROOM 02    SURGERY DATE: 2024    Surgeons and Role:     * Per Allen MD - Primary     * Shira Hernandez PA-C - Assisting  The PA was necessary to provide expert assistance; i.e. in the form of providing optimal exposure with retraction, suturing, and assistance with dissection in order to perform the most efficient operation and in order to optimize patient safety in the abscence of a qualified surgical resident.    Preop Diagnosis:  Appendicitis [K37]    Post-Op Diagnosis Codes:     * Acute appendicitis with localized peritonitis [K35.30]    Procedure(s):  APPENDECTOMY LAPAROSCOPIC    Specimen(s):  ID Type Source Tests Collected by Time Destination   1 : appendix Tissue Appendix TISSUE EXAM Per Allen MD 2024 1829        Estimated Blood Loss:   Minimal    Drains:  [REMOVED] Urethral Catheter Latex 16 Fr. (Removed)   Number of days: 0       Anesthesia Type:   General    Operative Indications:  Appendicitis [K37]  The patient is a pleasant 48 year old female presenting with signs and symptoms of acute appendicitis for which a laparoscopic appendectomy is now indicated.    Operative Findings:  At the time of laparoscopy, 4 quadrants of the abdomen were inspected. The right upper quadrant was normal.  The left upper quadrant was normal.  The left lower quadrant was normal.  The right lower quadrant harbor an acutely inflamed appendix.    A laparoscopic appendectomy performed in the routine fashion after which the right lower quadrant wound inspected.  The good hemostasis was found and the procedure completed uneventfully.    Complications:   None    Procedure and Technique:  The patient was taken to the operating room where they were properly identified monitored and anesthetized. They received antibiotics perioperatively. Venodyne's used for DVT prophylaxis. Ivey catheter placed  preoperatively. Abdomen prepped and draped under sterile conditions using aseptic technique. Timeout performed. Skin incised at the umbilicus. Peritoneal cavity entered bluntly with a Janine clamp. 12 mm trocar inserted and pneumoperitoneum created to 15 mmHg. 5 mm 30° scope advanced.  4 quadrants of the abdomen and inspected laparoscopically as well as the pelvis. Patient placed in steep Trendelenburg, left side down. 2 additional working ports placed. These were 5 mm ports in the hypogastrium and right upper quadrant. A lysis of adhesions was performed. The acutely inflamed appendix exposed. It was grasped. Window made in the mesoappendix. The mesoappendix divided with the Endo SARY stapler. The base of the appendix controlled with the Endo SARY stapler. The appendix placed in an Endobag and delivered through the umbilical trocar site. Pneumoperitoneum reestablished. The right lower quadrant wound irrigated and aspirated as was the pelvis. All pooled liquid aspirated out of the right upper quadrant. The patient flattened. Procedure completed with closure of the fascial defect at the umbilicus with a figure-of-eight suture of 0 Vicryl. Skin closed with subcuticular 4-0 Monocryl suture. Wounds infiltrated with half percent Marcaine and dressed sterilely. The patient extubated and taken to recovery in stable condition. Patient tolerated the procedure well.     I was present for the entire procedure.    Patient Disposition:  PACU     This procedure was not performed to treat colon cancer through resection      SIGNATURE: Per Allen MD  DATE: April 25, 2024  TIME: 6:53 PM

## 2024-04-25 NOTE — H&P
"H&P - General Surgery   Renetta Rendon 48 y.o. female MRN: [id number]  Unit/Bed#: ED 22 Encounter: [id number]  Assessment:  48 year old female with PMH significant for migraines, seizures, SIBO presents for the evaluation of an acute appendicitis  Afebrile, tachycardic, remainder of vitals stable   Meets sepsis criteria with leukocytosis and tachycardia in the setting of an acute appy  WBC 19.79  Hgb 12.7  Bandemia of 12  Na 132  K 3.3  Cr 0.80  CTAP: \"Acute appendicitis. Mild reactive thickening of the cecum suggested. Some fluid is noted within the right colon, correlate for diarrheal illness. Nonobstructing bilateral renal calculi.\"  Abdomen soft, nondistended, TTP in suprapubic/periumbilical/RLQ area    Plan:  Admit to general surgery service   Plan for laparoscopic appendectomy today. Procedure, risks and benefits reviewed with patient. Written consent obtained  NPO   IV Zosyn   DVT prophylaxis   Analgesia and antiemetics prn   Evaluated at bedside with Dr. Allen     History of Present Illness   Chief Complaint: abdominal pain     HPI:  Renetta Rendon is a 48 y.o. female with past medical history significant for migraines, seizures, small intestine bacterial overgrowth, interstitial cystitis who initially presented to [facility] ED with complaints of abdominal pain. Patient states she started to feel unwell yesterday. Reports abdominal pain that just wouldn't go away. She reports last night she developed chills, rigors, nausea, vomiting and diarrhea. Patient also complains of loss of appetite. Thus, prompting her presentation to the ER. History of two c-sections. She denies blood thinners.     Review of Systems   Constitutional:  Positive for appetite change and chills. Negative for fever.   HENT:  Negative for congestion and sore throat.    Respiratory:  Negative for cough and shortness of breath.    Cardiovascular:  Negative for chest pain and leg swelling.   Gastrointestinal:  Positive for abdominal " "pain, diarrhea, nausea and vomiting.   Endocrine: Negative for polydipsia and polyuria.   Genitourinary:  Negative for difficulty urinating and dysuria.   Musculoskeletal:  Positive for back pain. Negative for gait problem.   Skin:  Negative for color change and wound.   Neurological:  Negative for dizziness and headaches.       Historical Information   Past Medical History:   Diagnosis Date    Interstitial cystitis     Migraines     Seizures (HCC)      Past Surgical History:   Procedure Laterality Date     SECTION      FL GUIDED NEEDLE PLAC BX/ASP/INJ  5/15/2023    UPPER GASTROINTESTINAL ENDOSCOPY       Social History   Social History     Substance and Sexual Activity   Alcohol Use Not Currently    Comment: social     Social History     Substance and Sexual Activity   Drug Use Never     Social History     Tobacco Use   Smoking Status Never   Smokeless Tobacco Never     E-Cigarette/Vaping    E-Cigarette Use Never User      E-Cigarette/Vaping Substances    Nicotine No     THC No     CBD No     Flavoring No     Other No     Unknown No      Family History: non-contributory    Meds/Allergies   all medications and allergies reviewed  Allergies   Allergen Reactions    Tetracycline Rash     Other reaction(s): Other (See Comments)  throat closes       Objective   First Vitals:   Blood Pressure: 132/62 (24 1416)  Pulse: (!) 108 (24 1415)  Temperature: 98.7 °F (37.1 °C) (24 1416)  Temp Source: Oral (24 1416)  Respirations: 20 (24 1415)  Height: 5' 6\" (167.6 cm) (24 1421)  Weight - Scale: 94.3 kg (208 lb) (24 1421)  SpO2: 97 % (24 1415)    Current Vitals:   Blood Pressure: 112/56 (24 1607)  Pulse: (!) 108 (24 1415)  Temperature: 99.7 °F (37.6 °C) (24 1607)  Temp Source: Temporal (24 1607)  Respirations: 20 (24 1415)  Height: 5' 6\" (167.6 cm) (24 1421)  Weight - Scale: 94.3 kg (208 lb) (24 1421)  SpO2: 97 % (24 1415)    No " intake or output data in the 24 hours ending 04/25/24 1622    Invasive Devices       None                   Physical Exam  Vitals reviewed.   Constitutional:       General: She is not in acute distress.     Appearance: She is obese. She is not ill-appearing.   HENT:      Head: Normocephalic and atraumatic.   Cardiovascular:      Rate and Rhythm: Normal rate and regular rhythm.   Pulmonary:      Effort: Pulmonary effort is normal. No respiratory distress.   Abdominal:      General: Abdomen is flat. There is no distension.      Palpations: Abdomen is soft.      Tenderness: There is abdominal tenderness in the periumbilical area and suprapubic area. There is no guarding.   Musculoskeletal:      Right lower leg: No edema.      Left lower leg: No edema.   Skin:     General: Skin is warm and dry.   Neurological:      General: No focal deficit present.      Mental Status: She is alert. Mental status is at baseline.   Psychiatric:         Mood and Affect: Mood normal.         Behavior: Behavior normal.         Lab Results: I have personally reviewed pertinent lab results.  , CBC:   Lab Results   Component Value Date    WBC 19.79 (H) 04/25/2024    HGB 12.7 04/25/2024    HCT 38.4 04/25/2024    MCV 93 04/25/2024     04/25/2024    RBC 4.14 04/25/2024    MCH 30.7 04/25/2024    MCHC 33.1 04/25/2024    RDW 12.6 04/25/2024    MPV 9.1 04/25/2024   , CMP:   Lab Results   Component Value Date    SODIUM 132 (L) 04/25/2024    K 3.3 (L) 04/25/2024     04/25/2024    CO2 18 (L) 04/25/2024    BUN 12 04/25/2024    CREATININE 0.80 04/25/2024    CALCIUM 8.8 04/25/2024    AST 18 04/25/2024    ALT 12 04/25/2024    ALKPHOS 99 04/25/2024    EGFR 88 04/25/2024     Imaging: I have personally reviewed pertinent reports.    EKG, Pathology, and Other Studies: I have personally reviewed pertinent reports.      Code Status: No Order  Advance Directive and Living Will:      Power of :    POLST:      Counseling / Coordination of  Care  Total floor / unit time spent today 25 minutes.  Greater than 50% of total time was spent with the patient and / or family counseling and / or coordination of care.  A description of the counseling / coordination of care: evaluation of patient, review of diagnostic and laboratory studies and discussion with attending.       Shira Hernandez PA-C

## 2024-04-26 LAB
ANION GAP SERPL CALCULATED.3IONS-SCNC: 11 MMOL/L (ref 4–13)
ANION GAP SERPL CALCULATED.3IONS-SCNC: 7 MMOL/L (ref 4–13)
ATRIAL RATE: 97 BPM
BASOPHILS # BLD AUTO: 0.03 THOUSANDS/ÂΜL (ref 0–0.1)
BASOPHILS NFR BLD AUTO: 0 % (ref 0–1)
BUN SERPL-MCNC: 10 MG/DL (ref 5–25)
BUN SERPL-MCNC: 11 MG/DL (ref 5–25)
CALCIUM SERPL-MCNC: 8.3 MG/DL (ref 8.4–10.2)
CALCIUM SERPL-MCNC: 8.6 MG/DL (ref 8.4–10.2)
CHLORIDE SERPL-SCNC: 108 MMOL/L (ref 96–108)
CHLORIDE SERPL-SCNC: 108 MMOL/L (ref 96–108)
CO2 SERPL-SCNC: 17 MMOL/L (ref 21–32)
CO2 SERPL-SCNC: 20 MMOL/L (ref 21–32)
CREAT SERPL-MCNC: 0.72 MG/DL (ref 0.6–1.3)
CREAT SERPL-MCNC: 0.72 MG/DL (ref 0.6–1.3)
EOSINOPHIL # BLD AUTO: 0.04 THOUSAND/ÂΜL (ref 0–0.61)
EOSINOPHIL NFR BLD AUTO: 0 % (ref 0–6)
ERYTHROCYTE [DISTWIDTH] IN BLOOD BY AUTOMATED COUNT: 13 % (ref 11.6–15.1)
ERYTHROCYTE [DISTWIDTH] IN BLOOD BY AUTOMATED COUNT: 13.1 % (ref 11.6–15.1)
GFR SERPL CREATININE-BSD FRML MDRD: 100 ML/MIN/1.73SQ M
GFR SERPL CREATININE-BSD FRML MDRD: 100 ML/MIN/1.73SQ M
GLUCOSE P FAST SERPL-MCNC: 138 MG/DL (ref 65–99)
GLUCOSE SERPL-MCNC: 109 MG/DL (ref 65–140)
GLUCOSE SERPL-MCNC: 138 MG/DL (ref 65–140)
HCT VFR BLD AUTO: 32.3 % (ref 34.8–46.1)
HCT VFR BLD AUTO: 35.3 % (ref 34.8–46.1)
HGB BLD-MCNC: 10.6 G/DL (ref 11.5–15.4)
HGB BLD-MCNC: 11.3 G/DL (ref 11.5–15.4)
IMM GRANULOCYTES # BLD AUTO: 0.07 THOUSAND/UL (ref 0–0.2)
IMM GRANULOCYTES NFR BLD AUTO: 1 % (ref 0–2)
LYMPHOCYTES # BLD AUTO: 1.96 THOUSANDS/ÂΜL (ref 0.6–4.47)
LYMPHOCYTES NFR BLD AUTO: 19 % (ref 14–44)
MAGNESIUM SERPL-MCNC: 1.9 MG/DL (ref 1.9–2.7)
MAGNESIUM SERPL-MCNC: 1.9 MG/DL (ref 1.9–2.7)
MCH RBC QN AUTO: 30.3 PG (ref 26.8–34.3)
MCH RBC QN AUTO: 31.1 PG (ref 26.8–34.3)
MCHC RBC AUTO-ENTMCNC: 32 G/DL (ref 31.4–37.4)
MCHC RBC AUTO-ENTMCNC: 32.8 G/DL (ref 31.4–37.4)
MCV RBC AUTO: 95 FL (ref 82–98)
MCV RBC AUTO: 95 FL (ref 82–98)
MONOCYTES # BLD AUTO: 0.45 THOUSAND/ÂΜL (ref 0.17–1.22)
MONOCYTES NFR BLD AUTO: 4 % (ref 4–12)
NEUTROPHILS # BLD AUTO: 7.86 THOUSANDS/ÂΜL (ref 1.85–7.62)
NEUTS SEG NFR BLD AUTO: 76 % (ref 43–75)
NRBC BLD AUTO-RTO: 0 /100 WBCS
P AXIS: 52 DEGREES
PHOSPHATE SERPL-MCNC: 2.1 MG/DL (ref 2.7–4.5)
PHOSPHATE SERPL-MCNC: 3.4 MG/DL (ref 2.7–4.5)
PLATELET # BLD AUTO: 182 THOUSANDS/UL (ref 149–390)
PLATELET # BLD AUTO: 206 THOUSANDS/UL (ref 149–390)
PMV BLD AUTO: 9 FL (ref 8.9–12.7)
PMV BLD AUTO: 9.7 FL (ref 8.9–12.7)
POTASSIUM SERPL-SCNC: 3.3 MMOL/L (ref 3.5–5.3)
POTASSIUM SERPL-SCNC: 3.4 MMOL/L (ref 3.5–5.3)
PR INTERVAL: 142 MS
QRS AXIS: 82 DEGREES
QRSD INTERVAL: 74 MS
QT INTERVAL: 332 MS
QTC INTERVAL: 421 MS
RBC # BLD AUTO: 3.41 MILLION/UL (ref 3.81–5.12)
RBC # BLD AUTO: 3.73 MILLION/UL (ref 3.81–5.12)
SODIUM SERPL-SCNC: 135 MMOL/L (ref 135–147)
SODIUM SERPL-SCNC: 136 MMOL/L (ref 135–147)
T WAVE AXIS: 52 DEGREES
VENTRICULAR RATE: 97 BPM
WBC # BLD AUTO: 10.41 THOUSAND/UL (ref 4.31–10.16)
WBC # BLD AUTO: 12.36 THOUSAND/UL (ref 4.31–10.16)

## 2024-04-26 PROCEDURE — 80048 BASIC METABOLIC PNL TOTAL CA: CPT

## 2024-04-26 PROCEDURE — 84100 ASSAY OF PHOSPHORUS: CPT

## 2024-04-26 PROCEDURE — 85025 COMPLETE CBC W/AUTO DIFF WBC: CPT

## 2024-04-26 PROCEDURE — 83735 ASSAY OF MAGNESIUM: CPT

## 2024-04-26 PROCEDURE — 99024 POSTOP FOLLOW-UP VISIT: CPT | Performed by: SURGERY

## 2024-04-26 PROCEDURE — 93010 ELECTROCARDIOGRAM REPORT: CPT | Performed by: INTERNAL MEDICINE

## 2024-04-26 PROCEDURE — 85027 COMPLETE CBC AUTOMATED: CPT

## 2024-04-26 RX ORDER — ACETAMINOPHEN 10 MG/ML
1000 INJECTION, SOLUTION INTRAVENOUS EVERY 6 HOURS SCHEDULED
Status: DISCONTINUED | OUTPATIENT
Start: 2024-04-26 | End: 2024-04-27 | Stop reason: HOSPADM

## 2024-04-26 RX ORDER — POTASSIUM CHLORIDE 20 MEQ/1
40 TABLET, EXTENDED RELEASE ORAL ONCE
Status: COMPLETED | OUTPATIENT
Start: 2024-04-26 | End: 2024-04-26

## 2024-04-26 RX ADMIN — PHENAZOPYRIDINE 100 MG: 100 TABLET ORAL at 08:04

## 2024-04-26 RX ADMIN — HEPARIN SODIUM 5000 UNITS: 5000 INJECTION, SOLUTION INTRAVENOUS; SUBCUTANEOUS at 05:00

## 2024-04-26 RX ADMIN — HEPARIN SODIUM 5000 UNITS: 5000 INJECTION, SOLUTION INTRAVENOUS; SUBCUTANEOUS at 21:42

## 2024-04-26 RX ADMIN — OXYCODONE HYDROCHLORIDE 5 MG: 5 TABLET ORAL at 08:05

## 2024-04-26 RX ADMIN — PHENAZOPYRIDINE 100 MG: 100 TABLET ORAL at 11:39

## 2024-04-26 RX ADMIN — Medication 4.5 G: at 17:33

## 2024-04-26 RX ADMIN — ACETAMINOPHEN 650 MG: 325 TABLET ORAL at 15:46

## 2024-04-26 RX ADMIN — OXYCODONE HYDROCHLORIDE 5 MG: 5 TABLET ORAL at 13:02

## 2024-04-26 RX ADMIN — OXYCODONE HYDROCHLORIDE 5 MG: 5 TABLET ORAL at 03:13

## 2024-04-26 RX ADMIN — HEPARIN SODIUM 5000 UNITS: 5000 INJECTION, SOLUTION INTRAVENOUS; SUBCUTANEOUS at 13:02

## 2024-04-26 RX ADMIN — POTASSIUM CHLORIDE 40 MEQ: 1500 TABLET, EXTENDED RELEASE ORAL at 16:07

## 2024-04-26 RX ADMIN — Medication 4.5 G: at 01:16

## 2024-04-26 RX ADMIN — SODIUM CHLORIDE 125 ML/HR: 0.9 INJECTION, SOLUTION INTRAVENOUS at 14:59

## 2024-04-26 RX ADMIN — ACETAMINOPHEN 1000 MG: 10 INJECTION INTRAVENOUS at 21:43

## 2024-04-26 RX ADMIN — POTASSIUM PHOSPHATE, MONOBASIC AND POTASSIUM PHOSPHATE, DIBASIC 21 MMOL: 224; 236 INJECTION, SOLUTION, CONCENTRATE INTRAVENOUS at 22:05

## 2024-04-26 RX ADMIN — SERTRALINE HYDROCHLORIDE 125 MG: 25 TABLET ORAL at 08:04

## 2024-04-26 RX ADMIN — PHENAZOPYRIDINE 100 MG: 100 TABLET ORAL at 16:07

## 2024-04-26 RX ADMIN — ACETAMINOPHEN 650 MG: 325 TABLET ORAL at 09:24

## 2024-04-26 RX ADMIN — KETOROLAC TROMETHAMINE 15 MG: 30 INJECTION, SOLUTION INTRAMUSCULAR; INTRAVENOUS at 17:05

## 2024-04-26 RX ADMIN — TOPIRAMATE 100 MG: 100 TABLET, FILM COATED ORAL at 08:05

## 2024-04-26 RX ADMIN — SODIUM CHLORIDE 1000 ML: 0.9 INJECTION, SOLUTION INTRAVENOUS at 14:59

## 2024-04-26 RX ADMIN — Medication 4.5 G: at 09:22

## 2024-04-26 RX ADMIN — OXYCODONE HYDROCHLORIDE 5 MG: 5 TABLET ORAL at 22:05

## 2024-04-26 RX ADMIN — ONDANSETRON 4 MG: 2 INJECTION INTRAMUSCULAR; INTRAVENOUS at 08:06

## 2024-04-26 RX ADMIN — KETOROLAC TROMETHAMINE 15 MG: 30 INJECTION, SOLUTION INTRAMUSCULAR; INTRAVENOUS at 05:00

## 2024-04-26 RX ADMIN — ONDANSETRON 4 MG: 2 INJECTION INTRAMUSCULAR; INTRAVENOUS at 17:31

## 2024-04-26 RX ADMIN — KETOROLAC TROMETHAMINE 15 MG: 30 INJECTION, SOLUTION INTRAMUSCULAR; INTRAVENOUS at 11:38

## 2024-04-26 NOTE — PLAN OF CARE

## 2024-04-26 NOTE — UTILIZATION REVIEW
Initial Clinical Review    Admission: Date/Time/Statement:   Admission Orders (From admission, onward)       Ordered        04/25/24 1620  Place in Observation  Once                          Orders Placed This Encounter   Procedures    Place in Observation     Standing Status:   Standing     Number of Occurrences:   1     Order Specific Question:   Level of Care     Answer:   Med Surg [16]     ED Arrival Information       Expected   -    Arrival   4/25/2024 14:08    Acuity   Urgent              Means of arrival   Walk-In    Escorted by   Self    Service   Surgery-General    Admission type   Emergency              Arrival complaint   abdominal pain             Chief Complaint   Patient presents with    Abdominal Pain     X2 days.  With diarrhea. Pain described as stabbing pain at umbilicus now radiating to LLQ        Initial Presentation: 48 y.o. female with past medical history significant for migraines, seizures, small intestine bacterial overgrowth, interstitial cystitis who initially presented to [facility] ED with complaints of abdominal pain. Patient states she started to feel unwell yesterday. Reports abdominal pain that just wouldn't go away. She reports last night she developed chills, rigors, nausea, vomiting and diarrhea. Patient also complains of loss of appetite. Thus, prompting her presentation to the ER. Plan: Observation for acute appendicitis: plan for laparoscopic appendectomy today, NPO, IV Zosyn, IV fluids.     Procedure(s):  APPENDECTOMY LAPAROSCOPIC    4/26:    Surgery: continue IV Zosyn, IV fluids, surgical soft diet, analgesia and antiemetics.     ED Triage Vitals   Temperature Pulse Respirations Blood Pressure SpO2   04/25/24 1416 04/25/24 1415 04/25/24 1415 04/25/24 1416 04/25/24 1415   98.7 °F (37.1 °C) (!) 108 20 132/62 97 %      Temp Source Heart Rate Source Patient Position - Orthostatic VS BP Location FiO2 (%)   04/25/24 1416 04/25/24 1737 04/25/24 1416 04/25/24 1416 --   Oral Monitor Sitting  Left arm       Pain Score       04/25/24 1421       8          Wt Readings from Last 1 Encounters:   04/25/24 94.3 kg (208 lb)     Additional Vital Signs:     Date/Time Temp Pulse Resp BP MAP (mmHg) SpO2 Calculated FIO2 (%) - Nasal Cannula O2 Flow Rate (L/min) Nasal Cannula O2 Flow Rate (L/min) O2 Device   04/26/24 14:44:30 97.8 °F (36.6 °C) 79 19 99/60 73 91 % -- -- -- None (Room air)   04/26/24 13:06:13 -- 77 14 96/59 71 94 % -- -- -- --   04/26/24 10:53:34 97.4 °F (36.3 °C) Abnormal  76 18 100/60 73 91 % -- -- -- --   04/26/24 10:18:20 98 °F (36.7 °C) 71 15 -- -- 93 % -- -- -- --   04/26/24 07:51:44 -- 73 15 98/55 69 93 % -- -- -- --   04/26/24 07:44:13 97.9 °F (36.6 °C) 79 19 81/60 Abnormal  67 93 % -- -- -- None (Room air)   04/26/24 04:51:33 98.1 °F (36.7 °C) 68 18 109/56 74 95 % -- -- -- --   04/25/24 22:28:30 99 °F (37.2 °C) 83 20 116/62 80 92 % -- -- -- --   04/25/24 21:03:31 97.9 °F (36.6 °C) 78 20 118/60 79 95 % -- -- -- None (Room air)   04/25/24 20:48:43 98 °F (36.7 °C) 83 20 95/55 68 95 % -- -- -- None (Room air)   04/25/24 20:19:16 98.4 °F (36.9 °C) 87 20 109/63 78 93 % 24 -- 1 L/min Nasal cannula   04/25/24 19:51:05 97.8 °F (36.6 °C) 95 20 116/68 84 94 % 24 -- 1 L/min Nasal cannula   04/25/24 1935 -- 97 16 95/54 73 95 % 28 -- 2 L/min Nasal cannula   04/25/24 1930 -- 99 16 101/51 73 95 % -- -- -- --   04/25/24 1925 -- 100 16 101/51 66 94 % 28 -- 2 L/min Nasal cannula   04/25/24 1920 -- 102 16 93/52 69 95 % -- -- -- --   04/25/24 1915 -- 101 16 91/55 71 99 % -- -- -- --   04/25/24 1910 -- 104 16 83/46 Abnormal  59 Abnormal  99 % -- -- -- --   04/25/24 1905 100 °F (37.8 °C) 107 Abnormal  16 81/43 Abnormal  -- 99 % -- 3 L/min -- Simple mask   04/25/24 1737 100.7 °F (38.2 °C) Abnormal  94 20 111/54 -- 96 % -- -- -- None (Room air)   04/25/24 1607 99.7 °F (37.6 °C) -- -- 112/56 80 -- -- -- -- --   04/25/24 1416 98.7 °F (37.1 °C) -- -- 132/62 89 -- -- -- -- --     Pertinent Labs/Diagnostic Test Results:    CT abdomen pelvis with contrast   Final Result by Randal Adams DO (04/25 1624)      Acute appendicitis.      Mild reactive thickening of the cecum suggested. Some fluid is noted within the right colon, correlate for diarrheal illness.      Nonobstructing bilateral renal calculi.      I personally discussed this study with Chandrakant Gamble   on 4/25/2024 at 4:17 PM.               Workstation performed: [id number]           Results from last 7 days   Lab Units 04/25/24  1605   SARS-COV-2  Negative     Results from last 7 days   Lab Units 04/26/24  1439 04/26/24  0425 04/25/24  1453   WBC Thousand/uL 10.41* 12.36* 19.79*   HEMOGLOBIN g/dL 10.6* 11.3* 12.7   HEMATOCRIT % 32.3* 35.3 38.4   PLATELETS Thousands/uL 182 206 234   TOTAL NEUT ABS Thousands/µL 7.86*  --   --    BANDS PCT %  --   --  12*         Results from last 7 days   Lab Units 04/26/24 1439 04/26/24  0425 04/25/24  1453   SODIUM mmol/L 135 136 132*   POTASSIUM mmol/L 3.3* 3.4* 3.3*   CHLORIDE mmol/L 108 108 103   CO2 mmol/L 20* 17* 18*   ANION GAP mmol/L 7 11 11   BUN mg/dL 11 10 12   CREATININE mg/dL 0.72 0.72 0.80   EGFR ml/min/1.73sq m 100 100 88   CALCIUM mg/dL 8.3* 8.6 8.8   MAGNESIUM mg/dL 1.9 1.9 1.5*   PHOSPHORUS mg/dL 2.1* 3.4  --      Results from last 7 days   Lab Units 04/25/24  1453   AST U/L 18   ALT U/L 12   ALK PHOS U/L 99   TOTAL PROTEIN g/dL 7.2   ALBUMIN g/dL 3.6   TOTAL BILIRUBIN mg/dL 0.46         Results from last 7 days   Lab Units 04/26/24  1439 04/26/24  0425 04/25/24  1453   GLUCOSE RANDOM mg/dL 109 138 143*           Results from last 7 days   Lab Units 04/25/24 2039 04/25/24  1604 04/25/24  1453   HS TNI 0HR ng/L  --   --  5   HS TNI 2HR ng/L  --  5  --    HSTNI D2 ng/L  --  0  --    HS TNI 4HR ng/L 4  --   --    HSTNI D4 ng/L -1  --   --          Results from last 7 days   Lab Units 04/25/24  1634   PROTIME seconds 14.4   INR  1.11   PTT seconds 32         Results from last 7 days   Lab Units 04/25/24  1620    PROCALCITONIN ng/ml 25.67*     Results from last 7 days   Lab Units 04/25/24  2039 04/25/24  1620   LACTIC ACID mmol/L 0.9 2.3*               Results from last 7 days   Lab Units 04/25/24  1453   LIPASE u/L 8*           Results from last 7 days   Lab Units 04/25/24  1605   CLARITY UA  Clear   COLOR UA  Yellow   SPEC GRAV UA  <=1.005*   PH UA  6.5   GLUCOSE UA mg/dl Negative   KETONES UA mg/dl Negative   BLOOD UA  Negative   PROTEIN UA mg/dl Negative   NITRITE UA  Negative   BILIRUBIN UA  Negative   UROBILINOGEN UA E.U./dl 0.2   LEUKOCYTES UA  Negative     Results from last 7 days   Lab Units 04/25/24  1605   INFLUENZA A PCR  Negative   INFLUENZA B PCR  Negative   RSV PCR  Negative           Results from last 7 days   Lab Units 04/25/24  1620 04/25/24  1607   BLOOD CULTURE  Received in Microbiology Lab. Culture in Progress. Received in Microbiology Lab. Culture in Progress.         ED Treatment:   Medication Administration from 04/25/2024 1408 to 04/25/2024 1736         Date/Time Order Dose Route Action     04/25/2024 1445 EDT sodium chloride 0.9 % bolus 1,000 mL 1,000 mL Intravenous New Bag     04/25/2024 1442 EDT ondansetron (ZOFRAN) injection 4 mg 4 mg Intravenous Given     04/25/2024 1634 EDT morphine injection 4 mg 4 mg Intravenous Given     04/25/2024 1635 EDT ketorolac (TORADOL) injection 15 mg 15 mg Intravenous Given     04/25/2024 1544 EDT iohexol (OMNIPAQUE) 350 MG/ML injection (MULTI-DOSE) 100 mL 100 mL Intravenous Given          Past Medical History:   Diagnosis Date    Interstitial cystitis     Migraines     Seizures (HCC)      Present on Admission:   Acute appendicitis with localized peritonitis without perforation   Diarrhea   Nonintractable generalized 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 (HCC)      Admitting Diagnosis: Appendicitis [K37]  Abdominal pain [R10.9]  Age/Sex: 48 y.o. female  Admission Orders:  Scheduled Medications:  heparin (porcine), 5,000 Units, Subcutaneous, Q8H  EMILE  ketorolac, 15 mg, Intravenous, Q6H EMILE  phenazopyridine, 100 mg, Oral, TID With Meals  piperacillin-tazobactam, 4.5 g, Intravenous, Q8H  sertraline, 125 mg, Oral, Daily  sodium chloride, 1,000 mL, Intravenous, Once      Continuous IV Infusions:  sodium chloride, 125 mL/hr, Intravenous, Continuous      PRN Meds:  acetaminophen, 650 mg, Oral, Q6H PRN  HYDROmorphone, 0.2 mg, Intravenous, Q4H PRN  ondansetron, 4 mg, Intravenous, Q6H PRN  oxyCODONE, 5 mg, Oral, Q4H PRN  SUMAtriptan-naproxen, 1 tablet, Oral, Once PRN        None    Network Utilization Review Department  ATTENTION: Please call with any questions or concerns to [phone] and carefully listen to the prompts so that you are directed to the right person. All voicemails are confidential.   For Discharge needs, contact Care Management DC Support Team at [phone] opt. [phone]  Send all requests for admission clinical reviews, approved or denied determinations and any other requests to dedicated fax number below belonging to the campus where the patient is receiving treatment. List of dedicated fax numbers for the Facilities:  FACILITY NAME UR FAX NUMBER   ADMISSION DENIALS (Administrative/Medical Necessity) [phone]   DISCHARGE SUPPORT TEAM (NETWORK) [phone]   PARENT CHILD HEALTH (Maternity/NICU/Pediatrics) [phone]   [facility] [phone]   [facility] [phone]   [facility] [phone]   [facility] [phone]   Critical access hospital [phone]   [facility] [phone]   General acute hospital [phone]   [facility] [phone]   [facility] [phone]   [facility] [phone]   [facility] [phone]   [facility]  [place] [phone]

## 2024-04-26 NOTE — CASE MANAGEMENT
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 2023    Obesity, Class I, BMI 30-34.9 2022    BOR syndrome 2021    Chiari I malformation (HCC) 2021    Candida esophagitis (HCC) 2021    Depression 2021    Psychophysiological insomnia 2020    Nonintractable generalized 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 (HCC) 2020    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    Laryngopharyngeal reflux (LPR) 2019    Numbness and tingling 2019    Migraine with aura and without status migrainosus, not intractable 2019    Muscle spasm 2018    IC (interstitial cystitis) 2018    Pelvic pain in female 2018    Anxiety 2017    Non morbid obesity due to excess calories 2017      LOS (days): 0  Geometric Mean LOS (GMLOS) (days):   Days to GMLOS:     OBJECTIVE:            Current admission status: Observation   Preferred Pharmacy:   [facility] [place] [place] [address]  [place] [address]  Phone: [phone] Fax: [phone]    [facility]  [place] [address]  [facility],  [address]  [facility],  First Floor [facility] [place] [address]  Phone: [phone] Fax:  [phone]    [id number] - [place] - [address]  [address]  [facility] [address]  Phone: [phone] Fax: [phone]    Primary Care Provider: Emily Pagan DO    Primary Insurance: BLUE CROSS  Secondary Insurance:     DISCHARGE DETAILS:     Additional Comments: Chart reviewed for discharge planning. Pt IPTA. Pt has no identified CM discharge needs at this time. Pt will return home with family on discharge. CM to follow as needed.

## 2024-04-26 NOTE — PROGRESS NOTES
"Progress Note - General Surgery   Renetta Rendon 48 y.o. female MRN: [id number]  Unit/Bed#: -Sally Encounter: [id number]    Assessment:  48 year old female with PMH significant for migraines, seizures, SIBO presents POD#1 s/p laparoscopic appendectomy   Afebrile, episodes of hypotension   WBC 12.36 (19)  Hgb 11.3  Cr 0.72  Abdomen soft, nondistended, TTP in RLQ    Plan:  Surgically stable, will re-evaluate later this afternoon for potential discharge  Continue IV Zosyn   Continue IV fluids  Surgical soft diet   Analgesia and antiemetics prn   Discussed with Dr. Montes     Subjective/Objective   Subjective: Patient reports moderate pain in her RLQ. Also reports nausea, but no vomiting. Ate a little of her breakfast this morning. Urinating without difficulty. Reports not passing gas and no bowel function.     Objective:   Blood pressure 98/55, pulse 73, temperature 97.9 °F (36.6 °C), temperature source Oral, resp. rate 15, height 5' 6\" (1.676 m), weight 94.3 kg (208 lb), last menstrual period 03/25/2024, SpO2 93%, not currently breastfeeding.,Body mass index is 33.57 kg/m².      Intake/Output Summary (Last 24 hours) at 4/26/2024 0910  Last data filed at 4/26/2024 0841  Gross per 24 hour   Intake 2470 ml   Output 350 ml   Net 2120 ml       Invasive Devices       Peripheral Intravenous Line  Duration             Peripheral IV 04/25/24 Left;Dorsal (posterior) Hand <1 day    Peripheral IV 04/25/24 Right Antecubital <1 day                    Physical Exam  Vitals reviewed.   Constitutional:       General: She is not in acute distress.     Appearance: She is obese. She is not ill-appearing.   HENT:      Head: Normocephalic and atraumatic.   Cardiovascular:      Rate and Rhythm: Normal rate and regular rhythm.   Pulmonary:      Effort: Pulmonary effort is normal. No respiratory distress.   Abdominal:      General: Abdomen is flat. There is no distension.      Palpations: Abdomen is soft.      Tenderness: There is " abdominal tenderness. There is no guarding.      Comments: Incisions clean, dry and intact with surgical glue   Musculoskeletal:      Right lower leg: No edema.      Left lower leg: No edema.   Skin:     General: Skin is warm and dry.   Neurological:      General: No focal deficit present.      Mental Status: She is alert. Mental status is at baseline.   Psychiatric:         Mood and Affect: Mood normal.         Behavior: Behavior normal.          Lab, Imaging and other studies:I have personally reviewed pertinent lab results.  , CBC:   Lab Results   Component Value Date    WBC 12.36 (H) 04/26/2024    HGB 11.3 (L) 04/26/2024    HCT 35.3 04/26/2024    MCV 95 04/26/2024     04/26/2024    RBC 3.73 (L) 04/26/2024    MCH 30.3 04/26/2024    MCHC 32.0 04/26/2024    RDW 13.0 04/26/2024    MPV 9.7 04/26/2024   , CMP:   Lab Results   Component Value Date    SODIUM 136 04/26/2024    K 3.4 (L) 04/26/2024     04/26/2024    CO2 17 (L) 04/26/2024    BUN 10 04/26/2024    CREATININE 0.72 04/26/2024    CALCIUM 8.6 04/26/2024    AST 18 04/25/2024    ALT 12 04/25/2024    ALKPHOS 99 04/25/2024    EGFR 100 04/26/2024     VTE Pharmacologic Prophylaxis: Heparin  VTE Mechanical Prophylaxis: sequential compression device    Shira Hernandez PA-C

## 2024-04-27 VITALS
WEIGHT: 208 LBS | DIASTOLIC BLOOD PRESSURE: 63 MMHG | HEART RATE: 74 BPM | BODY MASS INDEX: 33.43 KG/M2 | OXYGEN SATURATION: 90 % | RESPIRATION RATE: 17 BRPM | HEIGHT: 66 IN | TEMPERATURE: 98 F | SYSTOLIC BLOOD PRESSURE: 116 MMHG

## 2024-04-27 LAB
ANION GAP SERPL CALCULATED.3IONS-SCNC: 7 MMOL/L (ref 4–13)
BASOPHILS # BLD AUTO: 0.04 THOUSANDS/ÂΜL (ref 0–0.1)
BASOPHILS NFR BLD AUTO: 1 % (ref 0–1)
BUN SERPL-MCNC: 10 MG/DL (ref 5–25)
CALCIUM SERPL-MCNC: 8.3 MG/DL (ref 8.4–10.2)
CHLORIDE SERPL-SCNC: 112 MMOL/L (ref 96–108)
CO2 SERPL-SCNC: 17 MMOL/L (ref 21–32)
CREAT SERPL-MCNC: 0.75 MG/DL (ref 0.6–1.3)
EOSINOPHIL # BLD AUTO: 0.09 THOUSAND/ÂΜL (ref 0–0.61)
EOSINOPHIL NFR BLD AUTO: 1 % (ref 0–6)
ERYTHROCYTE [DISTWIDTH] IN BLOOD BY AUTOMATED COUNT: 13.2 % (ref 11.6–15.1)
GFR SERPL CREATININE-BSD FRML MDRD: 95 ML/MIN/1.73SQ M
GLUCOSE SERPL-MCNC: 90 MG/DL (ref 65–140)
HCT VFR BLD AUTO: 32.8 % (ref 34.8–46.1)
HGB BLD-MCNC: 10.4 G/DL (ref 11.5–15.4)
IMM GRANULOCYTES # BLD AUTO: 0.05 THOUSAND/UL (ref 0–0.2)
IMM GRANULOCYTES NFR BLD AUTO: 1 % (ref 0–2)
LYMPHOCYTES # BLD AUTO: 3.16 THOUSANDS/ÂΜL (ref 0.6–4.47)
LYMPHOCYTES NFR BLD AUTO: 36 % (ref 14–44)
MCH RBC QN AUTO: 30.4 PG (ref 26.8–34.3)
MCHC RBC AUTO-ENTMCNC: 31.7 G/DL (ref 31.4–37.4)
MCV RBC AUTO: 96 FL (ref 82–98)
MONOCYTES # BLD AUTO: 0.31 THOUSAND/ÂΜL (ref 0.17–1.22)
MONOCYTES NFR BLD AUTO: 4 % (ref 4–12)
NEUTROPHILS # BLD AUTO: 5.04 THOUSANDS/ÂΜL (ref 1.85–7.62)
NEUTS SEG NFR BLD AUTO: 57 % (ref 43–75)
NRBC BLD AUTO-RTO: 0 /100 WBCS
PLATELET # BLD AUTO: 186 THOUSANDS/UL (ref 149–390)
PMV BLD AUTO: 9.3 FL (ref 8.9–12.7)
POTASSIUM SERPL-SCNC: 3.7 MMOL/L (ref 3.5–5.3)
RBC # BLD AUTO: 3.42 MILLION/UL (ref 3.81–5.12)
SODIUM SERPL-SCNC: 136 MMOL/L (ref 135–147)
WBC # BLD AUTO: 8.69 THOUSAND/UL (ref 4.31–10.16)

## 2024-04-27 PROCEDURE — NC001 PR NO CHARGE: Performed by: PHYSICIAN ASSISTANT

## 2024-04-27 PROCEDURE — 80048 BASIC METABOLIC PNL TOTAL CA: CPT

## 2024-04-27 PROCEDURE — 85025 COMPLETE CBC W/AUTO DIFF WBC: CPT

## 2024-04-27 PROCEDURE — 99024 POSTOP FOLLOW-UP VISIT: CPT | Performed by: PHYSICIAN ASSISTANT

## 2024-04-27 RX ORDER — TOPIRAMATE 25 MG/1
50 TABLET ORAL
Status: DISCONTINUED | OUTPATIENT
Start: 2024-04-27 | End: 2024-04-27 | Stop reason: HOSPADM

## 2024-04-27 RX ORDER — OXYCODONE HYDROCHLORIDE 5 MG/1
5 TABLET ORAL EVERY 4 HOURS PRN
Qty: 6 TABLET | Refills: 0 | Status: SHIPPED | OUTPATIENT
Start: 2024-04-27

## 2024-04-27 RX ORDER — TOPIRAMATE 100 MG/1
100 TABLET, FILM COATED ORAL EVERY 12 HOURS SCHEDULED
Status: DISCONTINUED | OUTPATIENT
Start: 2024-04-27 | End: 2024-04-27 | Stop reason: HOSPADM

## 2024-04-27 RX ORDER — POTASSIUM CHLORIDE 20 MEQ/1
40 TABLET, EXTENDED RELEASE ORAL ONCE
Status: COMPLETED | OUTPATIENT
Start: 2024-04-27 | End: 2024-04-27

## 2024-04-27 RX ADMIN — TOPIRAMATE 100 MG: 25 TABLET, FILM COATED ORAL at 02:09

## 2024-04-27 RX ADMIN — KETOROLAC TROMETHAMINE 15 MG: 30 INJECTION, SOLUTION INTRAMUSCULAR; INTRAVENOUS at 00:34

## 2024-04-27 RX ADMIN — PHENAZOPYRIDINE 100 MG: 100 TABLET ORAL at 09:06

## 2024-04-27 RX ADMIN — KETOROLAC TROMETHAMINE 15 MG: 30 INJECTION, SOLUTION INTRAMUSCULAR; INTRAVENOUS at 05:40

## 2024-04-27 RX ADMIN — SERTRALINE HYDROCHLORIDE 125 MG: 25 TABLET ORAL at 09:06

## 2024-04-27 RX ADMIN — POTASSIUM CHLORIDE 40 MEQ: 1500 TABLET, EXTENDED RELEASE ORAL at 09:06

## 2024-04-27 RX ADMIN — HEPARIN SODIUM 5000 UNITS: 5000 INJECTION, SOLUTION INTRAVENOUS; SUBCUTANEOUS at 05:40

## 2024-04-27 RX ADMIN — Medication 4.5 G: at 02:09

## 2024-04-27 RX ADMIN — TOPIRAMATE 100 MG: 25 TABLET, FILM COATED ORAL at 09:22

## 2024-04-27 RX ADMIN — TOPIRAMATE 50 MG: 25 TABLET, FILM COATED ORAL at 02:10

## 2024-04-27 RX ADMIN — ACETAMINOPHEN 1000 MG: 10 INJECTION INTRAVENOUS at 09:06

## 2024-04-27 NOTE — DISCHARGE INSTR - AVS FIRST PAGE
DISCHARGE INSTRUCTIONS:  FOLLOW UP APPOINTMENT: Following discharge from the hospital call the office in 1-2 days to set up a post operative appointment to be seen in 2 weeks by Dr. Allen.    AFTER YOU LEAVE: Following discharge from the hospital, you may have some questions about your procedure, your activities or your general condition.  These instructions may answer some of your questions and help you adjust during the first few days following your operation.  You can expect to be sore and tender mostly around the incisions. This pain can last approximally 5 days and should gradually improve daily.          INCISION SITES:  - You may apply ice to the incisions to help with pain. Avoid heat as this may make skin glue tacky.  - It is normal to have some bruising, swelling or mild discoloration around the incision.  If increasing redness, pain, or thick green/yellow discharge develops, call our office immediately.   -Do not apply any creams, lotions, or ointments (including neosporin).  If you have dressings:  - You may remove the gauze dressing from your incisions 48 hours after surgery. Underneath this dressing is a tape like dressing called Steri Strips. Leave the steri strips in place for 5-7 days. They may fall off on their own, this is ok.  If you have surgical adhesive glue:  -The incisions are closed with dissolvable sutures underneath the skin and a surgical adhesive glue overlying the skin.  - Do not pick at the adhesive. It will naturally wear off with time.  -Do not place a heat to the incisions as this can make the glue tacky.    WOUND CARE:  -If you have steri-strip, leave them on, allow to fall off naturally.    -Avoid tight adhering, irritative clothing.  -You may gently shower, let water and soap run down and pat dry; no scrubbing the incision sites.   -No submersion in water (baths, hot tubs, or swimming) until cleared at follow up appointment.    PAIN CONTROL/MEDICATIONS:  -Recommend taking  over the counter pain medication such as acetaminophen (Tylenol), Aleve, OR ibuprofen (Motrin/Advil) first; read and follow labels. You may alternate Tylenol and Ibuprofen every 3 hours.  -Only use prescribed pain medications as needed and try to taper down use overtime. Do not drive or operate heavy machinery while on prescription pain medications. Do not take with alcohol.  - If you were given a prescription for Percocet, Norco, or Vicodin for pain be sure to eat prior to taking as these medications as they may cause nausea and vomiting on an empty stomach.    -DO NOT take Tylenol  (acetaminophen) with prescribed pain medication for a fever or for further pain control as these medications already contain Tylenol in them. Take one or the other.  Do not exceed more than 4000 mg of acetaminophen in 24 hours or 3000 mg if you have liver disease.   -You may apply ice at the incision site as needed for 20 minutes on/off to decrease pain/swelling the first few days.   - If you were given an antibiotic take it until it is finished.    DIET/LIFE HABITS:  -Advance diet as tolerated. Start with bland foods. Once tolerating normal diet, include fiber-rich foods such as fruits and green leafy vegetables to help with bowel movements.  -Stay hydrated. Drink plenty of non-caffienated, non-alcoholic beverages such as water, juices, popsicles, etc.  -Refrain from alcohol consumption the first few weeks as this can impair wound healing and increase the risk of unwanted bleeding.   -No smoking at least 2 weeks post surgery, this can also delay wound healing. Smoking cessation is encouraged to improve your overall health. We can provide you with options to facilitate cessation.  -If you are having constipation, ensure you are eating a high fiber diet, stay active, and if requiring more, you may take over the counter stool softners as needed.    ACTIVITY/RESTRICTIONS:  -No strenuous exercise and no heavy lifting, pulling, or pushing  >10-15 lbs x 4 weeks or until cleared by surgeon.  -The evening following the procedure you should rest as much as possible, sitting, lying or reclining.  You should be sure someone remains with you until the next morning.  Gradually increase your activity daily. Walking 3-4 times daily is good and stairs are ok.  Listen to your body.  If you start to get tired or sore then rest.   -No driving for 5 days or while taking narcotics for pain. You may begin to drive again once you can get in and out of a car comfortably and once off prescribed pain medication x 24 hours.       RETURN TO WORK:  -You may return to work or other activities as soon as your pain is controlled and you feel comfortable. For many people, this is 5 to 7 days after surgery. If your job requires heavy lifting you will need to be on light duty for 2-3 weeks.     CALL THE OFFICE IF/RETURN TO ED:  -Fevers/chills with uncontrolled temperature > 100.4 F.  -Increasing severe pain uncontrolled with pain medicine.  -Incision site is having thick, yellow drainage, increasing redness, is warm to the touch, or becoming increasingly tender.  -Any changes in overall rosalind such as: nausea/vomitting, fevers/chills, diarrhea/constipation, inability to urinate, chest pains, palpations, trouble breathing, coughing, etc.

## 2024-04-27 NOTE — CASE MANAGEMENT
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 2023    Obesity, Class I, BMI 30-34.9 2022    BOR syndrome 2021    Chiari I malformation (HCC) 2021    Candida esophagitis (HCC) 2021    Depression 2021    Psychophysiological insomnia 2020    Nonintractable generalized 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 (HCC) 2020    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    Laryngopharyngeal reflux (LPR) 2019    Numbness and tingling 2019    Migraine with aura and without status migrainosus, not intractable 2019    Muscle spasm 2018    IC (interstitial cystitis) 2018    Pelvic pain in female 2018    Anxiety 2017    Non morbid obesity due to excess calories 2017      LOS (days): 0  Geometric Mean LOS (GMLOS) (days):   Days to GMLOS:     OBJECTIVE:            Current admission status: Observation   Preferred Pharmacy:   [facility] [place] [place] [address]  [place] [address]  Phone: [phone] Fax: [phone]    [facility] [place]  [place] - [address]  [facility],  [address]  [facility],  First Floor [facility] [place] [address]  Phone: [phone] Fax:  [phone]    [id number] - [place] - [address]  [address]  [facility] [address]  Phone: [phone] Fax: [phone]    Primary Care Provider: Emily Pagan DO    Primary Insurance: BLUE CROSS  Secondary Insurance:     DISCHARGE DETAILS:    Discharge planning discussed with:: patietn & spouse was called at 9:27 am  Freedom of Choice: Yes  Comments - Freedom of Choice: pt denies any d/c needs   pt & spouse are in agreement with the d/c & d/c plan  CM contacted family/caregiver?: Yes  Were Treatment Team discharge recommendations reviewed with patient/caregiver?: Yes  Did patient/caregiver verbalize understanding of patient care needs?: Yes  Were patient/caregiver advised of the risks associated with not following Treatment Team discharge recommendations?: Yes    Contacts  Patient Contacts: Juliocesar Rendon  Relationship to Patient:: Family (spouse)  Contact Method: Phone  Phone Number: [phone]  Reason/Outcome: Discharge Planning    Requested Home Health Care         Is the patient interested in HHC at discharge?: No    DME Referral Provided  Referral made for DME?: No    Other Referral/Resources/Interventions Provided:  Referral Comments: pt & spouse deny any d/c needs- spouse has ordered a stool to help he get up on to their bed that has a high matress    Would you like to participate in our [facility] service program?  : No - Declined    Treatment Team Recommendation: Home (home with spouse & outpt follow up- spouse)  Discharge Destination Plan:: Home (home with spouse & outpt follow up)

## 2024-04-27 NOTE — DISCHARGE SUMMARY
Discharge Summary   Renetta Rendon 48 y.o. female MRN: [id number]  Unit/Bed#: -01 Encounter: [id number]  2024    Admission Date: 2024   Discharge Date: 2024  Length of Stay: 2 days  Attending: Per Allen MD  Primary Service: General Surgery     Admitting Diagnosis:   Appendicitis [K37]  Abdominal pain [R10.9]    Discharge Diagnoses:   Principal Problem:    Acute appendicitis with localized peritonitis without perforation  Active Problems:    Nonintractable generalized 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 (HCC)    Diarrhea    PMH/Secondary Diagnoses:  Past Medical History:   Diagnosis Date    Interstitial cystitis     Migraines     Seizures (HCC)      Past Surgical History:   Procedure Laterality Date    APPENDECTOMY LAPAROSCOPIC N/A 2024    Procedure: APPENDECTOMY LAPAROSCOPIC;  Surgeon: Per Allen MD;  Location: [facility];  Service: General     SECTION      FL GUIDED NEEDLE PLAC BX/ASP/INJ  5/15/2023    UPPER GASTROINTESTINAL ENDOSCOPY         Inpatient Medications:  Scheduled Meds:  Current Facility-Administered Medications   Medication Dose Route Frequency Provider Last Rate    acetaminophen  1,000 mg Intravenous Q6H [facility] Dayana Montes MD 1,000 mg (24 0906)    heparin (porcine)  5,000 Units Subcutaneous Q8H [facility] Shira Hernandez PA-C      HYDROmorphone  0.2 mg Intravenous Q4H PRN YOLI Lane-CARMITA      ketorolac  15 mg Intravenous Q6H [facility] YOLI Lane-CARMITA      ondansetron  4 mg Intravenous Q6H PRN YOLI Lane-CARMITA      oxyCODONE  5 mg Oral Q4H PRN YOLI Lane-CARMITA      phenazopyridine  100 mg Oral TID With Meals Israel Yip PA-C      piperacillin-tazobactam  4.5 g Intravenous Q8H Shira Hernandez PA-C 4.5 g (24 0209)    sertraline  125 mg Oral Daily Shira Hernandez PA-C      sodium chloride  125 mL/hr Intravenous Continuous Shira Hernandez PA-C 125 mL/hr (24 1459)    SUMAtriptan-naproxen  1 tablet Oral Once PRN Shira Hernandez PA-C    "   topiramate  100 mg Oral Q12H [facility] Israel Yip PA-C      topiramate  50 mg Oral HS Israel Yip PA-C       Continuous Infusions:sodium chloride, 125 mL/hr, Last Rate: 125 mL/hr (04/26/24 1459)      PRN Meds:  HYDROmorphone    ondansetron    oxyCODONE    SUMAtriptan-naproxen    Allergies:  Allergies   Allergen Reactions    Tetracycline Rash     Other reaction(s): Other (See Comments)  throat closes         Imaging Studies:  CT abdomen pelvis with contrast    Result Date: 4/25/2024  Impression: Acute appendicitis. Mild reactive thickening of the cecum suggested. Some fluid is noted within the right colon, correlate for diarrheal illness. Nonobstructing bilateral renal calculi. I personally discussed this study with Chandrakant Gamble on 4/25/2024 at 4:17 PM. Workstation performed: [id number]     Procedures/Surgeries Performed:  4/25/24 Laparoscopic Appendectomy   Dr. Allen   Op Findings: \"At the time of laparoscopy, 4 quadrants of the abdomen were inspected. The right upper quadrant was normal.  The left upper quadrant was normal.  The left lower quadrant was normal.  The right lower quadrant harbor an acutely inflamed appendix.A laparoscopic appendectomy performed in the routine fashion after which the right lower quadrant wound inspected.  The good hemostasis was found and the procedure completed uneventfully.\"       Consultants:   None      History of Present Illness: As per Shira Hernandez PA-C: \"Renetta Rendon is a 48 y.o. female with past medical history significant for migraines, seizures, small intestine bacterial overgrowth, interstitial cystitis who initially presented to [facility] ED with complaints of abdominal pain. Patient states she started to feel unwell yesterday. Reports abdominal pain that just wouldn't go away. She reports last night she developed chills, rigors, nausea, vomiting and diarrhea. Patient also complains of loss of appetite. Thus, prompting her presentation to the ER. History of " "two c-sections. She denies blood thinners.\"    Significant diagnostic findings in ED:  Leukocytosis 19.79 with bandemia 12  Na 132  K 3.3  CTAP: \"Acute appendicitis. Mild reactive thickening of the cecum suggested. Some fluid is noted within the right colon, correlate for diarrheal illness. Nonobstructing bilateral renal calculi.\"  She meet sepsis criteria with tachycardia, leukocytosis. Source: appendicitis      Summary of Hospital Course: Pt was admitted under the surgery service for management of acute appendicitis. She was made NPO and given IVF, IV abx, and prn analgesics/antiemetics. She was taken to the OR for laparoscopic appendectomy the same day. The operation of was successful with no acute complications. Post-operatively she had a few episodes of hypotension and associated symptoms of dizziness and orthostatic symptoms so she stayed one additional day for IV fluids and monitoring of vital signs. POD2 day of discharge her pressures improved, her wbc normalized, she was tolerating a regular diet, her pain was well controlled on oral analgesics, she was voiding, she had evidence of bowel function (passing gas), and she was oob ambulating. She was deemed stable for discharge home with the plan for outpatient follow up with general surgery in 1-2 weeks for a post op visit.     Complications: None      Plan Upon Discharge:  -OP FU with gen surg  -Script for oxycodone prn severe pain sent  -Remove dressing 48 hours post surgery       Physical Exam on Day of Discharge:   See progress note on 4/27/24    Condition at Discharge: stable       Discharge instructions/Information to patient and family:   See after visit summary for information provided to patient and family. All instructions were discussed and the patient was understanding. All questions answered.     Provisions for Follow-Up Care:  See after visit summary for information related to follow-up care and any pertinent home health orders.      PCP: Emily" DO Latasha    Disposition: Home    Planned Readmission: No    Discharge Statement   I spent 20 minutes discharging the patient. This time was spent on the day of discharge. I had direct contact with the patient on the day of discharge. Additional documentation is required if more than 30 minutes were spent on discharge.     Discharge Medications:  See after visit summary for reconciled discharge medications provided to patient and family.      Bibi Gleason PA-C   4/27/2024

## 2024-04-27 NOTE — NURSING NOTE
Went over discharge info with patient and with patients . All questions answered. Patient was taken to the main lobby via wheel chair.

## 2024-04-27 NOTE — PLAN OF CARE
Problem: SAFETY ADULT  Goal: Maintain or return to baseline ADL function  Description: INTERVENTIONS:  -  Assess patient's ability to carry out ADLs; assess patient's baseline for ADL function and identify physical deficits which impact ability to perform ADLs (bathing, care of mouth/teeth, toileting, grooming, dressing, etc.)  - Assess/evaluate cause of self-care deficits   - Assess range of motion  - Assess patient's mobility; develop plan if impaired  - Assess patient's need for assistive devices and provide as appropriate  - Encourage maximum independence but intervene and supervise when necessary  - Involve family in performance of ADLs  - Assess for home care needs following discharge   - Consider OT consult to assist with ADL evaluation and planning for discharge  - Provide patient education as appropriate  Outcome: Progressing   Patient ambulated to room by self and performed basic hygiene

## 2024-04-27 NOTE — PROGRESS NOTES
"Progress Note - General Surgery   Renetta Rendon 48 y.o. female MRN: [id number]  Unit/Bed#: -Sally Encounter: [id number]    ASSESSMENT:  48F w/ PMH migraines, seizures, SIBO HD#2.  POD2 s/p laparoscopic appendectomy for uncomplicated acute appendicitis.  -Postop had mild symptomatic hypotension. Stayed another 24 hrs for IV hydration and monitoring of vitals  -BP improved, 110s/60s. No orthostatic symptoms. Rest of VSS.  -WBC normalized, 8 (10,12)  -Blood cx NGTD  -, unmeasured occurances    PLAN:  -Stable for discharge home   -Will no need continuation of abx  -Will send script for oxycodone, prn severe pain. Went over plan for scheduled OTC Tylenol and Ibuprofen.   -Remove umbilical site dressing tonight. Ok to shower.   -Discussed all dc instructions and return to ED/call office parameters  -Work note provided      SUBJECTIVE:  Pain well controlled with analgesics. C/o some pain with deep inspiration. Denies SOB, cough, CP, calf pain, f/c. Tolerating diet. No n/v. Passing some gas. Had a BM.     OBJECTIVE:   Vitals:  Blood pressure 116/63, pulse 74, temperature 98 °F (36.7 °C), resp. rate 17, height 5' 6\" (1.676 m), weight 94.3 kg (208 lb), last menstrual period 03/25/2024, SpO2 90%, not currently breastfeeding.,Body mass index is 33.57 kg/m².    I/Os:    Intake/Output Summary (Last 24 hours) at 4/27/2024 0920  Last data filed at 4/26/2024 1726  Gross per 24 hour   Intake 240 ml   Output 350 ml   Net -110 ml       Lines/Drains:  Invasive Devices       Peripheral Intravenous Line  Duration             Peripheral IV 04/25/24 Left;Dorsal (posterior) Hand 1 day                    Physical Exam  Vitals reviewed.   Constitutional:       General: She is not in acute distress.     Appearance: She is not toxic-appearing.   Cardiovascular:      Rate and Rhythm: Normal rate and regular rhythm.      Heart sounds: No murmur heard.  Pulmonary:      Effort: Pulmonary effort is normal.      Breath sounds: No " wheezing, rhonchi or rales.   Abdominal:      General: Bowel sounds are normal. There is no distension.      Palpations: Abdomen is soft.      Tenderness: There is no abdominal tenderness. There is no guarding or rebound.   Musculoskeletal:         General: No tenderness.      Cervical back: Neck supple.      Right lower leg: No edema.      Left lower leg: No edema.   Skin:     General: Skin is warm and dry.   Neurological:      Mental Status: She is alert.         Diagnostics:  I have personally reviewed pertinent lab results.     CT abdomen pelvis with contrast    Result Date: 4/25/2024  Impression: Acute appendicitis. Mild reactive thickening of the cecum suggested. Some fluid is noted within the right colon, correlate for diarrheal illness. Nonobstructing bilateral renal calculi. I personally discussed this study with Chandrakant Gamble on 4/25/2024 at 4:17 PM. Workstation performed: [id number]     Recent Results (from the past 36 hour(s))   Basic metabolic panel    Collection Time: 04/26/24  4:25 AM   Result Value Ref Range    Sodium 136 135 - 147 mmol/L    Potassium 3.4 (L) 3.5 - 5.3 mmol/L    Chloride 108 96 - 108 mmol/L    CO2 17 (L) 21 - 32 mmol/L    ANION GAP 11 4 - 13 mmol/L    BUN 10 5 - 25 mg/dL    Creatinine 0.72 0.60 - 1.30 mg/dL    Glucose 138 65 - 140 mg/dL    Glucose, Fasting 138 (H) 65 - 99 mg/dL    Calcium 8.6 8.4 - 10.2 mg/dL    eGFR 100 ml/min/1.73sq m   Magnesium    Collection Time: 04/26/24  4:25 AM   Result Value Ref Range    Magnesium 1.9 1.9 - 2.7 mg/dL   Phosphorus    Collection Time: 04/26/24  4:25 AM   Result Value Ref Range    Phosphorus 3.4 2.7 - 4.5 mg/dL   CBC and differential    Collection Time: 04/26/24  4:25 AM   Result Value Ref Range    WBC 12.36 (H) 4.31 - 10.16 Thousand/uL    RBC 3.73 (L) 3.81 - 5.12 Million/uL    Hemoglobin 11.3 (L) 11.5 - 15.4 g/dL    Hematocrit 35.3 34.8 - 46.1 %    MCV 95 82 - 98 fL    MCH 30.3 26.8 - 34.3 pg    MCHC 32.0 31.4 - 37.4 g/dL    RDW 13.0  11.6 - 15.1 %    MPV 9.7 8.9 - 12.7 fL    Platelets 206 149 - 390 Thousands/uL   CBC and differential    Collection Time: 04/26/24  2:39 PM   Result Value Ref Range    WBC 10.41 (H) 4.31 - 10.16 Thousand/uL    RBC 3.41 (L) 3.81 - 5.12 Million/uL    Hemoglobin 10.6 (L) 11.5 - 15.4 g/dL    Hematocrit 32.3 (L) 34.8 - 46.1 %    MCV 95 82 - 98 fL    MCH 31.1 26.8 - 34.3 pg    MCHC 32.8 31.4 - 37.4 g/dL    RDW 13.1 11.6 - 15.1 %    MPV 9.0 8.9 - 12.7 fL    Platelets 182 149 - 390 Thousands/uL    nRBC 0 /100 WBCs    Segmented % 76 (H) 43 - 75 %    Immature Grans % 1 0 - 2 %    Lymphocytes % 19 14 - 44 %    Monocytes % 4 4 - 12 %    Eosinophils Relative 0 0 - 6 %    Basophils Relative 0 0 - 1 %    Absolute Neutrophils 7.86 (H) 1.85 - 7.62 Thousands/µL    Absolute Immature Grans 0.07 0.00 - 0.20 Thousand/uL    Absolute Lymphocytes 1.96 0.60 - 4.47 Thousands/µL    Absolute Monocytes 0.45 0.17 - 1.22 Thousand/µL    Eosinophils Absolute 0.04 0.00 - 0.61 Thousand/µL    Basophils Absolute 0.03 0.00 - 0.10 Thousands/µL   Magnesium    Collection Time: 04/26/24  2:39 PM   Result Value Ref Range    Magnesium 1.9 1.9 - 2.7 mg/dL   Phosphorus    Collection Time: 04/26/24  2:39 PM   Result Value Ref Range    Phosphorus 2.1 (L) 2.7 - 4.5 mg/dL   Basic metabolic panel    Collection Time: 04/26/24  2:39 PM   Result Value Ref Range    Sodium 135 135 - 147 mmol/L    Potassium 3.3 (L) 3.5 - 5.3 mmol/L    Chloride 108 96 - 108 mmol/L    CO2 20 (L) 21 - 32 mmol/L    ANION GAP 7 4 - 13 mmol/L    BUN 11 5 - 25 mg/dL    Creatinine 0.72 0.60 - 1.30 mg/dL    Glucose 109 65 - 140 mg/dL    Calcium 8.3 (L) 8.4 - 10.2 mg/dL    eGFR 100 ml/min/1.73sq m   CBC and differential    Collection Time: 04/27/24  5:40 AM   Result Value Ref Range    WBC 8.69 4.31 - 10.16 Thousand/uL    RBC 3.42 (L) 3.81 - 5.12 Million/uL    Hemoglobin 10.4 (L) 11.5 - 15.4 g/dL    Hematocrit 32.8 (L) 34.8 - 46.1 %    MCV 96 82 - 98 fL    MCH 30.4 26.8 - 34.3 pg    MCHC 31.7 31.4 -  37.4 g/dL    RDW 13.2 11.6 - 15.1 %    MPV 9.3 8.9 - 12.7 fL    Platelets 186 149 - 390 Thousands/uL    nRBC 0 /100 WBCs    Segmented % 57 43 - 75 %    Immature Grans % 1 0 - 2 %    Lymphocytes % 36 14 - 44 %    Monocytes % 4 4 - 12 %    Eosinophils Relative 1 0 - 6 %    Basophils Relative 1 0 - 1 %    Absolute Neutrophils 5.04 1.85 - 7.62 Thousands/µL    Absolute Immature Grans 0.05 0.00 - 0.20 Thousand/uL    Absolute Lymphocytes 3.16 0.60 - 4.47 Thousands/µL    Absolute Monocytes 0.31 0.17 - 1.22 Thousand/µL    Eosinophils Absolute 0.09 0.00 - 0.61 Thousand/µL    Basophils Absolute 0.04 0.00 - 0.10 Thousands/µL   Basic metabolic panel    Collection Time: 04/27/24  5:40 AM   Result Value Ref Range    Sodium 136 135 - 147 mmol/L    Potassium 3.7 3.5 - 5.3 mmol/L    Chloride 112 (H) 96 - 108 mmol/L    CO2 17 (L) 21 - 32 mmol/L    ANION GAP 7 4 - 13 mmol/L    BUN 10 5 - 25 mg/dL    Creatinine 0.75 0.60 - 1.30 mg/dL    Glucose 90 65 - 140 mg/dL    Calcium 8.3 (L) 8.4 - 10.2 mg/dL    eGFR 95 ml/min/1.73sq m       Current Medications:  Scheduled Meds:  Current Facility-Administered Medications   Medication Dose Route Frequency Provider Last Rate    acetaminophen  1,000 mg Intravenous Q6H [facility] Dayana Montes MD 1,000 mg (04/27/24 0906)    heparin (porcine)  5,000 Units Subcutaneous Q8H EMILE Hernandez PA-C      HYDROmorphone  0.2 mg Intravenous Q4H PRN Shira Hernandez PA-C      ketorolac  15 mg Intravenous Q6H EMILE Hernandez PA-C      ondansetron  4 mg Intravenous Q6H PRN Shira Hernandez PA-C      oxyCODONE  5 mg Oral Q4H PRN Shira Hernandez PA-C      phenazopyridine  100 mg Oral TID With Meals Israel Yip PA-C      piperacillin-tazobactam  4.5 g Intravenous Q8H YOLI Lane-C 4.5 g (04/27/24 0209)    sertraline  125 mg Oral Daily Shira Hernandez PA-C      sodium chloride  125 mL/hr Intravenous Continuous Shira Hernandez PA-C 125 mL/hr (04/26/24 1459)    SUMAtriptan-naproxen  1 tablet Oral Once PRN Shira Hernandez PA-C       topiramate  100 mg Oral Q12H [facility] Israel Yip PA-C      topiramate  50 mg Oral HS Israel Yip PA-C       Continuous Infusions:sodium chloride, 125 mL/hr, Last Rate: 125 mL/hr (04/26/24 1459)      PRN Meds:    HYDROmorphone    ondansetron    oxyCODONE    SUMAtriptan-naproxen      Bibi Gleason PA-C  4/27/2024

## 2024-04-29 ENCOUNTER — TELEPHONE (OUTPATIENT)
Age: 49
End: 2024-04-29

## 2024-04-29 PROCEDURE — 88304 TISSUE EXAM BY PATHOLOGIST: CPT | Performed by: SPECIALIST

## 2024-04-29 NOTE — TELEPHONE ENCOUNTER
Patient called to schedule a follow up appointment. Advise patient she had appointment already 05/10/2024 at 8:30 am.

## 2024-04-30 ENCOUNTER — TRANSITIONAL CARE MANAGEMENT (OUTPATIENT)
Dept: FAMILY MEDICINE CLINIC | Facility: CLINIC | Age: 49
End: 2024-04-30

## 2024-04-30 ENCOUNTER — OFFICE VISIT (OUTPATIENT)
Dept: FAMILY MEDICINE CLINIC | Facility: CLINIC | Age: 49
End: 2024-04-30
Payer: COMMERCIAL

## 2024-04-30 ENCOUNTER — TELEPHONE (OUTPATIENT)
Age: 49
End: 2024-04-30

## 2024-04-30 DIAGNOSIS — Z90.49 S/P LAPAROSCOPIC APPENDECTOMY: Primary | ICD-10-CM

## 2024-04-30 LAB
BACTERIA BLD CULT: NORMAL
BACTERIA BLD CULT: NORMAL

## 2024-04-30 PROCEDURE — 99495 TRANSJ CARE MGMT MOD F2F 14D: CPT | Performed by: FAMILY MEDICINE

## 2024-04-30 NOTE — PROGRESS NOTES
Virtual Regular Visit    Verification of patient location:    Patient is located at Home in the following state in which I hold an active license [place]      Assessment/Plan:    Problem List Items Addressed This Visit        Surgery/Wound/Pain    S/P laparoscopic appendectomy - Primary     Patient is seen for routine TCM follow-up.  Has not required antibiotic treatment.  Show signs of good healing.  Recommend follow-up with general surgery as scheduled.  Guidance is provided regarding return to activities and work.  She will discuss this further with surgery.    Recommend follow up in 6 weeks for annual physical or sooner as needed.                  Reason for visit is   Chief Complaint   Patient presents with   • Virtual Regular Visit          Encounter provider Emily Pagan DO      Recent Visits  Date Type Provider Dept   04/30/24 Office Visit Emily Pagan DO [facility]   Showing recent visits within past 7 days and meeting all other requirements  Future Appointments  No visits were found meeting these conditions.  Showing future appointments within next 150 days and meeting all other requirements       The patient was identified by name and date of birth. Renetta Rendon was informed that this is a telemedicine visit and that the visit is being conducted through the Epic Embedded platform. She agrees to proceed..  My office door was closed. No one else was in the room.  She acknowledged consent and understanding of privacy and security of the video platform. The patient has agreed to participate and understands they can discontinue the visit at any time.    Patient is aware this is a billable service.     Subjective  Renetta Rendon is a 48 y.o. female with past medical history of migraine, LPR, and epilepsy who presents today for TCM visit.      Patient was admitted on 4/25 6 through 4/27 at [facility] for acute appendicitis with localized peritonitis without perforation.   She is status post laparoscopic appendectomy on .  She has a scheduled follow-up with her general surgeon on 5/10.  She did have some mild symptomatic hypotension which delayed her discharge by 1 day.  She reports her  noted that she had a low blood pressure following her  as well and this may be a reaction to the anesthesia.  Notes her abdominal pain has been improving and that her incision sites are healing well.       TCM Call     Date and time call was made  2024 11:31 AM    Hospital care reviewed  Records reviewed    Patient was hospitialized at  [facility]    Date of Admission  24    Date of discharge  24    Diagnosis  Acute appendicitis with localized peritonitis without perforation    Disposition  Home    Were the patients medications reviewed and updated  No    Current Symptoms  None      TCM Call     Post hospital issues  None    Should patient be enrolled in anticoag monitoring?  No    Scheduled for follow up?  Yes    Did you obtain your prescribed medications  Yes    Do you need help managing your prescriptions or medications  No    Is transportation to your appointment needed  No    I have advised the patient to call PCP with any new or worsening symptoms  ena parra MA    Living Arrangements  Spouse or Significiant other    Support System  Spouse    The type of support provided  Emotional; Financial; Physical    Do you have social support  Yes, as much as I need    Are you recieving any outpatient services  No    Are you recieving home care services  No    Are you using any community resources  No    Current waiver services  No    Have you fallen in the last 12 months  No            Past Medical History:   Diagnosis Date   • Interstitial cystitis    • Migraines    • Seizures (HCC)        Past Surgical History:   Procedure Laterality Date   • APPENDECTOMY LAPAROSCOPIC N/A 2024    Procedure: APPENDECTOMY LAPAROSCOPIC;  Surgeon: Per Allen MD;   Location: [facility];  Service: General   •  SECTION     • [place] GUIDED NEEDLE PLAC BX/ASP/INJ  5/15/2023   • UPPER GASTROINTESTINAL ENDOSCOPY         Current Outpatient Medications   Medication Sig Dispense Refill   • dicyclomine (BENTYL) 20 mg tablet Take 1 tablet (20 mg total) by mouth every 6 (six) hours 90 tablet 1   • Junel 1/20 1-20 MG-MCG per tablet      • ketoconazole (NIZORAL) 2 % cream APPLY TO AFFECTED AREAS ON THE NECK AND EARS TWICE A DAY FOR 4 WEEKS THEN AS NEEDED     • ketoconazole (NIZORAL) 2 % shampoo SHAMPOO 2-3 TIMES WEEKLY     • magnesium gluconate (MAGONATE) 500 mg tablet Take 500 mg by mouth 2 (two) times a day     • norethindrone-ethinyl estradiol (Junel 1/20) 1-20 MG-MCG per tablet Take 1 tablet by mouth daily 28 tablet 3   • ondansetron (Zofran ODT) 4 mg disintegrating tablet Take 1 tablet (4 mg total) by mouth every 6 (six) hours as needed for nausea or vomiting 30 tablet 0   • oxyCODONE (Roxicodone) 5 immediate release tablet Take 1 tablet (5 mg total) by mouth every 4 (four) hours as needed for severe pain for up to 6 doses Max Daily Amount: 30 mg 6 tablet 0   • pantoprazole (PROTONIX) 40 mg tablet Take 1 tablet (40 mg total) by mouth 2 (two) times a day 60 tablet 0   • Riboflavin 400 MG TABS Take 1 tablet (400 mg total) by mouth in the morning 30 tablet 5   • sertraline (ZOLOFT) 50 mg tablet Take 2.5 tablets (125 mg total) by mouth daily 225 tablet 3   • SUMAtriptan-naproxen (TREXIMET)  MG per tablet Take 1 po at onset of migraine, may repeat x 1 in 24hrs 10 tablet 1   • topiramate (TOPAMAX) 100 mg tablet Take 1 tablet in the AM and 1.5 tabs  tablet 3     No current facility-administered medications for this visit.        Allergies   Allergen Reactions   • Tetracycline Rash     Other reaction(s): Other (See Comments)  throat closes       Review of Systems   Constitutional:  Negative for fever.   Gastrointestinal:  Positive for abdominal pain. Negative for constipation  and vomiting.   Genitourinary:  Negative for dysuria, frequency and hematuria.   Musculoskeletal:  Positive for myalgias.   Neurological:  Positive for headaches.       Video Exam    There were no vitals filed for this visit.    Physical Exam  Constitutional:       General: She is not in acute distress.     Appearance: Normal appearance. She is not ill-appearing.   HENT:      Head: Normocephalic.      Right Ear: External ear normal.      Left Ear: External ear normal.   Eyes:      General: No scleral icterus.        Right eye: No discharge.         Left eye: No discharge.      Conjunctiva/sclera: Conjunctivae normal.   Pulmonary:      Effort: Pulmonary effort is normal. No respiratory distress.   Musculoskeletal:      Cervical back: Normal range of motion.   Neurological:      Mental Status: She is alert.   Psychiatric:         Mood and Affect: Mood normal.         Behavior: Behavior normal.          Visit Time  Total Visit Duration: 15

## 2024-04-30 NOTE — PROGRESS NOTES
Family Medicine Follow-Up Office Visit  Renetta Rendon 48 y.o. female   MRN: [id number] : 1975  ENCOUNTER: 2024       Assessment and Plan   {There are no diagnoses linked to this encounter. (Refresh or delete this SmartLink)}         Chief Complaint   No chief complaint on file.      History of Present Illness   Renetta Rendon is a 48 y.o.-year-old female who presents today for ***        Review of Systems   Review of Systems    Active Problem List     Patient Active Problem List   Diagnosis   • Numbness and tingling   • Migraine with aura and without status migrainosus, not intractable   • Laryngopharyngeal reflux (LPR)   • Antineutrophil cytoplasmic antibody (ANCA) positive   • Psychophysiological insomnia   • Nonintractable generalized 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 (HCC)   • Anxiety   • Non morbid obesity due to excess calories   • Depression   • Candida esophagitis (HCC)   • Chiari I malformation (HCC)   • Pelvic pain in female   • Muscle spasm   • IC (interstitial cystitis)   • BOR syndrome   • Obesity, Class I, BMI 30-34.9   • Ankle arthritis   • Right foot pain   • Bunion of great toe of right foot   • Flat foot   • 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   • Major depressive disorder, single episode, mild (HCC)   • RUQ pain   • Acute appendicitis with localized peritonitis without perforation   • Diarrhea       Past Medical History, Past Surgical History, Family History, and Social History were reviewed and updated today as appropriate.    Objective   LMP 2024 (Approximate) Comment: preg neg    Physical Exam    Pertinent Laboratory/Diagnostic Studies:  Lab Results   Component Value Date    BUN 10 2024    CREATININE 0.75 2024    CALCIUM 8.3 (L) 2024    K 3.7 2024    CO2 17 (L) 2024     (H) 2024     Lab Results   Component Value Date    ALT 12 2024    AST 18 2024    ALKPHOS 99 2024       Lab  "Results   Component Value Date    WBC 8.69 04/27/2024    HGB 10.4 (L) 04/27/2024    HCT 32.8 (L) 04/27/2024    MCV 96 04/27/2024     04/27/2024       Lab Results   Component Value Date    TSH 1.31 06/21/2022       No results found for: \"CHOL\"  Lab Results   Component Value Date    TRIG 119 09/30/2023     Lab Results   Component Value Date    HDL 79 09/30/2023     Lab Results   Component Value Date    LDLCALC 108 (H) 09/30/2023     Lab Results   Component Value Date    HGBA1C 5.4 09/30/2023       Results for orders placed or performed during the hospital encounter of 04/25/24   Blood culture #1    Specimen: Arm, Right; Blood   Result Value Ref Range    Blood Culture No Growth After 4 Days.    Blood culture #2    Specimen: Arm, Left; Blood   Result Value Ref Range    Blood Culture No Growth After 4 Days.    FLU/RSV/COVID - if FLU/RSV clinically relevant    Specimen: Nose; Nares   Result Value Ref Range    SARS-CoV-2 Negative Negative    INFLUENZA A PCR Negative Negative    INFLUENZA B PCR Negative Negative    RSV PCR Negative Negative   CBC and differential   Result Value Ref Range    WBC 19.79 (H) 4.31 - 10.16 Thousand/uL    RBC 4.14 3.81 - 5.12 Million/uL    Hemoglobin 12.7 11.5 - 15.4 g/dL    Hematocrit 38.4 34.8 - 46.1 %    MCV 93 82 - 98 fL    MCH 30.7 26.8 - 34.3 pg    MCHC 33.1 31.4 - 37.4 g/dL    RDW 12.6 11.6 - 15.1 %    MPV 9.1 8.9 - 12.7 fL    Platelets 234 149 - 390 Thousands/uL   CMP   Result Value Ref Range    Sodium 132 (L) 135 - 147 mmol/L    Potassium 3.3 (L) 3.5 - 5.3 mmol/L    Chloride 103 96 - 108 mmol/L    CO2 18 (L) 21 - 32 mmol/L    ANION GAP 11 4 - 13 mmol/L    BUN 12 5 - 25 mg/dL    Creatinine 0.80 0.60 - 1.30 mg/dL    Glucose 143 (H) 65 - 140 mg/dL    Calcium 8.8 8.4 - 10.2 mg/dL    AST 18 13 - 39 U/L    ALT 12 7 - 52 U/L    Alkaline Phosphatase 99 34 - 104 U/L    Total Protein 7.2 6.4 - 8.4 g/dL    Albumin 3.6 3.5 - 5.0 g/dL    Total Bilirubin 0.46 0.20 - 1.00 mg/dL    eGFR 88 " "ml/min/1.73sq m   Lipase   Result Value Ref Range    Lipase 8 (L) 11 - 82 u/L   HS Troponin 0hr (reflex protocol)   Result Value Ref Range    hs TnI 0hr 5 \"Refer to ACS Flowchart\"- see link ng/L   hCG, qualitative pregnancy   Result Value Ref Range    Preg, Serum Negative Negative   Lactic acid, plasma (w/reflex if result > 2.0)   Result Value Ref Range    LACTIC ACID 2.3 (HH) 0.5 - 2.0 mmol/L   Procalcitonin   Result Value Ref Range    Procalcitonin 25.67 (H) <=0.25 ng/ml   APTT   Result Value Ref Range    PTT 32 23 - 37 seconds   Protime-INR   Result Value Ref Range    Protime 14.4 11.6 - 14.5 seconds    INR 1.11 0.84 - 1.19   UA w Reflex to Microscopic w Reflex to Culture    Specimen: Urine, Clean Catch   Result Value Ref Range    Color, UA Yellow Yellow, Straw    Clarity, UA Clear Hazy, Clear    Specific Gravity, UA <=1.005 (L) >1.005 - <1.030    pH, UA 6.5 5.0, 5.5, 6.0, 6.5, 7.0, 7.5    Leukocytes, UA Negative Negative    Nitrite, UA Negative Negative    Protein, UA Negative Negative, Interference- unable to analyze mg/dl    Glucose, UA Negative Negative mg/dl    Ketones, UA Negative Negative mg/dl    Urobilinogen, UA 0.2 0.2, 1.0 E.U./dl E.U./dl    Bilirubin, UA Negative Negative    Occult Blood, UA Negative Negative   HS Troponin I 2hr   Result Value Ref Range    hs TnI 2hr 5 \"Refer to ACS Flowchart\"- see link ng/L    Delta 2hr hsTnI 0 <20 ng/L   HS Troponin I 4hr   Result Value Ref Range    hs TnI 4hr 4 \"Refer to ACS Flowchart\"- see link ng/L    Delta 4hr hsTnI -1 <20 ng/L   Magnesium   Result Value Ref Range    Magnesium 1.5 (L) 1.9 - 2.7 mg/dL   Lactic acid 2 Hours   Result Value Ref Range    LACTIC ACID 0.9 0.5 - 2.0 mmol/L   Basic metabolic panel   Result Value Ref Range    Sodium 136 135 - 147 mmol/L    Potassium 3.4 (L) 3.5 - 5.3 mmol/L    Chloride 108 96 - 108 mmol/L    CO2 17 (L) 21 - 32 mmol/L    ANION GAP 11 4 - 13 mmol/L    BUN 10 5 - 25 mg/dL    Creatinine 0.72 0.60 - 1.30 mg/dL    Glucose 138 " 65 - 140 mg/dL    Glucose, Fasting 138 (H) 65 - 99 mg/dL    Calcium 8.6 8.4 - 10.2 mg/dL    eGFR 100 ml/min/1.73sq m   Magnesium   Result Value Ref Range    Magnesium 1.9 1.9 - 2.7 mg/dL   Phosphorus   Result Value Ref Range    Phosphorus 3.4 2.7 - 4.5 mg/dL   CBC and differential   Result Value Ref Range    WBC 12.36 (H) 4.31 - 10.16 Thousand/uL    RBC 3.73 (L) 3.81 - 5.12 Million/uL    Hemoglobin 11.3 (L) 11.5 - 15.4 g/dL    Hematocrit 35.3 34.8 - 46.1 %    MCV 95 82 - 98 fL    MCH 30.3 26.8 - 34.3 pg    MCHC 32.0 31.4 - 37.4 g/dL    RDW 13.0 11.6 - 15.1 %    MPV 9.7 8.9 - 12.7 fL    Platelets 206 149 - 390 Thousands/uL   CBC and differential   Result Value Ref Range    WBC 10.41 (H) 4.31 - 10.16 Thousand/uL    RBC 3.41 (L) 3.81 - 5.12 Million/uL    Hemoglobin 10.6 (L) 11.5 - 15.4 g/dL    Hematocrit 32.3 (L) 34.8 - 46.1 %    MCV 95 82 - 98 fL    MCH 31.1 26.8 - 34.3 pg    MCHC 32.8 31.4 - 37.4 g/dL    RDW 13.1 11.6 - 15.1 %    MPV 9.0 8.9 - 12.7 fL    Platelets 182 149 - 390 Thousands/uL    nRBC 0 /100 WBCs    Segmented % 76 (H) 43 - 75 %    Immature Grans % 1 0 - 2 %    Lymphocytes % 19 14 - 44 %    Monocytes % 4 4 - 12 %    Eosinophils Relative 0 0 - 6 %    Basophils Relative 0 0 - 1 %    Absolute Neutrophils 7.86 (H) 1.85 - 7.62 Thousands/µL    Absolute Immature Grans 0.07 0.00 - 0.20 Thousand/uL    Absolute Lymphocytes 1.96 0.60 - 4.47 Thousands/µL    Absolute Monocytes 0.45 0.17 - 1.22 Thousand/µL    Eosinophils Absolute 0.04 0.00 - 0.61 Thousand/µL    Basophils Absolute 0.03 0.00 - 0.10 Thousands/µL   Magnesium   Result Value Ref Range    Magnesium 1.9 1.9 - 2.7 mg/dL   Phosphorus   Result Value Ref Range    Phosphorus 2.1 (L) 2.7 - 4.5 mg/dL   Basic metabolic panel   Result Value Ref Range    Sodium 135 135 - 147 mmol/L    Potassium 3.3 (L) 3.5 - 5.3 mmol/L    Chloride 108 96 - 108 mmol/L    CO2 20 (L) 21 - 32 mmol/L    ANION GAP 7 4 - 13 mmol/L    BUN 11 5 - 25 mg/dL    Creatinine 0.72 0.60 - 1.30 mg/dL     Glucose 109 65 - 140 mg/dL    Calcium 8.3 (L) 8.4 - 10.2 mg/dL    eGFR 100 ml/min/1.73sq m   CBC and differential   Result Value Ref Range    WBC 8.69 4.31 - 10.16 Thousand/uL    RBC 3.42 (L) 3.81 - 5.12 Million/uL    Hemoglobin 10.4 (L) 11.5 - 15.4 g/dL    Hematocrit 32.8 (L) 34.8 - 46.1 %    MCV 96 82 - 98 fL    MCH 30.4 26.8 - 34.3 pg    MCHC 31.7 31.4 - 37.4 g/dL    RDW 13.2 11.6 - 15.1 %    MPV 9.3 8.9 - 12.7 fL    Platelets 186 149 - 390 Thousands/uL    nRBC 0 /100 WBCs    Segmented % 57 43 - 75 %    Immature Grans % 1 0 - 2 %    Lymphocytes % 36 14 - 44 %    Monocytes % 4 4 - 12 %    Eosinophils Relative 1 0 - 6 %    Basophils Relative 1 0 - 1 %    Absolute Neutrophils 5.04 1.85 - 7.62 Thousands/µL    Absolute Immature Grans 0.05 0.00 - 0.20 Thousand/uL    Absolute Lymphocytes 3.16 0.60 - 4.47 Thousands/µL    Absolute Monocytes 0.31 0.17 - 1.22 Thousand/µL    Eosinophils Absolute 0.09 0.00 - 0.61 Thousand/µL    Basophils Absolute 0.04 0.00 - 0.10 Thousands/µL   Basic metabolic panel   Result Value Ref Range    Sodium 136 135 - 147 mmol/L    Potassium 3.7 3.5 - 5.3 mmol/L    Chloride 112 (H) 96 - 108 mmol/L    CO2 17 (L) 21 - 32 mmol/L    ANION GAP 7 4 - 13 mmol/L    BUN 10 5 - 25 mg/dL    Creatinine 0.75 0.60 - 1.30 mg/dL    Glucose 90 65 - 140 mg/dL    Calcium 8.3 (L) 8.4 - 10.2 mg/dL    eGFR 95 ml/min/1.73sq m   ECG 12 lead   Result Value Ref Range    Ventricular Rate 97 BPM    Atrial Rate 97 BPM    NH Interval 142 ms    QRSD Interval 74 ms    QT Interval 332 ms    QTC Interval 421 ms    P Axis 52 degrees    QRS Axis 82 degrees    T Wave Axis 52 degrees   Tissue Exam   Result Value Ref Range    Case Report       Surgical Pathology Report                         Case: [id number]                                  Authorizing Provider:  Per Allen MD       Collected:           04/25/2024 1829              Ordering Location:     [facility] Received:            04/25/2024 6483                "                      Operating Room                                                               Pathologist:           Sarah Cash MD                                                     Specimen:    Appendix, appendix                                                                         Final Diagnosis       A. Appendix, appendectomy:   - Acute suppurative appendicitis and periappendicitis.      Additional Information       All reported additional testing was performed with appropriately reactive controls.  These tests were developed and their performance characteristics determined by [facility] or appropriate performing facility, though some tests may be performed on tissues which have not been validated for performance characteristics (such as staining performed on alcohol exposed cell blocks and decalcified tissues).  Results should be interpreted with caution and in the context of the patients’ clinical condition. These tests may not be cleared or approved by the U.S. Food and Drug Administration, though the FDA has determined that such clearance or approval is not necessary. These tests are used for clinical purposes and they should not be regarded as investigational or for research. This laboratory has been approved by CLIA 88, designated as a high-complexity laboratory and is qualified to perform these tests.      Gross Description       A. The specimen is received in formalin, labeled with the patient's name and hospital number, and is designated \" appendix.\"  It consists of an 11.5 cm in length, 0.9-1.4 cm in diameter vermiform appendix with a stapled margin (inked blue).  The serosa is tan-red to gray-tan, congested and smooth.  There is tan exudate at the distal tip.  There is a pinpoint transmural defect, 3.5 cm from the proximal margin.  There is abundant attached mesoappendix, measuring up to 3.5 cm in width.  Sectioning reveals a markedly dilated lumen filled with " hemorrhagic fecal material.  The mucosa is tan-red to red-purple and hemorrhagic.  The wall measures up to 0.1 cm thick.  Representative sections are submitted as follows:  1: Cross-sections, including en face proximal margin  2: Distal tip    Note: The estimated total formalin fixation time based upon information provided by the submitting clinician and the standard processing schedule is under 72 hours.    MScheib     Manual Differential(PHLEBS Do Not Order)   Result Value Ref Range    Segmented % 79 (H) 43 - 75 %    Bands % 12 (H) 0 - 8 %    Lymphocytes % 7 (L) 14 - 44 %    Monocytes % 2 (L) 4 - 12 %    Eosinophils % 0 0 - 6 %    Basophils % 0 0 - 1 %    Absolute Neutrophils 18.01 (H) 1.85 - 7.62 Thousand/uL    Absolute Lymphocytes 1.39 0.60 - 4.47 Thousand/uL    Absolute Monocytes 0.40 0.00 - 1.22 Thousand/uL    Absolute Eosinophils 0.00 0.00 - 0.40 Thousand/uL    Absolute Basophils 0.00 0.00 - 0.10 Thousand/uL    Total Counted      RBC Morphology Normal     Platelet Estimate Adequate Adequate       No orders of the defined types were placed in this encounter.        Current Medications     Current Outpatient Medications   Medication Sig Dispense Refill   • dicyclomine (BENTYL) 20 mg tablet Take 1 tablet (20 mg total) by mouth every 6 (six) hours 90 tablet 1   • Junel 1/20 1-20 MG-MCG per tablet      • ketoconazole (NIZORAL) 2 % cream APPLY TO AFFECTED AREAS ON THE NECK AND EARS TWICE A DAY FOR 4 WEEKS THEN AS NEEDED     • ketoconazole (NIZORAL) 2 % shampoo SHAMPOO 2-3 TIMES WEEKLY     • magnesium gluconate (MAGONATE) 500 mg tablet Take 500 mg by mouth 2 (two) times a day     • norethindrone-ethinyl estradiol (Junel 1/20) 1-20 MG-MCG per tablet Take 1 tablet by mouth daily 28 tablet 3   • ondansetron (Zofran ODT) 4 mg disintegrating tablet Take 1 tablet (4 mg total) by mouth every 6 (six) hours as needed for nausea or vomiting 30 tablet 0   • oxyCODONE (Roxicodone) 5 immediate release tablet Take 1 tablet (5 mg  total) by mouth every 4 (four) hours as needed for severe pain for up to 6 doses Max Daily Amount: 30 mg 6 tablet 0   • pantoprazole (PROTONIX) 40 mg tablet Take 1 tablet (40 mg total) by mouth 2 (two) times a day 60 tablet 0   • Riboflavin 400 MG TABS Take 1 tablet (400 mg total) by mouth in the morning 30 tablet 5   • sertraline (ZOLOFT) 50 mg tablet Take 2.5 tablets (125 mg total) by mouth daily 225 tablet 3   • SUMAtriptan-naproxen (TREXIMET)  MG per tablet Take 1 po at onset of migraine, may repeat x 1 in 24hrs 10 tablet 1   • topiramate (TOPAMAX) 100 mg tablet Take 1 tablet in the AM and 1.5 tabs  tablet 3     No current facility-administered medications for this visit.       ALLERGIES:  Allergies   Allergen Reactions   • Tetracycline Rash     Other reaction(s): Other (See Comments)  throat closes       Health Maintenance     Health Maintenance   Topic Date Due   • Depression Follow-up Plan  Never done   • Annual Physical  Never done   • COVID-19 Vaccine (4 - 2023-24 season) 09/01/2023   • HIV Screening  08/19/2024 (Originally 7/21/1990)   • Breast Cancer Screening: Mammogram  12/18/2024 (Originally 7/20/2024)   • Depression Screening  12/18/2024   • Colorectal Cancer Screening  02/25/2026   • Zoster Vaccine (1 of 2) 07/21/2026   • DTaP,Tdap,and Td Vaccines (2 - Td or Tdap) 03/05/2028   • Cervical Cancer Screening  04/03/2028   • Hepatitis C Screening  Completed   • Influenza Vaccine  Completed   • Pneumococcal Vaccine: Pediatrics (0 to 5 Years) and At-Risk Patients (6 to 64 Years)  Aged Out   • HIB Vaccine  Aged Out   • IPV Vaccine  Aged Out   • Hepatitis A Vaccine  Aged Out   • Meningococcal ACWY Vaccine  Aged Out   • HPV Vaccine  Aged Out     Immunization History   Administered Date(s) Administered   • COVID-19 MODERNA VACC 0.5 ML IM 02/10/2021, 03/10/2021, 12/13/2021   • INFLUENZA 11/21/2007, 03/29/2013, 10/10/2013, 03/06/2014, 10/01/2016, 11/07/2021   • Influenza Quadrivalent Preservative Free  3 years and older IM 09/30/2015, 10/11/2017, 11/14/2018, 09/17/2019   • Influenza Quadrivalent Recombinant Preservative Free IM 10/12/2020   • Influenza, seasonal, injectable 11/07/2021, 10/23/2022, 10/08/2023   • Tdap 03/05/2018     TCM Call     Date and time call was made  4/30/2024 11:31 AM    Hospital care reviewed  Records reviewed    Patient was hospitialized at  [facility]    Date of Admission  04/25/24    Date of discharge  04/27/24    Diagnosis  Acute appendicitis with localized peritonitis without perforation    Disposition  Home    Were the patients medications reviewed and updated  No    Current Symptoms  None      TCM Call     Post hospital issues  None    Should patient be enrolled in anticoag monitoring?  No    Scheduled for follow up?  Yes    Did you obtain your prescribed medications  Yes    Do you need help managing your prescriptions or medications  No    Is transportation to your appointment needed  No    I have advised the patient to call PCP with any new or worsening symptoms  ena parra MA    Living Arrangements  Spouse or Significiant other    Support System  Spouse    The type of support provided  Emotional; Financial; Physical    Do you have social support  Yes, as much as I need    Are you recieving any outpatient services  No    Are you recieving home care services  No    Are you using any community resources  No    Current waiver services  No    Have you fallen in the last 12 months  No            Emily Pagan DO   [facility]  4/30/2024  3:02 PM    Parts of this note were dictated using Dragon dictation software and may have sounds-like errors due to variation in pronunciation.

## 2024-05-01 PROBLEM — Z90.49 S/P LAPAROSCOPIC APPENDECTOMY: Status: ACTIVE | Noted: 2024-05-01

## 2024-05-01 NOTE — ASSESSMENT & PLAN NOTE
Patient is seen for routine TCM follow-up.  Has not required antibiotic treatment.  Show signs of good healing.  Recommend follow-up with general surgery as scheduled.  Guidance is provided regarding return to activities and work.  She will discuss this further with surgery.    Recommend follow up in 6 weeks for annual physical or sooner as needed.

## 2024-05-02 ENCOUNTER — NURSE TRIAGE (OUTPATIENT)
Age: 49
End: 2024-05-02

## 2024-05-02 NOTE — TELEPHONE ENCOUNTER
Patient of Dr. Allen.  Patient had appendectomy on 4/25.  Calling for information to see when she will know when ready to go back to work.  Advised patient to wait until she feels completely ready.  Explained to listen to what body is telling her.  Patient states still gets tired and not able to lift.  Patient will wait until at least next week.

## 2024-05-03 ENCOUNTER — TELEPHONE (OUTPATIENT)
Dept: SURGERY | Facility: CLINIC | Age: 49
End: 2024-05-03

## 2024-05-03 NOTE — TELEPHONE ENCOUNTER
Called and left a voicemail with contact information to review the results of the patient's pathology report.

## 2024-05-07 NOTE — ED PROCEDURE NOTE
Procedure  CriticalCare Time    Date/Time: 5/7/2024 5:49 PM    Performed by: ALFIE Wolfe  Authorized by: ALFIE Wolfe    Critical care provider statement:     Critical care time was exclusive of:  Separately billable procedures and treating other patients                   ALFIE Wolfe  05/07/24 6527

## 2024-05-09 NOTE — PROGRESS NOTES
Assessment/Plan:    Acute appendicitis with localized peritonitis without perforation  Patient returns for routine scheduled follow-up status post laparoscopic appendectomy for the definitive treatment of her acute appendicitis.    Patient reports feeling well.    On physical examination she has a benign abdominal examination.    The operative note and pathology report reviewed with the patient.  She has been encouraged to resume normal levels of activity.    She has been encouraged to follow with our practice at any point in future questions or concerns.       Diagnoses and all orders for this visit:    Acute appendicitis with localized peritonitis, without perforation, abscess, or gangrene          Subjective:      Patient ID: Renetta Rendon is a 48 y.o. female.    Patient is here for her po visit after having a lap appy on 4/25/24. Patient states her incisions are healing and denies any drainage, or redness/ irritation. Patient states she suffers from nausea after eating but has no issues with swallowing. Her inc are tender to touch and she has generalized abd aches. Her bowels are almost normal she hasn't been going how she usually does. Patient had some burning urination after sx but that has subsided but she still has vaginal discomfort. Patient denies vomiting, trouble eating/swallowing/drinking, or fever/chills. Tootie.BRENT ECKERT          Review of Systems   Constitutional:  Negative for chills and fever.   HENT:  Negative for ear pain and sore throat.    Eyes:  Negative for pain and visual disturbance.   Respiratory:  Negative for cough and shortness of breath.    Cardiovascular:  Negative for chest pain and palpitations.   Gastrointestinal:  Negative for abdominal pain and vomiting.   Genitourinary:  Negative for dysuria and hematuria.   Musculoskeletal:  Negative for arthralgias and back pain.   Skin:  Negative for color change and rash.   Neurological:  Negative for seizures and syncope.   All other  "systems reviewed and are negative.        Objective:      /78 (BP Location: Left arm, Patient Position: Sitting, Cuff Size: Standard)   Pulse 103   Temp 98.3 °F (36.8 °C) (Temporal)   Resp 18   Ht 5' 6\" (1.676 m)   Wt 97.5 kg (215 lb)   LMP 03/25/2024 (Approximate) Comment: preg neg  SpO2 98%   BMI 34.70 kg/m²          Physical Exam  Constitutional:       Appearance: She is well-developed.   HENT:      Head: Normocephalic and atraumatic.   Eyes:      Conjunctiva/sclera: Conjunctivae normal.      Pupils: Pupils are equal, round, and reactive to light.   Cardiovascular:      Rate and Rhythm: Normal rate and regular rhythm.   Pulmonary:      Effort: Pulmonary effort is normal.      Breath sounds: Normal breath sounds.   Abdominal:      General: Bowel sounds are normal.      Palpations: Abdomen is soft.   Musculoskeletal:         General: Normal range of motion.      Cervical back: Normal range of motion and neck supple.   Skin:     General: Skin is warm and dry.   Neurological:      Mental Status: She is alert and oriented to person, place, and time.   Psychiatric:         Behavior: Behavior normal.         Thought Content: Thought content normal.         Judgment: Judgment normal.           "

## 2024-05-10 ENCOUNTER — OFFICE VISIT (OUTPATIENT)
Dept: SURGERY | Facility: CLINIC | Age: 49
End: 2024-05-10

## 2024-05-10 VITALS
BODY MASS INDEX: 34.55 KG/M2 | OXYGEN SATURATION: 98 % | HEIGHT: 66 IN | WEIGHT: 215 LBS | TEMPERATURE: 98.3 F | HEART RATE: 103 BPM | RESPIRATION RATE: 18 BRPM | DIASTOLIC BLOOD PRESSURE: 78 MMHG | SYSTOLIC BLOOD PRESSURE: 124 MMHG

## 2024-05-10 DIAGNOSIS — K35.30 ACUTE APPENDICITIS WITH LOCALIZED PERITONITIS, WITHOUT PERFORATION, ABSCESS, OR GANGRENE: Primary | ICD-10-CM

## 2024-05-10 PROCEDURE — 99024 POSTOP FOLLOW-UP VISIT: CPT | Performed by: SURGERY

## 2024-05-10 NOTE — ASSESSMENT & PLAN NOTE
Patient returns for routine scheduled follow-up status post laparoscopic appendectomy for the definitive treatment of her acute appendicitis.    Patient reports feeling well.    On physical examination she has a benign abdominal examination.    The operative note and pathology report reviewed with the patient.  She has been encouraged to resume normal levels of activity.    She has been encouraged to follow with our practice at any point in future questions or concerns.

## 2024-06-15 DIAGNOSIS — G40.309 NONINTRACTABLE GENERALIZED IDIOPATHIC EPILEPSY WITHOUT STATUS EPILEPTICUS (HCC): ICD-10-CM

## 2024-06-17 RX ORDER — TOPIRAMATE 100 MG/1
TABLET, FILM COATED ORAL
Qty: 90 TABLET | Refills: 8 | Status: SHIPPED | OUTPATIENT
Start: 2024-06-17

## 2024-06-25 ENCOUNTER — PATIENT MESSAGE (OUTPATIENT)
Dept: FAMILY MEDICINE CLINIC | Facility: CLINIC | Age: 49
End: 2024-06-25

## 2024-06-26 ENCOUNTER — OFFICE VISIT (OUTPATIENT)
Dept: FAMILY MEDICINE CLINIC | Facility: CLINIC | Age: 49
End: 2024-06-26
Payer: COMMERCIAL

## 2024-06-26 VITALS
DIASTOLIC BLOOD PRESSURE: 58 MMHG | WEIGHT: 212 LBS | HEART RATE: 86 BPM | SYSTOLIC BLOOD PRESSURE: 119 MMHG | HEIGHT: 66 IN | TEMPERATURE: 97.5 F | OXYGEN SATURATION: 98 % | BODY MASS INDEX: 34.07 KG/M2

## 2024-06-26 DIAGNOSIS — M54.42 CHRONIC BILATERAL LOW BACK PAIN WITH LEFT-SIDED SCIATICA: ICD-10-CM

## 2024-06-26 DIAGNOSIS — E66.9 OBESITY, CLASS I, BMI 30-34.9: ICD-10-CM

## 2024-06-26 DIAGNOSIS — M79.662 PAIN OF LEFT CALF: Primary | ICD-10-CM

## 2024-06-26 DIAGNOSIS — G89.29 CHRONIC BILATERAL LOW BACK PAIN WITH LEFT-SIDED SCIATICA: ICD-10-CM

## 2024-06-26 PROCEDURE — 99214 OFFICE O/P EST MOD 30 MIN: CPT | Performed by: FAMILY MEDICINE

## 2024-06-26 NOTE — PROGRESS NOTES
Ambulatory Visit  Name: Renetta Rendon      : 1975      MRN: [id number]  Encounter Provider: Emily Pagan DO  Encounter Date: 2024   Encounter department: [facility]    Assessment & Plan   1. Pain of left calf  Assessment & Plan:  Patient with signs and symptoms of left calf pain exacerbated with palpation, without swelling, erythema, warmth.  Surgery was not very recent and travel was approximately 1 month ago not likely to affect risk for recurrent DVT.  Patient notes active lifestyle otherwise.    Suspect possible lumbar radiculopathy.  Recommend workup for back pain as noted.  Patient to call the office should symptoms persist through  would consider ultrasound and laboratory testing for further workup.  2. Chronic bilateral low back pain with left-sided sciatica  Assessment & Plan:  Patient with exacerbation of chronic low back pain.  Recommend x-ray lumbar spine for further evaluation.  Patient will trial OMT for conservative management.  Would consider lumbar MRI should symptoms persist over the next 6 weeks.  Orders:  -     XR spine lumbar minimum 4 views non injury; Future; Expected date: 2024  3. Obesity, Class I, BMI 30-34.9  Assessment & Plan:  Patient will continue with lifestyle management.  Discussed the benefits of whole plant food focused diet as well as physical activity 150 to 300 minutes/day.  Patient is not interested in weight loss medications at today's visit though she would meet criteria for these if interested.  Will continue discussion at follow-up in 2 weeks for OMT or sooner as needed.     History of Present Illness     Patient notes symptoms of tightness in her left calf.  She notes that this started a couple of days ago which she initially thought felt like a cut however there were no skin injuries noted.  Notes that this caused her constant discomfort all day yesterday.  Notes that this feels tight like a charley horse.  Patient also  notes that she has not slept well the past few nights due to significant low back pain.  She notes that she has been staying hydrated.  She notes that she was recently on a flight on  and is status post appendectomy on 2024.    Patient does also note difficulties with weight loss.  She notes that she is not interested in weight loss medications at this time.  She does note that she has been making significant lifestyle changes with increased fruits and vegetables and decreased snack foods without significant weight changes.        Review of Systems   Constitutional:  Negative for fever.   Cardiovascular:  Negative for leg swelling.   Gastrointestinal:  Negative for constipation and vomiting.   Genitourinary:  Negative for dysuria, frequency and hematuria.   Musculoskeletal:  Positive for back pain and myalgias.     Medical History Reviewed by provider this encounter:       Past Medical History   Past Medical History:   Diagnosis Date   • Interstitial cystitis    • Migraines    • Seizures (HCC)      Past Surgical History:   Procedure Laterality Date   • APPENDECTOMY LAPAROSCOPIC N/A 2024    Procedure: APPENDECTOMY LAPAROSCOPIC;  Surgeon: Per Allen MD;  Location: [facility];  Service: General   •  SECTION     • FL GUIDED NEEDLE PLAC BX/ASP/INJ  5/15/2023   • UPPER GASTROINTESTINAL ENDOSCOPY       Family History   Problem Relation Age of Onset   • Lung cancer Mother    • Breast cancer Mother    • Stroke Mother    • Hyperlipidemia Mother    • Atrial fibrillation Mother    • Hyperlipidemia Father    • Macular degeneration Father    • Myasthenia gravis Father    • Multiple sclerosis Sister    • Seizures Brother    • Atrial fibrillation Maternal Aunt    • Heart disease Maternal Grandmother    • Heart disease Maternal Grandfather    • Seizures Paternal Grandmother    • Lung cancer Paternal Grandfather    • Seizures Daughter    • Seizures Son    • Diabetes Neg Hx    • Thyroid disease Neg Hx       Current Outpatient Medications on File Prior to Visit   Medication Sig Dispense Refill   • dicyclomine (BENTYL) 20 mg tablet Take 1 tablet (20 mg total) by mouth every 6 (six) hours 90 tablet 1   • Junel 1/20 1-20 MG-MCG per tablet      • ketoconazole (NIZORAL) 2 % cream APPLY TO AFFECTED AREAS ON THE NECK AND EARS TWICE A DAY FOR 4 WEEKS THEN AS NEEDED     • ketoconazole (NIZORAL) 2 % shampoo SHAMPOO 2-3 TIMES WEEKLY     • magnesium gluconate (MAGONATE) 500 mg tablet Take 500 mg by mouth 2 (two) times a day     • norethindrone-ethinyl estradiol (Junel 1/20) 1-20 MG-MCG per tablet Take 1 tablet by mouth daily 28 tablet 3   • ondansetron (Zofran ODT) 4 mg disintegrating tablet Take 1 tablet (4 mg total) by mouth every 6 (six) hours as needed for nausea or vomiting 30 tablet 0   • Riboflavin 400 MG TABS Take 1 tablet (400 mg total) by mouth in the morning 30 tablet 5   • sertraline (ZOLOFT) 50 mg tablet Take 2.5 tablets (125 mg total) by mouth daily 225 tablet 3   • SUMAtriptan-naproxen (TREXIMET)  MG per tablet Take 1 po at onset of migraine, may repeat x 1 in 24hrs 10 tablet 1   • topiramate (TOPAMAX) 100 mg tablet TAKE 1 AND 1/2 TABLETS TWICE DAILY 90 tablet 8   • oxyCODONE (Roxicodone) 5 immediate release tablet Take 1 tablet (5 mg total) by mouth every 4 (four) hours as needed for severe pain for up to 6 doses Max Daily Amount: 30 mg (Patient not taking: Reported on 5/10/2024) 6 tablet 0   • pantoprazole (PROTONIX) 40 mg tablet Take 1 tablet (40 mg total) by mouth 2 (two) times a day 60 tablet 0     No current facility-administered medications on file prior to visit.     Allergies   Allergen Reactions   • Tetracycline Rash     Other reaction(s): Other (See Comments)  throat closes      Current Outpatient Medications on File Prior to Visit   Medication Sig Dispense Refill   • dicyclomine (BENTYL) 20 mg tablet Take 1 tablet (20 mg total) by mouth every 6 (six) hours 90 tablet 1   • Junel 1/20 1-20 MG-MCG  "per tablet      • ketoconazole (NIZORAL) 2 % cream APPLY TO AFFECTED AREAS ON THE NECK AND EARS TWICE A DAY FOR 4 WEEKS THEN AS NEEDED     • ketoconazole (NIZORAL) 2 % shampoo SHAMPOO 2-3 TIMES WEEKLY     • magnesium gluconate (MAGONATE) 500 mg tablet Take 500 mg by mouth 2 (two) times a day     • norethindrone-ethinyl estradiol (Junel 1/20) 1-20 MG-MCG per tablet Take 1 tablet by mouth daily 28 tablet 3   • ondansetron (Zofran ODT) 4 mg disintegrating tablet Take 1 tablet (4 mg total) by mouth every 6 (six) hours as needed for nausea or vomiting 30 tablet 0   • Riboflavin 400 MG TABS Take 1 tablet (400 mg total) by mouth in the morning 30 tablet 5   • sertraline (ZOLOFT) 50 mg tablet Take 2.5 tablets (125 mg total) by mouth daily 225 tablet 3   • SUMAtriptan-naproxen (TREXIMET)  MG per tablet Take 1 po at onset of migraine, may repeat x 1 in 24hrs 10 tablet 1   • topiramate (TOPAMAX) 100 mg tablet TAKE 1 AND 1/2 TABLETS TWICE DAILY 90 tablet 8   • oxyCODONE (Roxicodone) 5 immediate release tablet Take 1 tablet (5 mg total) by mouth every 4 (four) hours as needed for severe pain for up to 6 doses Max Daily Amount: 30 mg (Patient not taking: Reported on 5/10/2024) 6 tablet 0   • pantoprazole (PROTONIX) 40 mg tablet Take 1 tablet (40 mg total) by mouth 2 (two) times a day 60 tablet 0     No current facility-administered medications on file prior to visit.      Social History     Tobacco Use   • Smoking status: Never   • Smokeless tobacco: Never   Vaping Use   • Vaping status: Never Used   Substance and Sexual Activity   • Alcohol use: Not Currently     Comment: social   • Drug use: Never   • Sexual activity: Not on file     Objective     /58 (BP Location: Left arm, Patient Position: Sitting)   Pulse 86   Temp 97.5 °F (36.4 °C)   Ht 5' 6\" (1.676 m)   Wt 96.2 kg (212 lb)   SpO2 98%   BMI 34.22 kg/m²     Physical Exam  Vitals reviewed.   Constitutional:       General: She is not in acute distress.     " Appearance: She is well-developed.   HENT:      Head: Normocephalic and atraumatic.      Right Ear: External ear normal.      Left Ear: External ear normal.   Eyes:      Conjunctiva/sclera: Conjunctivae normal.   Cardiovascular:      Rate and Rhythm: Normal rate and regular rhythm.      Heart sounds: No murmur heard.  Pulmonary:      Effort: Pulmonary effort is normal. No respiratory distress.      Breath sounds: Normal breath sounds.   Abdominal:      Palpations: Abdomen is soft.      Tenderness: There is no abdominal tenderness.   Musculoskeletal:      Cervical back: Neck supple.      Right lower leg: No edema.      Left lower leg: No edema.      Comments: No erythema or swelling noted of the bilateral lower extremities.  Tenderness with palpation of the left calf.  Negative Homans' sign.   Lymphadenopathy:      Cervical: No cervical adenopathy.   Skin:     General: Skin is warm and dry.   Neurological:      General: No focal deficit present.      Mental Status: She is alert and oriented to person, place, and time.   Psychiatric:         Mood and Affect: Mood normal.         Behavior: Behavior normal.       Administrative Statements

## 2024-06-26 NOTE — PATIENT COMMUNICATION
Left message to call the office and schedule an office visit to discuss her current concerns. MyChart message sent as well.   [phone]

## 2024-06-28 PROBLEM — M54.42 CHRONIC BILATERAL LOW BACK PAIN WITH LEFT-SIDED SCIATICA: Status: ACTIVE | Noted: 2024-06-28

## 2024-06-28 PROBLEM — E66.09 NON MORBID OBESITY DUE TO EXCESS CALORIES: Status: RESOLVED | Noted: 2017-02-23 | Resolved: 2024-06-28

## 2024-06-28 PROBLEM — M79.662 PAIN OF LEFT CALF: Status: ACTIVE | Noted: 2024-06-28

## 2024-06-28 PROBLEM — G89.29 CHRONIC BILATERAL LOW BACK PAIN WITH LEFT-SIDED SCIATICA: Status: ACTIVE | Noted: 2024-06-28

## 2024-06-28 NOTE — ASSESSMENT & PLAN NOTE
Patient will continue with lifestyle management.  Discussed the benefits of whole plant food focused diet as well as physical activity 150 to 300 minutes/day.  Patient is not interested in weight loss medications at today's visit though she would meet criteria for these if interested.  Will continue discussion at follow-up in 2 weeks for OMT or sooner as needed.

## 2024-06-28 NOTE — ASSESSMENT & PLAN NOTE
Patient with signs and symptoms of left calf pain exacerbated with palpation, without swelling, erythema, warmth.  Surgery was not very recent and travel was approximately 1 month ago not likely to affect risk for recurrent DVT.  Patient notes active lifestyle otherwise.    Suspect possible lumbar radiculopathy.  Recommend workup for back pain as noted.  Patient to call the office should symptoms persist through 6/28 would consider ultrasound and laboratory testing for further workup.

## 2024-06-28 NOTE — ASSESSMENT & PLAN NOTE
Patient with exacerbation of chronic low back pain.  Recommend x-ray lumbar spine for further evaluation.  Patient will trial OMT for conservative management.  Would consider lumbar MRI should symptoms persist over the next 6 weeks.

## 2024-06-29 ENCOUNTER — PATIENT MESSAGE (OUTPATIENT)
Dept: FAMILY MEDICINE CLINIC | Facility: CLINIC | Age: 49
End: 2024-06-29

## 2024-07-02 ENCOUNTER — OFFICE VISIT (OUTPATIENT)
Dept: NEUROLOGY | Facility: CLINIC | Age: 49
End: 2024-07-02
Payer: COMMERCIAL

## 2024-07-02 VITALS
BODY MASS INDEX: 34.07 KG/M2 | OXYGEN SATURATION: 97 % | DIASTOLIC BLOOD PRESSURE: 70 MMHG | HEART RATE: 106 BPM | TEMPERATURE: 97.2 F | WEIGHT: 212 LBS | HEIGHT: 66 IN | SYSTOLIC BLOOD PRESSURE: 110 MMHG

## 2024-07-02 DIAGNOSIS — G43.109 MIGRAINE WITH AURA AND WITHOUT STATUS MIGRAINOSUS, NOT INTRACTABLE: ICD-10-CM

## 2024-07-02 DIAGNOSIS — G40.309 NONINTRACTABLE GENERALIZED IDIOPATHIC EPILEPSY WITHOUT STATUS EPILEPTICUS (HCC): Primary | ICD-10-CM

## 2024-07-02 PROCEDURE — 99214 OFFICE O/P EST MOD 30 MIN: CPT | Performed by: PHYSICIAN ASSISTANT

## 2024-07-02 RX ORDER — DEXAMETHASONE 2 MG/1
TABLET ORAL
Qty: 3 TABLET | Refills: 1 | Status: SHIPPED | OUTPATIENT
Start: 2024-07-02

## 2024-07-02 RX ORDER — SUMATRIPTAN AND NAPROXEN SODIUM 85; 500 MG/1; MG/1
TABLET, FILM COATED ORAL
Qty: 10 TABLET | Refills: 1 | Status: SHIPPED | OUTPATIENT
Start: 2024-07-02

## 2024-07-02 NOTE — PROGRESS NOTES
Patient ID: Renetta Rendon is a 48 y.o. female.    Assessment:  Ms. Renetta Rendon is a 48 y.o. 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 She has presumed risk for recurrent seizures based on having had a possible seizure during adulthood and family history of epilepsy. She has episodic migraine headaches as well. She was previously on higher doses of topiramate (presumably increased for migraines), but her migraines have overall been well controlled and she had not had any seizures, therefore we started reducing the topiramate due to cognitive complaints.  She is doing a bit better with a lower dose of topiramate.  Feels current dose is adequate, therefore will not make any changes today.     She sometimes feels her abortive therapy does not fully resolve her migraine.  She has infrequently requested dexamethasone, which works.  Will give her a Rx for dexamethasone to keep at home in case she has a refractory migraine.       Plan:   1 - continue topiramate to 100mg in the AM and 150mg in the PM  2 - continue riboflavin (B2) 400mg daily and magnesium glycinate   3 - continue sertraline at current dose   4 - you may take one clonazepam 0.5mg tab as needed for anxiety attack  5 - can use Treximet at onset of migraine if needed   6 - can use dexamethasone 2mg take 1 tab with food daily x 3 days if your regular migraine meds do not work   7 - avoid using excessive amount of ibuprofen (no more than 2 days a week, as long term use of ibuprofen can result in migraine overuse headaches).  8 - follow-up 4 months       Diagnoses and all orders for this visit:    Nonintractable generalized 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 (HCC)    Migraine with aura and  without status migrainosus, not intractable  -     SUMAtriptan-naproxen (TREXIMET)  MG per tablet; Take 1 po at onset of migraine, may repeat x 1 in 24hrs  -     dexamethasone (DECADRON) 2 mg tablet; Take 1 po daily with food x 3 days           Subjective:    KAN Rendon is a 48 y.o. right handed female here for follow-up evaluation of epilepsy, migraines, and paresthesia.      Interval history 7/2/2024:  Patient reports things have been overall stable since her last visit.  We decreased her topiramate last visit to see if that would further assist with cognition.  She feels things are about the same.  There have not been any events concerning for seizure.  Migraines overall well controlled, although sometimes her abortive therapy does not work and she feels she would need a course of dexamethasone.  In terms of frequency, things are stable, not increased, even with reducing topiramate.  She has been under some stress.     AED/side effects/compliance:  topiramate 100-150  Paresthesia, cognitive impairment (worse at higher doses, improved with dose reduction)     Event/Seizure semiology:  Absence seizures  Possible generalized tonic clonic seizure     Intake History 6/11/2021  She was diagnosed with absence seizures when she was 8 years old, she was put on Depakote, there were no further seizures. She noticed that she was losing her hair on Depakote. She was off medication since she was 14 years old. She was off AED for many years until she was 29 years old.  When she was 29 years old, six months after she had her son, she sent a an unusual text message to her  with random numbers. He called her back to find out what happened. She does not remember what happened. She noticed that she had showered but she does not recall showering. She recalled biting her tongue. It was suspected that she had a grand mal seizure.   She saw Dr. Gallegos at [facility] staring around 2003/2004. Probably due to her  "migraine headaches, she was put on topiramate to manage both conditions. She was planning a pregnant at 31 years old, transitioned from topiramate to lamotrigine. She unfortunately had a seizure.   She lost this pregnancy due to genetic disorder. Then 4 months later she was pregnant with twins; she had stayed on lamotrigine and was constantly checking her lamotrigine levels. After she delivered her twins, she went right back on topiramate.   When she is on topiramate, she struggles with word finding difficulty (I feel stupid).  She does not recall if lamotrigine was causing significant side effects.  Every since 2006, she has been on topiramate. She has always been on topiramate 200mg twice a day; she does not recall ever trying a lower dose of medication.  One night she woke up in a panic, like the world was going to end, she had difficulty thinking and putting her thoughts together, she was not able to calm down her nerves or thoughts, mind was racing like crazy, panic feeling lasted 5 minutes, she was worried that a seizure was going to happen. She was fairly coherent, but did not progress to altered awareness.  She has had anxiety attacks in the past, she gets aura of panic, which then can result in a migraine headaches. She started to have migraines around middle school.   She gets migraines that can last a week, just before her menses or weather changes. Headache is so severe she has to go into a hole, severely nauseated. Head pressure sensation over the front of head or back of the neck. She will try Advil 2 tabs to take the edge off and Coke or coffee.   When she was pregnant she did not have migraines.  Topiramate may help with migraines. She does not get a lot of migraines (maybe 5-6 in the last 6 months). If her migraines are severe she has \"major\" medication, compound medication that cost $400. (Looking at the list of medication dichloralphenazone is no longer available in the [place], which contains chloral " hydrate.)  She had tried Maxalt but it did not work. It may have been sumatriptan.      She may have used dexamethasone to abort headaches.  She is currently on sumatriptan.     Prior records from [facility]:  Last visit 1/23/2020 - she was prescribed Midrin (isometheptene-acetaminophen-dichloralphenazone) and Treximet for migraines. (she was having 5-10 days/month with headache, required steroids, under a lot of stress.)  She has restless legs, previously prescribed Quinine but she just drinks tonic water now.  Last seizure was in 2006; possible aura of panic, whole body warmth sensation  Visit on 12/5/2018 - she was having 1-2 migraines/month     Interval history 4/27/2022:  Patient was last seen 10 months ago. At timing of last visit, decision was made to reduce topiramate in order to alleviate side effects (she c/o cognitive dysfunction, paresthesia). Plan was to reduce from 200mg BID to 150mg BID.   She tells me today she has been taking topiramate 150mg AM and 200mg PM, says she did not know the plan was to reduce to 150mg BID. She is still having brain fog, cognitive dysfunction. She has trouble with word finding. No issues with ADLs, driving etc. 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  She denies any seizures since her last visit. Migraines have been “Stable”. she uses Treximet at onset of migraine. She is requesting Zofran, has previously had Rx for this, but not recently.  She needs refills of all of her medications. She was previously seen by Dr. Hicks who had been refilling her Zoloft and she needs a refill of that. She also needs refill of clonazepam, which she has been prescribed for anxiety to use PRN.     Interval history 8/17/2022:  Patient reports she is overall doing well since last visit. Her topiramate was reduced to 150mg BID. She also had labs done which showed low B12 and  "she started taking a B12 supplement. She feels she is doing better from a cognitive standpoint with these changes. She no longer has significant issues searching for words.   Her headaches were doing well but increased in the last few weeks. She recently changed birth control pills to a lower dose pill and feels that may be causing increased headaches. She just completed her first month of the new medication.  She reports she is struggling to lose weight. She is not sure she is eating the right things or not. She has been doing some research on gut health and hormones.      Interval history 12/28/2022:  Patient reports she is doing well from a neurologic standpoint. She denies any seizures, headaches are currently well controlled.  She is now following with medical weight loss center and working on diet and exercise, has lost some weight over the past few months.  She does note her mother passed away in October, which was very tough on her. She took a leave from work but will return next week.     Interval history 5/22/2023:  Patient reports she is doing well from a neurologic standpoint. She denies any seizures, headaches are currently well controlled.  In February she called the office reporting an episode of vertigo. This occurred after being treated with antibiotics for a sinus infection. After the episode of vertigo she continued to feel \"off\" and had a headache. I sent in dexamethasone for her and this cleared up her symptoms. She has not had a recurrence.   She is currently dealing with an issue in the right foot, following with podiatry.     Interval History 8/4/2023  Since her initial consultation with me in 2021, she has had follow-up with Katina Bowers for the past couple of years. Most recently on 5/22/2023 - no recurrent seizures, headaches were under control. Her topiramate dose was reduced due to paresthesias.      She reports that there have been no seizures.  The main issue has been her migraines. "   She sometimes associate her migraines with her menstrual period or with bad weather. She usually gets a migraine a week prior to her period.  Since May 2023, she has not had a really bad migraines. However, she may have headaches that happens a couple of times a month, which she will take ibuprofen with Coca-cola, which can last 2-3 days. These headaches are different from her migraines, because she has some nausea with limited painful area over her head. Her migraines are holocephalic and very debilitating to the point that she could not function. She had a 5 day migraine headache a week ago, which sumatriptan-naproxen did not abort the headache, she tried ibuprofen, coca-cola, even a migraine ice pack that goes over her head. She did take the five days of dexamethasone 2mg daily which helped to abort the migraine. She literally drinks water all day.      She continues that her cognition is impaired, with word finding difficulty and recall short term information. She was trying to remember something that happened recently. 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 Occasionally, she will have a bit of dizziness or abnormal lightheaded sensation, but it comes on for a while even if she is seated.   She has been on Zoloft since she had her son who is now a grown adult. It was started by her family doctor. She feels that her anxiety is stable, but when she tried to get off of sertraline, but it triggered a constant sensation of being anxious.     Interval history 2/6/2024:  Patient was last seen by Dr. Hearn in August 2023. At that time, he suggested decreasing topiramate (due to ongoing cognitive complaints), but she was hesitant due to concern for potential breakthrough seizures. It was suggested she add magnesium and B2 for migraines. She did not want to add another Rx  med for migraine prevention.     Today, patient reports her migraines have been overall stable. She had some stomach upset with magnesium oxide and is now taking magnesium gluconate. She felt the B2 was helpful, but she ran out and could not find any of this at the pharmacy. The Treximet still helps to alleviate her migraines when she needs it. She is still experiencing some cognitive difficulty and feels it is related to the topiramate. She is now open to considering decreasing the dose a bit, as previously suggested.     Woman of childbearing age with Epilepsy:  Contraception: Seasonale  Folic acid supplement: No     Prior Epilepsy History:  x     Special Features  Status epilepticus: No  Self Injury Seizures: No  Precipitating Factors: None  Post-ictal state: confusion     Epilepsy Risk Factors:  Abnormal pregnancy: No  Abnormal birth/: No  Abnormal Development: No  Febrile seizures, simple: No  Febrile seizures, complex: No  CNS infection: No  Mental retardation: No  Cerebral palsy: No  Head injury (moderate/severe): No  CNS neoplasm: No  CNS malformation: No  Neurosurgical procedure: No  Stroke: No  CNS autoimmune disorder: No  Alcohol abuse: No  Drug abuse: No  Family history Sz/epilepsy: her son (absence and grand mal seizures) and and one of her twin daughters (grand mal seizure at 4 years old); brother with febrile seizure; one brother with seizure     Prior AEDs:  medication Max dose Time used Reason to stop   divalpreox     Hair loss   lamotrigine         topiramate         Tegretol (as a child)            PRIOR Migraine trials  Abortives - Frova (side effects), Maxalt and Zomig (no help), advil, Zofran, tylenol, flexerile     Prior workup:  x  Imagin2023 - MRI brain wo  1. Grossly stable nonspecific scattered FLAIR hyperintense foci involving bilateral frontoparietal subcortical white matter which can be seen in migraines. Precocious microangiopathy is within differential consideration.  "Demyelinating disease is much less favored.  2. Chiari I malformation.     9/24/2019 - MRI brain  4-5 tiny foci of T2/FLAIR hyperintensity scattered in the subcortical white matter of both hemispheres.     8/10/2015 - MRI brain ([facility])  Right cerebellar tonsil protrudes through the foramen magnum unchanged from MRI 4/13/2010     EEGs:  None available     The following portions of the patient's history were reviewed and updated as appropriate: current medications, past family history, past medical history, past social history, past surgical history, and problem list.         Objective:    Blood pressure 110/70, pulse (!) 106, temperature (!) 97.2 °F (36.2 °C), temperature source Temporal, height 5' 6\" (1.676 m), weight 96.2 kg (212 lb), SpO2 97%, not currently breastfeeding.    Physical Exam  Constitutional:       Appearance: Normal appearance.   Eyes:      Extraocular Movements: EOM normal.      Pupils: Pupils are equal, round, and reactive to light.   Neurological:      Mental Status: She is alert.      Motor: Motor strength is normal.     Deep Tendon Reflexes: Reflexes are normal and symmetric.   Psychiatric:         Mood and Affect: Mood normal.         Speech: Speech normal.         Behavior: Behavior normal.         Neurological Exam  Mental Status  Alert. Oriented to person, place, time and situation. Speech is normal. Language is fluent with no aphasia. Attention and concentration are normal.    Cranial Nerves  CN II: Visual fields full to confrontation.  CN III, IV, VI: Extraocular movements intact bilaterally. Pupils equal round and reactive to light bilaterally.  CN V: Facial sensation is normal.  CN VII: Full and symmetric facial movement.  CN VIII: Hearing is normal.  CN IX, X: Palate elevates symmetrically  CN XI: Shoulder shrug strength is normal.  CN XII: Tongue midline without atrophy or fasciculations.    Motor   Normal muscle tone. Strength is 5/5 throughout all four extremities.    Sensory  Light " touch is normal in upper and lower extremities.     Reflexes  Deep tendon reflexes are 2+ and symmetric in all four extremities.    Coordination  Right: Finger-to-nose normal.Left: Finger-to-nose normal.    Gait  Casual gait is normal including stance, stride, and arm swing.        ROS:    Review of Systems   Constitutional: Negative.  Negative for fatigue and fever.   HENT: Negative.  Negative for hearing loss, tinnitus and trouble swallowing.    Eyes:  Negative for photophobia, pain and visual disturbance.   Respiratory: Negative.  Negative for cough and shortness of breath.    Cardiovascular: Negative.  Negative for chest pain and palpitations.   Gastrointestinal:  Negative for constipation, diarrhea, nausea and vomiting.   Endocrine: Negative.    Genitourinary: Negative.  Negative for difficulty urinating and urgency.   Musculoskeletal: Negative.  Negative for back pain, gait problem and neck pain.   Skin: Negative.  Negative for rash.   Neurological: Negative.  Negative for dizziness, tremors, seizures, syncope, speech difficulty, weakness, numbness and headaches.   Hematological: Negative.    Psychiatric/Behavioral:  Negative for decreased concentration and sleep disturbance. The patient is not nervous/anxious.      I personally reviewed and updated the ROS as appropriate

## 2024-07-02 NOTE — PATIENT INSTRUCTIONS
Plan:   1 - continue topiramate to 100mg in the AM and 150mg in the PM  2 - continue riboflavin (B2) 400mg daily and magnesium glycinate   3 - continue sertraline at current dose   4 - you may take one clonazepam 0.5mg tab as needed for anxiety attack  5 - can use Treximet at onset of migraine if needed   6 - can use dexamethasone 2mg take 1 tab with food daily x 3 days if your regular migraine meds do not work   7 - avoid using excessive amount of ibuprofen (no more than 2 days a week, as long term use of ibuprofen can result in migraine overuse headaches).  8 - follow-up 4 months

## 2024-07-03 ENCOUNTER — RA CDI HCC (OUTPATIENT)
Dept: OTHER | Facility: HOSPITAL | Age: 49
End: 2024-07-03

## 2024-07-03 ENCOUNTER — APPOINTMENT (OUTPATIENT)
Dept: RADIOLOGY | Facility: MEDICAL CENTER | Age: 49
End: 2024-07-03
Payer: COMMERCIAL

## 2024-07-03 ENCOUNTER — APPOINTMENT (OUTPATIENT)
Dept: LAB | Facility: MEDICAL CENTER | Age: 49
End: 2024-07-03
Payer: COMMERCIAL

## 2024-07-03 ENCOUNTER — TELEPHONE (OUTPATIENT)
Age: 49
End: 2024-07-03

## 2024-07-03 DIAGNOSIS — G89.29 CHRONIC BILATERAL LOW BACK PAIN WITH LEFT-SIDED SCIATICA: ICD-10-CM

## 2024-07-03 DIAGNOSIS — M54.42 CHRONIC BILATERAL LOW BACK PAIN WITH LEFT-SIDED SCIATICA: ICD-10-CM

## 2024-07-03 DIAGNOSIS — M79.662 PAIN OF LEFT CALF: ICD-10-CM

## 2024-07-03 DIAGNOSIS — M79.662 PAIN OF LEFT CALF: Primary | ICD-10-CM

## 2024-07-03 DIAGNOSIS — R74.8 ELEVATED ALKALINE PHOSPHATASE LEVEL: ICD-10-CM

## 2024-07-03 PROCEDURE — 72110 X-RAY EXAM L-2 SPINE 4/>VWS: CPT

## 2024-07-03 NOTE — PROGRESS NOTES
HCC coding opportunities          Chart Reviewed number of suggestions sent to Provider: 1     Patients Insurance        Commercial Insurance: [employer] Commercial [employer]        Matt Hassan Patient Age: 25 month old  MESSAGE: Interpreting service used: No    Insurance on file confirmed with caller: Yes    Pediatrics- Reason for call: Vaccine/ Medication Injection Request- Appointment-       Is patient up to date on wellness exam?: Yes-     Date of last wellness exam: 11/22/23    Requested Vaccine/Injection:  Unsure-Behind    Additional Information: None    Is the patient currently having any symptoms? No- Route to Provider's Clinical Support Pool          Message read back to caller for accuracy: Yes       ALLERGIES:  Eggs or egg-derived products   (food or med)  Current Outpatient Medications   Medication Sig Dispense Refill    EPINEPHrine (EpiPen Jr 2-Eusebio) 0.15 MG/0.3ML auto-injector Inject 0.3 mLs into the muscle as needed for Anaphylaxis. 4 each 0     No current facility-administered medications for this visit.     PHARMACY to use:           Pharmacy preference(s) on file:   [id number] - [place], [place] - [address]  [address] [address]  Phone: [phone] Fax: [phone]      CALL BACK INFO: Ok to leave response (including medical information) on answering machine      PCP: Bryant Contreras MD         INS: Payor: [employer]UE [employer]UE [employer]ELD [place] / Plan: PPO [id number] / Product Type: PPO MISC   PATIENT ADDRESS:  [address] [place] [id number]

## 2024-07-03 NOTE — TELEPHONE ENCOUNTER
Pt called to schedule her ultrasound and the order was incorrect. Please review and advise [phone] thank you.

## 2024-07-09 ENCOUNTER — APPOINTMENT (OUTPATIENT)
Dept: LAB | Facility: MEDICAL CENTER | Age: 49
End: 2024-07-09
Payer: COMMERCIAL

## 2024-07-09 LAB
ALBUMIN SERPL BCG-MCNC: 3.7 G/DL (ref 3.5–5)
ALP SERPL-CCNC: 137 U/L (ref 34–104)
ALT SERPL W P-5'-P-CCNC: 11 U/L (ref 7–52)
ANION GAP SERPL CALCULATED.3IONS-SCNC: 8 MMOL/L (ref 4–13)
AST SERPL W P-5'-P-CCNC: 16 U/L (ref 13–39)
BASOPHILS # BLD AUTO: 0.05 THOUSANDS/ÂΜL (ref 0–0.1)
BASOPHILS NFR BLD AUTO: 1 % (ref 0–1)
BILIRUB SERPL-MCNC: 0.35 MG/DL (ref 0.2–1)
BUN SERPL-MCNC: 12 MG/DL (ref 5–25)
CALCIUM SERPL-MCNC: 8.9 MG/DL (ref 8.4–10.2)
CHLORIDE SERPL-SCNC: 108 MMOL/L (ref 96–108)
CO2 SERPL-SCNC: 22 MMOL/L (ref 21–32)
CREAT SERPL-MCNC: 0.66 MG/DL (ref 0.6–1.3)
EOSINOPHIL # BLD AUTO: 0.04 THOUSAND/ÂΜL (ref 0–0.61)
EOSINOPHIL NFR BLD AUTO: 1 % (ref 0–6)
ERYTHROCYTE [DISTWIDTH] IN BLOOD BY AUTOMATED COUNT: 12.9 % (ref 11.6–15.1)
GFR SERPL CREATININE-BSD FRML MDRD: 105 ML/MIN/1.73SQ M
GLUCOSE P FAST SERPL-MCNC: 85 MG/DL (ref 65–99)
HCT VFR BLD AUTO: 40.2 % (ref 34.8–46.1)
HGB BLD-MCNC: 13.5 G/DL (ref 11.5–15.4)
IMM GRANULOCYTES # BLD AUTO: 0.01 THOUSAND/UL (ref 0–0.2)
IMM GRANULOCYTES NFR BLD AUTO: 0 % (ref 0–2)
LYMPHOCYTES # BLD AUTO: 2.77 THOUSANDS/ÂΜL (ref 0.6–4.47)
LYMPHOCYTES NFR BLD AUTO: 39 % (ref 14–44)
MAGNESIUM SERPL-MCNC: 2 MG/DL (ref 1.9–2.7)
MCH RBC QN AUTO: 31.6 PG (ref 26.8–34.3)
MCHC RBC AUTO-ENTMCNC: 33.6 G/DL (ref 31.4–37.4)
MCV RBC AUTO: 94 FL (ref 82–98)
MONOCYTES # BLD AUTO: 0.38 THOUSAND/ÂΜL (ref 0.17–1.22)
MONOCYTES NFR BLD AUTO: 5 % (ref 4–12)
NEUTROPHILS # BLD AUTO: 3.9 THOUSANDS/ÂΜL (ref 1.85–7.62)
NEUTS SEG NFR BLD AUTO: 54 % (ref 43–75)
NRBC BLD AUTO-RTO: 0 /100 WBCS
PLATELET # BLD AUTO: 322 THOUSANDS/UL (ref 149–390)
PMV BLD AUTO: 9.6 FL (ref 8.9–12.7)
POTASSIUM SERPL-SCNC: 4.3 MMOL/L (ref 3.5–5.3)
PROT SERPL-MCNC: 7.5 G/DL (ref 6.4–8.4)
RBC # BLD AUTO: 4.27 MILLION/UL (ref 3.81–5.12)
SODIUM SERPL-SCNC: 138 MMOL/L (ref 135–147)
TSH SERPL DL<=0.05 MIU/L-ACNC: 1.66 UIU/ML (ref 0.45–4.5)
VIT B12 SERPL-MCNC: 563 PG/ML (ref 180–914)
WBC # BLD AUTO: 7.15 THOUSAND/UL (ref 4.31–10.16)

## 2024-07-09 PROCEDURE — 84443 ASSAY THYROID STIM HORMONE: CPT

## 2024-07-09 PROCEDURE — 84075 ASSAY ALKALINE PHOSPHATASE: CPT

## 2024-07-09 PROCEDURE — 82607 VITAMIN B-12: CPT

## 2024-07-09 PROCEDURE — 36415 COLL VENOUS BLD VENIPUNCTURE: CPT

## 2024-07-09 PROCEDURE — 80053 COMPREHEN METABOLIC PANEL: CPT

## 2024-07-09 PROCEDURE — 84080 ASSAY ALKALINE PHOSPHATASES: CPT

## 2024-07-09 PROCEDURE — 85025 COMPLETE CBC W/AUTO DIFF WBC: CPT

## 2024-07-09 PROCEDURE — 83735 ASSAY OF MAGNESIUM: CPT

## 2024-07-12 ENCOUNTER — PROCEDURE VISIT (OUTPATIENT)
Dept: FAMILY MEDICINE CLINIC | Facility: CLINIC | Age: 49
End: 2024-07-12
Payer: COMMERCIAL

## 2024-07-12 ENCOUNTER — HOSPITAL ENCOUNTER (OUTPATIENT)
Dept: NON INVASIVE DIAGNOSTICS | Facility: HOSPITAL | Age: 49
Discharge: HOME | End: 2024-07-12
Attending: FAMILY MEDICINE
Payer: COMMERCIAL

## 2024-07-12 VITALS
OXYGEN SATURATION: 96 % | BODY MASS INDEX: 34.17 KG/M2 | HEIGHT: 66 IN | TEMPERATURE: 97.6 F | DIASTOLIC BLOOD PRESSURE: 64 MMHG | WEIGHT: 212.6 LBS | HEART RATE: 94 BPM | SYSTOLIC BLOOD PRESSURE: 118 MMHG

## 2024-07-12 DIAGNOSIS — M99.04 SOMATIC DYSFUNCTION OF SACRAL REGION: ICD-10-CM

## 2024-07-12 DIAGNOSIS — M99.03 SOMATIC DYSFUNCTION OF LUMBAR REGION: ICD-10-CM

## 2024-07-12 DIAGNOSIS — M99.02 SOMATIC DYSFUNCTION OF THORACIC REGION: ICD-10-CM

## 2024-07-12 DIAGNOSIS — M99.05 SOMATIC DYSFUNCTION OF PELVIS REGION: ICD-10-CM

## 2024-07-12 DIAGNOSIS — M79.662 PAIN OF LEFT CALF: ICD-10-CM

## 2024-07-12 DIAGNOSIS — M54.42 CHRONIC BILATERAL LOW BACK PAIN WITH LEFT-SIDED SCIATICA: Primary | ICD-10-CM

## 2024-07-12 DIAGNOSIS — G89.29 CHRONIC BILATERAL LOW BACK PAIN WITH LEFT-SIDED SCIATICA: Primary | ICD-10-CM

## 2024-07-12 LAB
ALP BONE CFR SERPL: 31 % (ref 14–68)
ALP INTEST CFR SERPL: 12 % (ref 0–18)
ALP LIVER CFR SERPL: 57 % (ref 18–85)
ALP SERPL-CCNC: 156 IU/L (ref 44–121)

## 2024-07-12 PROCEDURE — 99213 OFFICE O/P EST LOW 20 MIN: CPT | Performed by: FAMILY MEDICINE

## 2024-07-12 PROCEDURE — 93971 EXTREMITY STUDY: CPT | Performed by: INTERNAL MEDICINE

## 2024-07-12 PROCEDURE — 98926 OSTEOPATH MANJ 3-4 REGIONS: CPT | Performed by: FAMILY MEDICINE

## 2024-07-12 PROCEDURE — 93971 EXTREMITY STUDY: CPT

## 2024-07-12 NOTE — PATIENT INSTRUCTIONS
"  Patient Education     Exercises for sciatic pain   The Basics   Written by the doctors and editors at [facility]   What is sciatica? -- The sciatic nerve is a large nerve that starts in the lower back. It runs all the way down the back of the leg.  Something like a disc or bone spur can pinch or damage the sciatic nerve. Tight, inflamed muscles can also pinch or damage it. This can cause pain, weakness, numbness, or tingling that goes from the buttock down the leg toward the heel. When these symptoms happen, people often call it \"sciatica.\" The medical name for this is \"radiculopathy.\"  You can have sciatic pain on 1 side or both. Most of the time, it gets better without surgery.  Why do I need to do exercises if I have sciatica? -- Stretching and strengthening exercises can help ease back pain. It might also help prevent future back pain. Long term, it is important to strengthen the muscles in your lower back, buttocks, and belly. These are your \"core muscles.\" Stretching exercises are also important to keep your muscles flexible.  Below are some stretching and strengthening exercises that might help you. Other forms of movement can help ease or prevent back pain, too. For example, some people like to walk or do aerobic exercise, yoga, or ruth ann chi. The most important thing is to move your body. Your doctor, nurse, or physical therapist can help you find different types of activity that work for you.  What stretching exercises should I do? -- Warm up your muscles before stretching. This helps prevent injury. To warm up, you can walk, jog, or cycle. Below are some examples of stretching exercises.  Start by repeating each of these stretches 2 to 3 times. For your body to make changes, try to hold each stretch for 5 to 10 seconds. Try to do the stretches 2 to 3 times each day. Breathe slowly and deeply as you do the exercises. Never bounce when doing stretches.   Single knee-to-chest stretches (figure 1) ? While lying " on your back, bend your knees with your feet flat on the floor. Pull 1 knee toward your chest until you feel a stretch in your lower back and buttock area. Lower, and repeat with the other knee. If you have knee problems, pull your knee up by grabbing the back of your thigh instead of the front of your knee. You can also do this exercise by grabbing both knees at the same time.   Deep hip stretches lying down (figure 2) ? Lie on your back, and bend 1 knee, keeping that foot flat on the floor. Cross the other leg over your knee. Grab the thigh of the leg that has the foot on the floor. Slowly, pull the bottom leg toward your chest until you feel a stretch in the other buttock. Repeat using the opposite leg as the bottom leg.   Deep hip stretches sitting (figure 3) ? Sit on the floor with both legs straight. Take 1 leg and cross it over the other leg so that the ankle or foot of your top leg is next to your other knee. Now, take the elbow on the opposite side of your bent knee and bring it to the outside of the bent knee. With your elbow, slowly push the bent knee further across your body to get a good stretch in the hip.   Sit backs (figure 4) - Start on your hands and knees. Your hands should be directly under your shoulders. Your knees should be spread slightly and directly under your hips. Slowly stretch your back as you bring your hips toward your ankles. Your arms are extended forward in a relaxed position, as your upper body sinks toward the floor.  What strengthening exercises should I do? -- Below are some examples of strengthening exercises.  Start by doing each exercise 2 to 3 times. Work up to doing each exercise 10 times. Hold each exercise for 3 to 5 seconds. Try to do the exercises 2 to 3 times each day. Do all exercises slowly.   Pelvic tilts (figure 5) ? Lie on your back with your knees bent and feet flat on the floor. Breathe slowly and deeply. Press your lower back down to the floor. Tighten your  stomach muscles as you breathe deeply and slowly, then relax.   Hip lifts (figure 6) ? Lie on your back with your knees bent and feet flat on the floor. Breathe deeply and slowly. Tighten your stomach muscles, keep your back flat, and lift your buttocks off the floor. Relax. You should feel this in your buttocks, not in your lower back.  What else should I know?    Exercise, including stretching, might be slightly uncomfortable, but you should not have sharp or severe pain. If you do get severe pain, stop what you are doing. If severe pain continues, call your doctor or nurse.   Do not hold your breath when exercising. If you tend to hold your breath, try counting out loud when exercising.   Always warm up your muscles before exercising. Stretching before warming up can lead to injury.   Doing exercises before a meal can help you get into a routine.  All topics are updated as new evidence becomes available and our peer review process is complete.  This topic retrieved from [employer] on: May 15, 2024.  Topic 872145 Version 1.0  Release: 32.4.3 - C32.134  © 2024 Up[employer], Inc. and/or its affiliates. All rights reserved.  figure 1: Single knee-to-chest stretch     Lie on your back, bend your knees, and have your feet flat on the floor. Pull 1 knee toward your chest until you feel a stretch in your lower back and buttock area. Repeat with the other knee. If you have knee problems, pull your knee up by grabbing the back of your thigh instead of the front of your knee. You can also do this exercise by grabbing both knees at the same time.  Graphic [id number] Version 1.0  figure 2: Deep hip stretch lying down     Lieon your back, and bend 1 knee, keeping that foot flat on the floor. Cross theother leg over your knee. Grab the thigh of the leg that has the foot on thefloor. Slowly, pull the bottom leg toward your chest until you feel a stretchin the other buttock. Repeat using the opposite leg as the bottom leg.  Graphic [id number]  Version 1.0  figure 3: Deep hip stretch sitting     Siton the floor with both legs straight. Take 1 leg and cross it over the otherleg so that the foot of your top leg is next to your outer knee. Now, take theelbow on the opposite side of your bent knee and bring it to the outside of thebent knee. With your elbow, slowly push the bent knee further across your bodyto get a good stretch in the hip.  Graphic [id number] Version 1.0  figure 4: Sit backs     Starton your hands and knees. Your hands should be directly under your shoulders.Your knees should be spread slightly and directly under your hips. Slowly stretchyour back as you bring your hips toward your ankles. Your arms should be extendedforward in a relaxed position, as your upper body sinks toward the floor.  Graphic [id number] Version 1.0  figure 5: Pelvic tilts     Lie on your back with your knees bent and feet flat on the floor. Tighten your stomach muscles and press your lower back down to the floor. Relax.  Graphic [id number] Version 1.0  figure 6: Hip lifts     Lie on your back with your knees bent and feet flat on the floor. Tighten your stomach muscles and lift your buttocks off of the floor. Relax.  Graphic [id number] Version 1.0  Consumer Information Use and Disclaimer   Disclaimer: This generalized information is a limited summary of diagnosis, treatment, and/or medication information. It is not meant to be comprehensive and should be used as a tool to help the user understand and/or assess potential diagnostic and treatment options. It does NOT include all information about conditions, treatments, medications, side effects, or risks that may apply to a specific patient. It is not intended to be medical advice or a substitute for the medical advice, diagnosis, or treatment of a health care provider based on the health care provider's examination and assessment of a patient's specific and unique circumstances. Patients must speak with a health care provider for  complete information about their health, medical questions, and treatment options, including any risks or benefits regarding use of medications. This information does not endorse any treatments or medications as safe, effective, or approved for treating a specific patient. Up[employer] Inc[employer] and its affiliates disclaim any warranty or liability relating to this information or the use thereof.The use of this information is governed by the Terms of Use, available at https://www.woltersBillGuarduwer.com/en/know/clinical-effectiveness-terms. 2024© UpToDate, Inc. and its affiliates and/or licensors. All rights reserved.  Copyright   © 2024 [employer]Date, Inc. and/or its affiliates. All rights reserved.

## 2024-07-12 NOTE — PROGRESS NOTES
The Assessment & Plan     This is a 48 y.o. female who presents for OMT follow-up for:  1. Chronic bilateral low back pain with left-sided sciatica  OMT      2. Somatic dysfunction of thoracic region  OMT      3. Somatic dysfunction of lumbar region  OMT      4. Somatic dysfunction of sacral region  OMT      5. Somatic dysfunction of pelvis region  OMT           1. Patient tolerated OMT well for the above problems,  advised patient to drink fluids and can use NSAID for soreness after treatment     2. OMT Follow up in 2 weeks.    Recommend that patient complete stat duplex ultrasound of the left lower extremities soon as possible to rule out DVT.    Subjective     Renetta Rendon is a 48 y.o. female and is here for a OMT initial evaluation and treatment regarding low back pain. The patient reports no change in symptoms since her previous visit.  She notes that she continues with cramping pain in left lower leg.  She has not yet been able to complete duplex ultrasound.  She notes tenderness to palpation in that area.  She denies associated fevers, chills, bowel or bladder incontinence.  She denies unexpected weight changes.      Has the patient completed physical therapy for this condition? no  Did Patient symptoms improve from last OMT appointment?  NA    The following portions of the patient's history were reviewed and updated as appropriate: allergies, current medications, past family history, past medical history, past social history, past surgical history, and problem list.    Review of Systems  Review of Systems   Constitutional:  Negative for fever.   Cardiovascular:  Negative for leg swelling.   Gastrointestinal:  Negative for constipation and vomiting.   Genitourinary:  Negative for dysuria, frequency and hematuria.   Musculoskeletal:  Positive for back pain and myalgias.         Objective     OMT Exam     OMT    Performed by: Emily Pagan DO  Authorized by: Emily Pagan DO  Universal  Protocol:  Consent: Verbal consent obtained.  Risks and benefits: risks, benefits and alternatives were discussed  Consent given by: patient      Procedure Details:     Region evaluated and treated:  Lumbar, Thoracic, Sacrum/Pelvis and Pelvis Innominate    Thoracic Information  Thoracic Region: T10 - T12  Thoracic T10 - T12 details:     Examination Method:  Tissue Texture Change, Stability, Laxity, Effusions, Tone, Asymmetry, Misalignment, Crepitation, Defects, Masses, Range of Motion, Contracture, Tenderness, Pain, Passive and Active    Severity:  Moderate    Osteopathic Findings:  Bilateral paraspinal hypertonicity    Treatment Method:  Soft Tissue Treatment and Muscle Energy Treatment    Response:  Improved - The somatic dysfunction is improved but not completely resolved.    Lumbar details:     Examination Method:  Tissue Texture Change, Stability, Laxity, Effusions, Tone, Asymmetry, Misalignment, Crepitation, Defects, Masses, Range of Motion, Contracture, Tenderness, Pain, Passive and Active    Severity:  Moderate    Osteopathic Findings:  Bilateral paraspinal hypertonicity    Treatment Method:  Muscle Energy Treatment, Myofascial Release Treatment and Soft Tissue Treatment    Response:  Improved - The somatic dysfunction is improved but not completely resolved.    Sacrum/Pelvis details:     Examination Method:  Tissue Texture Change, Stability, Laxity, Effusions, Tone, Asymmetry, Misalignment, Crepitation, Defects, Masses, Range of Motion, Contracture, Tenderness, Pain, Passive and Active    Severity:  Moderate    Osteopathic Findings:  Left on right sacral torsion    Treatment Method:  Myofascial Release Treatment and Muscle Energy Treatment    Response:  Improved - The somatic dysfunction is improved but not completely resolved.    Pelvis Innominate details:     Examination Method:  Tissue Texture Change, Stability, Laxity, Effusions, Tone, Asymmetry, Misalignment, Crepitation, Defects, Masses, Range of  Motion, Contracture, Tenderness, Pain, Passive and Active    Severity:  Moderate    Osteopathic Findings:  Right innominate anterior.  Left AL5 tender point.  Left piriformis tender point    Treatment Method:  Counterstrain Treatment and Muscle Energy Treatment    Response:  Improved - The somatic dysfunction is improved but not completely resolved.    Total Regions Treated:  4

## 2024-07-13 DIAGNOSIS — R10.2 PELVIC PAIN IN FEMALE: ICD-10-CM

## 2024-07-13 RX ORDER — NORETHINDRONE ACETATE AND ETHINYL ESTRADIOL 1; 20 MG/1; UG/1
1 TABLET ORAL DAILY
Qty: 21 TABLET | Refills: 5 | Status: SHIPPED | OUTPATIENT
Start: 2024-07-13

## 2024-07-30 ENCOUNTER — PROCEDURE VISIT (OUTPATIENT)
Dept: FAMILY MEDICINE CLINIC | Facility: CLINIC | Age: 49
End: 2024-07-30
Payer: COMMERCIAL

## 2024-07-30 VITALS
BODY MASS INDEX: 34.42 KG/M2 | HEART RATE: 102 BPM | TEMPERATURE: 97.5 F | OXYGEN SATURATION: 99 % | HEIGHT: 66 IN | DIASTOLIC BLOOD PRESSURE: 70 MMHG | WEIGHT: 214.2 LBS | SYSTOLIC BLOOD PRESSURE: 106 MMHG

## 2024-07-30 DIAGNOSIS — M99.04 SOMATIC DYSFUNCTION OF SACRAL REGION: ICD-10-CM

## 2024-07-30 DIAGNOSIS — G25.81 RESTLESS LEG: ICD-10-CM

## 2024-07-30 DIAGNOSIS — M99.03 SOMATIC DYSFUNCTION OF LUMBAR REGION: ICD-10-CM

## 2024-07-30 DIAGNOSIS — G89.29 CHRONIC BILATERAL LOW BACK PAIN WITH LEFT-SIDED SCIATICA: Primary | ICD-10-CM

## 2024-07-30 DIAGNOSIS — M54.42 CHRONIC BILATERAL LOW BACK PAIN WITH LEFT-SIDED SCIATICA: Primary | ICD-10-CM

## 2024-07-30 DIAGNOSIS — M99.02 SOMATIC DYSFUNCTION OF THORACIC REGION: ICD-10-CM

## 2024-07-30 DIAGNOSIS — M99.05 SOMATIC DYSFUNCTION OF PELVIS REGION: ICD-10-CM

## 2024-07-30 PROCEDURE — 99213 OFFICE O/P EST LOW 20 MIN: CPT | Performed by: FAMILY MEDICINE

## 2024-07-30 PROCEDURE — 3725F SCREEN DEPRESSION PERFORMED: CPT | Performed by: FAMILY MEDICINE

## 2024-07-30 PROCEDURE — 98926 OSTEOPATH MANJ 3-4 REGIONS: CPT | Performed by: FAMILY MEDICINE

## 2024-07-30 NOTE — PROGRESS NOTES
The Assessment & Plan     This is a 49 y.o. female who presents for OMT follow-up for:  1. Chronic bilateral low back pain with left-sided sciatica  OMT      2. Somatic dysfunction of thoracic region  OMT      3. Somatic dysfunction of lumbar region  OMT      4. Somatic dysfunction of sacral region  OMT      5. Somatic dysfunction of pelvis region  OMT      6. Restless leg  Iron Panel (Includes Ferritin, Iron Sat%, Iron, and TIBC)           1. Patient tolerated OMT well for the above problems,  advised patient to drink fluids and can use NSAID for soreness after treatment     2.  Patient is provided stretches for home exercise routine.  Recommend stretching prior to bedtime and trial of B complex vitamins over the next 2 to 3 weeks to see if this helps with cramping symptoms.  Discussed risks for arrhythmias with quinine/tonic water management.    2. OMT Follow up in 2 weeks.    Subjective     Renetta Rendon is a 49 y.o. female with past medical history of Chiari I malformation, migraines, LPR, poor syndrome, epilepsy and is here for a OMT follow up. The patient reports improvement in her low back pain with treatment.  She does note exacerbation of pain with recent travel.  She continues with intermittent cramping in the left calf.  Venous duplex was negative.     Is the patient taking Pain medication? no  Has the patient completed physical therapy for this condition? no  Did Patient symptoms improve from last OMT appointment? yes    The following portions of the patient's history were reviewed and updated as appropriate: allergies, current medications, past family history, past medical history, past social history, past surgical history, and problem list.    Review of Systems  Review of Systems   Constitutional:  Negative for fever.   Cardiovascular:  Negative for leg swelling.   Gastrointestinal:  Negative for constipation and vomiting.   Genitourinary:  Negative for dysuria, frequency and hematuria.    Musculoskeletal:  Positive for back pain and myalgias.         Objective     OMT Exam     OMT    Performed by: Emily Pagan DO  Authorized by: Emily Pagan DO  Universal Protocol:  Consent: Verbal consent obtained.  Risks and benefits: risks, benefits and alternatives were discussed  Consent given by: patient      Procedure Details:     Region evaluated and treated:  Lumbar, Thoracic, Sacrum/Pelvis and Pelvis Innominate    Thoracic Information  Thoracic Region: T10 - T12  Thoracic T10 - T12 details:     Examination Method:  Tissue Texture Change, Stability, Laxity, Effusions, Tone, Asymmetry, Misalignment, Crepitation, Defects, Masses, Range of Motion, Contracture, Tenderness, Pain, Passive and Active    Severity:  Moderate    Osteopathic Findings:  Bilateral paraspinal hypertonicity    Treatment Method:  Soft Tissue Treatment and Muscle Energy Treatment    Response:  Improved - The somatic dysfunction is improved but not completely resolved.    Lumbar details:     Examination Method:  Tissue Texture Change, Stability, Laxity, Effusions, Tone, Asymmetry, Misalignment, Crepitation, Defects, Masses, Range of Motion, Contracture, Tenderness, Pain, Passive and Active    Severity:  Moderate    Osteopathic Findings:  Bilateral paraspinal hypertonicity    Treatment Method:  Muscle Energy Treatment, Myofascial Release Treatment and Soft Tissue Treatment    Response:  Improved - The somatic dysfunction is improved but not completely resolved.    Sacrum/Pelvis details:     Examination Method:  Tissue Texture Change, Stability, Laxity, Effusions, Tone, Asymmetry, Misalignment, Crepitation, Defects, Masses, Range of Motion, Contracture, Tenderness, Pain, Passive and Active    Severity:  Moderate    Osteopathic Findings:  R on L sacral torsion    Treatment Method:  Myofascial Release Treatment and Muscle Energy Treatment    Response:  Improved - The somatic dysfunction is improved but not completely resolved.    Pelvis  Innominate details:     Examination Method:  Tissue Texture Change, Stability, Laxity, Effusions, Tone, Asymmetry, Misalignment, Crepitation, Defects, Masses, Range of Motion, Contracture, Tenderness, Pain, Passive and Active    Severity:  Moderate    Osteopathic Findings:  Right innominate anterior.  Left AL5 tender point.  R piriformis tender point.  Left innominate posterior    Treatment Method:  Counterstrain Treatment and Muscle Energy Treatment    Response:  Improved - The somatic dysfunction is improved but not completely resolved.    Total Regions Treated:  4

## 2024-11-12 ENCOUNTER — RA CDI HCC (OUTPATIENT)
Dept: OTHER | Facility: HOSPITAL | Age: 49
End: 2024-11-12

## 2024-11-27 ENCOUNTER — OFFICE VISIT (OUTPATIENT)
Dept: NEUROLOGY | Facility: CLINIC | Age: 49
End: 2024-11-27
Payer: COMMERCIAL

## 2024-11-27 VITALS
SYSTOLIC BLOOD PRESSURE: 112 MMHG | OXYGEN SATURATION: 98 % | TEMPERATURE: 97.8 F | BODY MASS INDEX: 34.58 KG/M2 | DIASTOLIC BLOOD PRESSURE: 78 MMHG | HEIGHT: 66 IN | WEIGHT: 215.2 LBS | RESPIRATION RATE: 18 BRPM | HEART RATE: 89 BPM

## 2024-11-27 DIAGNOSIS — G40.309 NONINTRACTABLE GENERALIZED IDIOPATHIC EPILEPSY WITHOUT STATUS EPILEPTICUS (HCC): Primary | ICD-10-CM

## 2024-11-27 DIAGNOSIS — G43.109 MIGRAINE WITH AURA AND WITHOUT STATUS MIGRAINOSUS, NOT INTRACTABLE: ICD-10-CM

## 2024-11-27 PROCEDURE — 99214 OFFICE O/P EST MOD 30 MIN: CPT | Performed by: PHYSICIAN ASSISTANT

## 2024-11-27 NOTE — PROGRESS NOTES
Name: Renetta Rendon      : 1975      MRN: [id number]  Encounter Provider: Katina Bowers PA-C  Encounter Date: 2024   Encounter department: [facility]    :  Assessment & Plan  Nonintractable generalized 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 (HCC)  Ms. Renetta Rendon is a 49 y.o. 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 She has presumed risk for recurrent seizures based on having had a possible seizure during adulthood and family history of epilepsy. She has episodic migraine headaches as well. She was previously on higher doses of topiramate (presumably increased for migraines), but her migraines have overall been well controlled and she had not had any seizures, therefore we started reducing the topiramate due to cognitive complaints. She is doing a bit better with a lower dose of topiramate.  We will make 1 more reduction, but I would not go down further, as this would be a subtherapeutic dose for epilepsy.     Plan:   1 - reduce topiramate to 100mg in the AM and 100mg in the PM.    2 - call the office if there are any events concerning for seizure  3 - continue sertraline at current dose  4 - you may take one clonazepam 0.5mg tab as needed for anxiety attack       Migraine with aura and without status migrainosus, not intractable  Migraines have been a bit more frequent lately, but she feels this is due to specific stressors recently.  We are going to reduce topiramate again, but discussed if migraine frequency increases, we can go back to prior dosing of topiramate.     Plan:  1 - topiramate as above  2 - continue riboflavin and magnesium glycinate  3 -can continue Treximet at onset of migraine if  needed  4 -can use dexamethasone 2 mg take 1 tab with food daily x 3 days if you are regular migraine meds do not work  5 -avoid using excessive amount of ibuprofen (no more than 2 days a week, as long-term use of ibuprofen can result in medication overuse headaches/rebound headaches)       Follow-up in 4 months or sooner if needed      History of Present Illness   HPI  Renetta Rendon is a 49 y.o. right handed female here for follow-up evaluation of epilepsy, migraines, and paresthesia.      Interval history 11/27/2024:  Patient reports things have been overall stable since her last visit.  There have not been any events concerning for seizure. Migraines have increased a bit, but she feels that is directly related to stressors over the past few months.    There have not been any events concerning for seizure.  She does feel the reductions in topiramate have been helpful cognitively and wondering if we can make another reduction.      AED/side effects/compliance:  topiramate 100-150  Paresthesia, cognitive impairment (worse at higher doses, improved with dose reduction)     Event/Seizure semiology:  Absence seizures  Possible generalized tonic clonic seizure     Intake History 6/11/2021  She was diagnosed with absence seizures when she was 8 years old, she was put on Depakote, there were no further seizures. She noticed that she was losing her hair on Depakote. She was off medication since she was 14 years old. She was off AED for many years until she was 29 years old.  When she was 29 years old, six months after she had her son, she sent a an unusual text message to her  with random numbers. He called her back to find out what happened. She does not remember what happened. She noticed that she had showered but she does not recall showering. She recalled biting her tongue. It was suspected that she had a grand mal seizure.   She saw Dr. Gallegos at [facility] staring around 2003/2004. Probably due to her  "migraine headaches, she was put on topiramate to manage both conditions. She was planning a pregnant at 31 years old, transitioned from topiramate to lamotrigine. She unfortunately had a seizure.   She lost this pregnancy due to genetic disorder. Then 4 months later she was pregnant with twins; she had stayed on lamotrigine and was constantly checking her lamotrigine levels. After she delivered her twins, she went right back on topiramate.   When she is on topiramate, she struggles with word finding difficulty (I feel stupid).  She does not recall if lamotrigine was causing significant side effects.  Every since 2006, she has been on topiramate. She has always been on topiramate 200mg twice a day; she does not recall ever trying a lower dose of medication.  One night she woke up in a panic, like the world was going to end, she had difficulty thinking and putting her thoughts together, she was not able to calm down her nerves or thoughts, mind was racing like crazy, panic feeling lasted 5 minutes, she was worried that a seizure was going to happen. She was fairly coherent, but did not progress to altered awareness.  She has had anxiety attacks in the past, she gets aura of panic, which then can result in a migraine headaches. She started to have migraines around middle school.   She gets migraines that can last a week, just before her menses or weather changes. Headache is so severe she has to go into a hole, severely nauseated. Head pressure sensation over the front of head or back of the neck. She will try Advil 2 tabs to take the edge off and Coke or coffee.   When she was pregnant she did not have migraines.  Topiramate may help with migraines. She does not get a lot of migraines (maybe 5-6 in the last 6 months). If her migraines are severe she has \"major\" medication, compound medication that cost $400. (Looking at the list of medication dichloralphenazone is no longer available in the [place], which contains chloral " hydrate.)  She had tried Maxalt but it did not work. It may have been sumatriptan.      She may have used dexamethasone to abort headaches.  She is currently on sumatriptan.     [facility] from [facility]:  Last visit 1/23/2020 - she was prescribed Midrin (isometheptene-acetaminophen-dichloralphenazone) and Treximet for migraines. (she was having 5-10 days/month with headache, required steroids, under a lot of stress.)  She has restless legs, previously prescribed Quinine but she just drinks tonic water now.  Last seizure was in 2006; possible aura of panic, whole body warmth sensation  Visit on 12/5/2018 - she was having 1-2 migraines/month     Interval history 4/27/2022:  Patient was last seen 10 months ago. At timing of last visit, decision was made to reduce topiramate in order to alleviate side effects (she c/o cognitive dysfunction, paresthesia). Plan was to reduce from 200mg BID to 150mg BID.   She tells me today she has been taking topiramate 150mg AM and 200mg PM, says she did not know the plan was to reduce to 150mg BID. She is still having brain fog, cognitive dysfunction. She has trouble with word finding. No issues with ADLs, driving etc. 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  She denies any seizures since her last visit. Migraines have been “Stable”. she uses Treximet at onset of migraine. She is requesting Zofran, has previously had Rx for this, but not recently.  She needs refills of all of her medications. She was previously seen by Dr. Hicks who had been refilling her Zoloft and she needs a refill of that. She also needs refill of clonazepam, which she has been prescribed for anxiety to use PRN.     Interval history 8/17/2022:  Patient reports she is overall doing well since last visit. Her topiramate was reduced to 150mg BID. She also had labs done which showed low B12 and  "she started taking a B12 supplement. She feels she is doing better from a cognitive standpoint with these changes. She no longer has significant issues searching for words.   Her headaches were doing well but increased in the last few weeks. She recently changed birth control pills to a lower dose pill and feels that may be causing increased headaches. She just completed her first month of the new medication.  She reports she is struggling to lose weight. She is not sure she is eating the right things or not. She has been doing some research on gut health and hormones.      Interval history 12/28/2022:  Patient reports she is doing well from a neurologic standpoint. She denies any seizures, headaches are currently well controlled.  She is now following with medical weight loss center and working on diet and exercise, has lost some weight over the past few months.  She does note her mother passed away in October, which was very tough on her. She took a leave from work but will return next week.     Interval history 5/22/2023:  Patient reports she is doing well from a neurologic standpoint. She denies any seizures, headaches are currently well controlled.  In February she called the office reporting an episode of vertigo. This occurred after being treated with antibiotics for a sinus infection. After the episode of vertigo she continued to feel \"off\" and had a headache. I sent in dexamethasone for her and this cleared up her symptoms. She has not had a recurrence.   She is currently dealing with an issue in the right foot, following with podiatry.     Interval History 8/4/2023  Since her initial consultation with me in 2021, she has had follow-up with Katina Bowers for the past couple of years. Most recently on 5/22/2023 - no recurrent seizures, headaches were under control. Her topiramate dose was reduced due to paresthesias.      She reports that there have been no seizures.  The main issue has been her migraines. "   She sometimes associate her migraines with her menstrual period or with bad weather. She usually gets a migraine a week prior to her period.  Since May 2023, she has not had a really bad migraines. However, she may have headaches that happens a couple of times a month, which she will take ibuprofen with Coca-cola, which can last 2-3 days. These headaches are different from her migraines, because she has some nausea with limited painful area over her head. Her migraines are holocephalic and very debilitating to the point that she could not function. She had a 5 day migraine headache a week ago, which sumatriptan-naproxen did not abort the headache, she tried ibuprofen, coca-cola, even a migraine ice pack that goes over her head. She did take the five days of dexamethasone 2mg daily which helped to abort the migraine. She literally drinks water all day.      She continues that her cognition is impaired, with word finding difficulty and recall short term information. She was trying to remember something that happened recently. 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 Occasionally, she will have a bit of dizziness or abnormal lightheaded sensation, but it comes on for a while even if she is seated.   She has been on Zoloft since she had her son who is now a grown adult. It was started by her family doctor. She feels that her anxiety is stable, but when she tried to get off of sertraline, but it triggered a constant sensation of being anxious.      Interval history 2/6/2024:  Patient was last seen by Dr. Hearn in August 2023. At that time, he suggested decreasing topiramate (due to ongoing cognitive complaints), but she was hesitant due to concern for potential breakthrough seizures. It was suggested she add magnesium and B2 for migraines. She did not want to add another Rx  med for migraine prevention.     Today, patient reports her migraines have been overall stable. She had some stomach upset with magnesium oxide and is now taking magnesium gluconate. She felt the B2 was helpful, but she ran out and could not find any of this at the pharmacy. The Treximet still helps to alleviate her migraines when she needs it. She is still experiencing some cognitive difficulty and feels it is related to the topiramate. She is now open to considering decreasing the dose a bit, as previously suggested.    Interval history 2024:  Patient reports things have been overall stable since her last visit. We decreased her topiramate last visit to see if that would further assist with cognition. She feels things are about the same. There have not been any events concerning for seizure. Migraines overall well controlled, although sometimes her abortive therapy does not work and she feels she would need a course of dexamethasone. In terms of frequency, things are stable, not increased, even with reducing topiramate. She has been under some stress.     Woman of childbearing age with Epilepsy:  Contraception: Seasonale  Folic acid supplement: No     Prior Epilepsy History:  x     Special Features  Status epilepticus: No  Self Injury Seizures: No  Precipitating Factors: None  Post-ictal state: confusion     Epilepsy Risk Factors:  Abnormal pregnancy: No  Abnormal birth/: No  Abnormal Development: No  Febrile seizures, simple: No  Febrile seizures, complex: No  CNS infection: No  Mental retardation: No  Cerebral palsy: No  Head injury (moderate/severe): No  CNS neoplasm: No  CNS malformation: No  Neurosurgical procedure: No  Stroke: No  CNS autoimmune disorder: No  Alcohol abuse: No  Drug abuse: No  Family history Sz/epilepsy: her son (absence and grand mal seizures) and and one of her twin daughters (grand mal seizure at 4 years old); brother with febrile seizure; one brother with seizure     Prior  AEDs:  medication Max dose Time used Reason to stop   divalpreox     Hair loss   lamotrigine         topiramate         Tegretol (as a child)            PRIOR Migraine trials  Abortives - Frova (side effects), Maxalt and Zomig (no help), advil, Zofran, tylenol, flexerile     Prior workup:  x  Imagin2023 - MRI brain wo  1. Grossly stable nonspecific scattered FLAIR hyperintense foci involving bilateral frontoparietal subcortical white matter which can be seen in migraines. Precocious microangiopathy is within differential consideration. Demyelinating disease is much less favored.  2. Chiari I malformation.     2019 - MRI brain  4-5 tiny foci of T2/FLAIR hyperintensity scattered in the subcortical white matter of both hemispheres.     8/10/2015 - MRI brain ([facility])  Right cerebellar tonsil protrudes through the foramen magnum unchanged from MRI 2010     EEGs:  None available     Past Psychiatric History:  Depression: yes  Anxiety: yes  Psychosis: No    Social History  Living situation:  Lives with family  Work:  , Bachelors degree  Driving:  Yes   reports that she has never smoked. She has never used smokeless tobacco. She reports current alcohol use. She reports that she does not use drugs.      Review of Systems   Constitutional: Negative.  Negative for chills, fatigue and fever.   HENT: Negative.  Negative for hearing loss, tinnitus and trouble swallowing.    Eyes:  Negative for photophobia, pain and visual disturbance.   Respiratory: Negative.  Negative for cough and shortness of breath.    Cardiovascular: Negative.  Negative for chest pain and palpitations.   Gastrointestinal:  Negative for constipation, diarrhea, nausea and vomiting.   Endocrine: Negative.    Genitourinary: Negative.  Negative for difficulty urinating and urgency.   Musculoskeletal: Negative.  Negative for gait problem.   Skin: Negative.  Negative for rash.   Neurological:  Positive for headaches. Negative for  dizziness, tremors, seizures, weakness and numbness.   Hematological: Negative.    Psychiatric/Behavioral:  Negative for confusion and sleep disturbance.      I have personally reviewed the MA's review of systems and made changes as necessary.    Current Outpatient Medications on File Prior to Visit   Medication Sig Dispense Refill    Junel 1/20 1-20 MG-MCG per tablet       magnesium gluconate (MAGONATE) 500 mg tablet Take 500 mg by mouth 2 (two) times a day      norethindrone-ethinyl estradiol (Junel 1/20) 1-20 MG-MCG per tablet TAKE 1 TABLET BY MOUTH EVERY DAY 21 tablet 5    Riboflavin 400 MG TABS Take 1 tablet (400 mg total) by mouth in the morning 30 tablet 5    sertraline (ZOLOFT) 50 mg tablet Take 2.5 tablets (125 mg total) by mouth daily 225 tablet 3    SUMAtriptan-naproxen (TREXIMET)  MG per tablet Take 1 po at onset of migraine, may repeat x 1 in 24hrs 10 tablet 1    topiramate (TOPAMAX) 100 mg tablet TAKE 1 AND 1/2 TABLETS TWICE DAILY 90 tablet 8    dexamethasone (DECADRON) 2 mg tablet Take 1 po daily with food x 3 days (Patient not taking: Reported on 11/27/2024) 3 tablet 1    dicyclomine (BENTYL) 20 mg tablet Take 1 tablet (20 mg total) by mouth every 6 (six) hours (Patient not taking: Reported on 7/30/2024) 90 tablet 1    ketoconazole (NIZORAL) 2 % cream APPLY TO AFFECTED AREAS ON THE NECK AND EARS TWICE A DAY FOR 4 WEEKS THEN AS NEEDED (Patient not taking: Reported on 7/30/2024)      ketoconazole (NIZORAL) 2 % shampoo SHAMPOO 2-3 TIMES WEEKLY (Patient not taking: Reported on 7/30/2024)      ondansetron (Zofran ODT) 4 mg disintegrating tablet Take 1 tablet (4 mg total) by mouth every 6 (six) hours as needed for nausea or vomiting (Patient not taking: Reported on 7/30/2024) 30 tablet 0    pantoprazole (PROTONIX) 40 mg tablet Take 1 tablet (40 mg total) by mouth 2 (two) times a day (Patient not taking: Reported on 11/27/2024) 60 tablet 0     No current facility-administered medications on file prior  "to visit.         Objective   /78 (BP Location: Left arm, Patient Position: Sitting, Cuff Size: Large)   Pulse 89   Temp 97.8 °F (36.6 °C) (Temporal)   Resp 18   Ht 5' 6\" (1.676 m)   Wt 97.6 kg (215 lb 3.2 oz)   SpO2 98%   BMI 34.73 kg/m²     Physical Exam  Constitutional:       Appearance: Normal appearance.   Eyes:      Extraocular Movements: EOM normal.      Pupils: Pupils are equal, round, and reactive to light.   Neurological:      Mental Status: She is alert.      Motor: Motor strength is normal.     Deep Tendon Reflexes: Reflexes are normal and symmetric.   Psychiatric:         Mood and Affect: Mood normal.         Speech: Speech normal.         Behavior: Behavior normal.       Neurological Exam  Mental Status  Alert. Oriented to person, place, time and situation. Speech is normal. Language is fluent with no aphasia. Attention and concentration are normal.    Cranial Nerves  CN II: Visual fields full to confrontation.  CN III, IV, VI: Extraocular movements intact bilaterally. Pupils equal round and reactive to light bilaterally.  CN V: Facial sensation is normal.  CN VII: Full and symmetric facial movement.  CN VIII: Hearing is normal.  CN IX, X: Palate elevates symmetrically  CN XI: Shoulder shrug strength is normal.  CN XII: Tongue midline without atrophy or fasciculations.    Motor   Normal muscle tone. Strength is 5/5 throughout all four extremities.    Sensory  Light touch is normal in upper and lower extremities.     Reflexes  Deep tendon reflexes are 2+ and symmetric in all four extremities.    Coordination  Right: Finger-to-nose normal.Left: Finger-to-nose normal.    Gait  Casual gait is normal including stance, stride, and arm swing.       "

## 2024-11-27 NOTE — PATIENT INSTRUCTIONS
Plan:   1 - reduce topiramate to 100mg in the AM and 100mg in the PM.  We can increase it again if needed (if migraines start to increase in frequency)  2 - continue riboflavin (B2) 400mg daily and magnesium glycinate   3 - continue sertraline at current dose   4 - you may take one clonazepam 0.5mg tab as needed for anxiety attack  5 - can use Treximet at onset of migraine if needed   6 - can use dexamethasone 2mg take 1 tab with food daily x 3 days if your regular migraine meds do not work   7 - avoid using excessive amount of ibuprofen (no more than 2 days a week, as long term use of ibuprofen can result in migraine overuse headaches).  8 - follow-up 4 months

## 2024-11-27 NOTE — ASSESSMENT & PLAN NOTE
Ms. Renetta Rendon is a 49 y.o. 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 She has presumed risk for recurrent seizures based on having had a possible seizure during adulthood and family history of epilepsy. She has episodic migraine headaches as well. She was previously on higher doses of topiramate (presumably increased for migraines), but her migraines have overall been well controlled and she had not had any seizures, therefore we started reducing the topiramate due to cognitive complaints. She is doing a bit better with a lower dose of topiramate.  We will make 1 more reduction, but I would not go down further, as this would be a subtherapeutic dose for epilepsy.     Plan:   1 - reduce topiramate to 100mg in the AM and 100mg in the PM.    2 - call the office if there are any events concerning for seizure  3 - continue sertraline at current dose  4 - you may take one clonazepam 0.5mg tab as needed for anxiety attack

## 2024-11-27 NOTE — ASSESSMENT & PLAN NOTE
Migraines have been a bit more frequent lately, but she feels this is due to specific stressors recently.  We are going to reduce topiramate again, but discussed if migraine frequency increases, we can go back to prior dosing of topiramate.     Plan:  1 - topiramate as above  2 - continue riboflavin and magnesium glycinate  3 -can continue Treximet at onset of migraine if needed  4 -can use dexamethasone 2 mg take 1 tab with food daily x 3 days if you are regular migraine meds do not work  5 -avoid using excessive amount of ibuprofen (no more than 2 days a week, as long-term use of ibuprofen can result in medication overuse headaches/rebound headaches)

## 2024-12-12 ENCOUNTER — NURSE TRIAGE (OUTPATIENT)
Age: 49
End: 2024-12-12

## 2024-12-12 NOTE — TELEPHONE ENCOUNTER
"T/C to patient to triage symptoms.  Patient states that since this morning she has had vertigo off and on all day.  Symptoms are worse when standing, aggravated by changing position and moving her head. States that she has had a feeling of vertigo briefly in the past but never like this.  Feels like the room is spinning.  She is also having jaw pain.  She is scheduled to have a root canal tomorrow but doesn't know if that is related.  No available office appointments today.  Advised patient that she should be evaluated at either an urgent care or ER today.  Patient states that her  is an ER physician and he will be home around 6 pm.  She will have her  evaluate her and determine what she is going to do.  Instructed that until her  gets home, she should lay down with her feet elevated, have a salty snack and drink 8 oz of fluid every 20-30 minutes until he gets home to see if that helps her symptoms.  Understanding verbalized.     Instructed patient to call back if symptoms worsen or if she would like to schedule an appointment in the office.     Reason for Disposition   Spinning or tilting sensation (vertigo) present now    Answer Assessment - Initial Assessment Questions  1. DESCRIPTION: \"Describe your dizziness.\"      Feels like the room is spinning/tilting  2. LIGHTHEADED: \"Do you feel lightheaded?\" (e.g., somewhat faint, woozy, weak upon standing)      Denies  3. VERTIGO: \"Do you feel like either you or the room is spinning or tilting?\" (i.e., vertigo)      Room is spinning   4. SEVERITY: \"How bad is it?\"  \"Do you feel like you are going to faint?\" \"Can you stand and walk?\"      Varies from mild to severe  5. ONSET:  \"When did the dizziness begin?\"      This morning  6. AGGRAVATING FACTORS: \"Does anything make it worse?\" (e.g., standing, change in head position)      Standing, head position  7. HEART RATE: \"Can you tell me your heart rate?\" \"How many beats in 15 seconds?\"  (Note: Not all " "patients can do this.)        85  8. CAUSE: \"What do you think is causing the dizziness?\" (e.g., decreased fluids or food, diarrhea, emotional distress, heat exposure, new medicine, sudden standing, vomiting; unknown)      Unsure  9. RECURRENT SYMPTOM: \"Have you had dizziness before?\" If Yes, ask: \"When was the last time?\" \"What happened that time?\"      Has had this in the past briefly but not like this.  10. OTHER SYMPTOMS: \"Do you have any other symptoms?\" (e.g., fever, chest pain, vomiting, diarrhea, bleeding)        Sinus congestion, jaw pain from a tooth that needs a root canal.  11. PREGNANCY: \"Is there any chance you are pregnant?\" \"When was your last menstrual period?\"        Denies    Protocols used: Dizziness-Adult-[place]    "

## 2024-12-12 NOTE — TELEPHONE ENCOUNTER
Regarding: Vertigo  ----- Message from Anna WINCHESTER sent at 12/12/2024  4:08 PM EST -----  Patient called in regards to having vertigo. Patient stated it stated this morning and thought it was getting a little better until she got home from work and sat down on the  and it got worst, Patient is concerned to lay down or to do anything.

## 2024-12-17 ENCOUNTER — PATIENT MESSAGE (OUTPATIENT)
Dept: FAMILY MEDICINE CLINIC | Facility: CLINIC | Age: 49
End: 2024-12-17

## 2024-12-17 NOTE — PATIENT COMMUNICATION
Patient called in to request an order for an xray to be placed. She fell on a dog gate and hurt her L foot, large toe and pad underfoot.     Please advise and call pt back to let her know if her request can be accommodated.

## 2024-12-19 ENCOUNTER — OFFICE VISIT (OUTPATIENT)
Age: 49
End: 2024-12-19
Payer: COMMERCIAL

## 2024-12-19 DIAGNOSIS — S92.415A CLOSED NONDISPLACED FRACTURE OF PROXIMAL PHALANX OF LEFT GREAT TOE, INITIAL ENCOUNTER: Primary | ICD-10-CM

## 2024-12-19 DIAGNOSIS — R10.2 PELVIC PAIN IN FEMALE: ICD-10-CM

## 2024-12-19 DIAGNOSIS — M79.672 PAIN IN LEFT FOOT: ICD-10-CM

## 2024-12-19 PROCEDURE — 28490 TREAT BIG TOE FRACTURE: CPT | Performed by: PODIATRIST

## 2024-12-19 PROCEDURE — 99214 OFFICE O/P EST MOD 30 MIN: CPT | Performed by: PODIATRIST

## 2024-12-19 RX ORDER — NORETHINDRONE ACETATE AND ETHINYL ESTRADIOL 1; 20 MG/1; UG/1
1 TABLET ORAL DAILY
Qty: 63 TABLET | Refills: 1 | Status: SHIPPED | OUTPATIENT
Start: 2024-12-19

## 2024-12-19 RX ORDER — MELOXICAM 15 MG/1
15 TABLET ORAL DAILY
Qty: 30 TABLET | Refills: 1 | Status: SHIPPED | OUTPATIENT
Start: 2024-12-19

## 2024-12-19 NOTE — PROGRESS NOTES
Name: Renetta Rendon      : 1975      MRN: [id number]  Encounter Provider: Antelmo Lucas DPM  Encounter Date: 2024   Encounter department: [facility] PODIATRY [address]  :  Assessment & Plan        History of Present Illness   HPI  Renetta Rendon is a 49 y.o. female who presents       Review of Systems       Objective   There were no vitals taken for this visit.     Physical Exam

## 2024-12-19 NOTE — PROGRESS NOTES
Name: Renetta Rendon      : 1975      MRN: [id number]  Encounter Provider: Antelmo Lucas DPM  Encounter Date: 2024   Encounter department: [facility] [place]  :  Assessment & Plan  Closed nondisplaced fracture of proximal phalanx of left great toe, initial encounter    Orders:    XR foot 3+ vw left; Future    meloxicam (Mobic) 15 mg tablet; Take 1 tablet (15 mg total) by mouth daily    Pain in left foot    Orders:    meloxicam (Mobic) 15 mg tablet; Take 1 tablet (15 mg total) by mouth daily    - Carlos taping demonstrated, thankfully she already has a cam boot at home  - She is to mobilize in this over the next 2 to 6 weeks depending on her pain  - We did discuss rigid shoes and carlos taping Rx given for Mobic for management of her inflammatory pain  - She is to ice more  - She is to return in 8 weeks if she is to having substantial pain we can discuss fracture fragment excision    Orthopedic injury treatment    Date/Time: 2024 3:00 PM    Performed by: Antelmo Lucas DPM  Authorized by: Antelmo Lucas DPM    Patient Location:  [facility] Protocol:  Consent: Verbal consent obtained.  Risks and benefits: risks, benefits and alternatives were discussed  Consent given by: patient  Patient understanding: patient states understanding of the procedure being performed  Patient consent: the patient's understanding of the procedure matches consent given  Patient identity confirmed: verbally with patient    Injury location:  Toe  Location details:  Left great toe  Injury type:  Fracture  Fracture type: proximal phalanx    Neurovascular status: Neurovascularly intact    Manipulation performed?: No    Neurovascular status: Neurovascularly intact    Patient tolerance:  Patient tolerated the procedure well with no immediate complications   CAM boot, carlos taping.         History of Present Illness   HPI  Renetta Rendon is a 49 y.o.  female who presents evaluation management of her left foot.  She fell over a dog gate and it hurts.  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     Physical Exam ecchymosis substantially to the left big toe.  Range of motion intact but limited.  There is pain palpation on the medial base of the left foot.  There is some positive paresthesias to the left foot but there is substanial bruising and ecchymosis pulses palpable

## 2025-01-28 ENCOUNTER — TELEPHONE (OUTPATIENT)
Age: 50
End: 2025-01-28

## 2025-01-28 ENCOUNTER — TELEPHONE (OUTPATIENT)
Dept: PODIATRY | Facility: CLINIC | Age: 50
End: 2025-01-28

## 2025-01-28 DIAGNOSIS — S92.415A CLOSED NONDISPLACED FRACTURE OF PROXIMAL PHALANX OF LEFT GREAT TOE, INITIAL ENCOUNTER: Primary | ICD-10-CM

## 2025-01-28 NOTE — TELEPHONE ENCOUNTER
Renetta calling in again. She understands she should not go for an xray now. She does have questions on what the excision is and would like more details on this. Please return call. Thank you

## 2025-01-30 ENCOUNTER — HOSPITAL ENCOUNTER (OUTPATIENT)
Dept: CT IMAGING | Facility: HOSPITAL | Age: 50
End: 2025-01-30
Attending: PODIATRIST
Payer: COMMERCIAL

## 2025-01-30 ENCOUNTER — OFFICE VISIT (OUTPATIENT)
Age: 50
End: 2025-01-30
Payer: COMMERCIAL

## 2025-01-30 VITALS — HEIGHT: 66 IN | BODY MASS INDEX: 34.55 KG/M2 | WEIGHT: 215 LBS

## 2025-01-30 DIAGNOSIS — S92.415G CLOSED NONDISPLACED FRACTURE OF PROXIMAL PHALANX OF LEFT GREAT TOE WITH DELAYED HEALING, SUBSEQUENT ENCOUNTER: ICD-10-CM

## 2025-01-30 DIAGNOSIS — S92.415G CLOSED NONDISPLACED FRACTURE OF PROXIMAL PHALANX OF LEFT GREAT TOE WITH DELAYED HEALING, SUBSEQUENT ENCOUNTER: Primary | ICD-10-CM

## 2025-01-30 PROCEDURE — 99213 OFFICE O/P EST LOW 20 MIN: CPT | Performed by: PODIATRIST

## 2025-01-30 PROCEDURE — 73700 CT LOWER EXTREMITY W/O DYE: CPT

## 2025-01-30 RX ORDER — CETIRIZINE HYDROCHLORIDE 10 MG/1
TABLET ORAL
COMMUNITY

## 2025-01-30 NOTE — PROGRESS NOTES
Name: Renetta Rendon      : 1975      MRN: [id number]  Encounter Provider: Antelmo Lucas DPM  Encounter Date: 2025   Encounter department: [facility] P[facility]IATRY [place]  :  Assessment & Plan  Closed nondisplaced fracture of proximal phalanx of left great toe with delayed healing, subsequent encounter    Orders:    CT lower extremity wo contrast left; Future    -We will obtain a CT to evaluate any form of osseous bridging here, her pain is atypical for the course  - She is having increased pain compared to others with similar injuries, I doubt that with the size of this fragment and her extent of pain that there is been much if any osseous bridging occurring  - We did discuss options everything ranging from waiting until this becomes a nonunion for bone stimulator versus fracture fragment excision versus continuing the same course  - At this point in time we will obtain a CT to evaluate any osseous bridging across the area and consider fracture fragment excision  - She is to return in 1 to 2 weeks following CT    History of Present Illness   HPI  Renetta Rendon is a 49 y.o. female who presents evaluation management of her left big toe, at most she will states she is around 20% better improvement in her pain since initial injury.  She is really tired of walking around and open toed shoes, attempting use of the boot and getting snow in her devices.  She is trialed a carbon fiber footplate without success.  And she is a bit frustrated with the continued pain here.      Review of Systems   Constitutional:  Negative for chills and fever.   HENT:  Negative for ear pain and sore throat.    Eyes:  Negative for pain and visual disturbance.   Respiratory:  Negative for cough and shortness of breath.    Cardiovascular:  Negative for chest pain and palpitations.   Gastrointestinal:  Negative for abdominal pain and vomiting.   Genitourinary:  Negative for dysuria and hematuria.  "  Musculoskeletal:  Negative for arthralgias and back pain.   Skin:  Negative for color change and rash.   Neurological:  Negative for seizures and syncope.   All other systems reviewed and are negative.         Objective   Ht 5' 6\" (1.676 m)   Wt 97.5 kg (215 lb)   BMI 34.70 kg/m²      Physical Exam  Vitals reviewed.   Constitutional:       Appearance: Normal appearance.   HENT:      Head: Normocephalic and atraumatic.      Nose: Nose normal.      Mouth/Throat:      Mouth: Mucous membranes are moist.   Eyes:      Pupils: Pupils are equal, round, and reactive to light.   Pulmonary:      Effort: Pulmonary effort is normal.   Skin:     Comments: Continued soft tissue swelling of the left first MTPJ, there is pain with palpation at the direct base of the medial proximal phalanx.  Pain with plantarflexion.   Neurological:      General: No focal deficit present.      Mental Status: She is alert and oriented to person, place, and time. Mental status is at baseline.           "

## 2025-01-31 ENCOUNTER — RESULTS FOLLOW-UP (OUTPATIENT)
Age: 50
End: 2025-01-31

## 2025-01-31 NOTE — TELEPHONE ENCOUNTER
----- Message from Antelmo Lucas DPM sent at 1/31/2025  8:02 AM EST -----  Could one of you all please call her and let her know that there is really been no evidence of healing across the fracture site at this point.  There is no visible bony bridging on the CT. If she would like to come back in earlier and get scheduled for the fragment excision, I can make that happen.  ----- Message -----  From: Interface, Radiology Results In  Sent: 1/30/2025   5:23 PM EST  To: Antelmo Lucas DPM

## 2025-02-03 ENCOUNTER — TELEPHONE (OUTPATIENT)
Age: 50
End: 2025-02-03

## 2025-02-03 NOTE — TELEPHONE ENCOUNTER
Caller: Renetta Rendon    Doctor: Dr. Lucas    Reason for call: Has some questions regarding her surgery.  She needs to know approx how long she will be out of work for.  Also she would like to schedule her surgery near a weekend.  Can someone please get back to her?  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  Thank you.     Call back#: [phone]

## 2025-02-04 NOTE — TELEPHONE ENCOUNTER
LVOM for patient that all questions listed will be able to be better answered in office at her next appt. If going forward with sx is an option, all details and paperwork will be handled in office. Any further questions or concerns I advised pt to reach out to me directly . Phone # provided.

## 2025-02-11 ENCOUNTER — PREP FOR PROCEDURE (OUTPATIENT)
Age: 50
End: 2025-02-11

## 2025-02-11 ENCOUNTER — OFFICE VISIT (OUTPATIENT)
Age: 50
End: 2025-02-11
Payer: COMMERCIAL

## 2025-02-11 VITALS — BODY MASS INDEX: 34.55 KG/M2 | HEIGHT: 66 IN | WEIGHT: 215 LBS

## 2025-02-11 DIAGNOSIS — S92.902K CLOSED FRACTURE OF LEFT FOOT WITH NONUNION, SUBSEQUENT ENCOUNTER: ICD-10-CM

## 2025-02-11 DIAGNOSIS — S92.415G CLOSED NONDISPLACED FRACTURE OF PROXIMAL PHALANX OF LEFT GREAT TOE WITH DELAYED HEALING, SUBSEQUENT ENCOUNTER: Primary | ICD-10-CM

## 2025-02-11 DIAGNOSIS — B35.1 ONYCHOMYCOSIS: Primary | ICD-10-CM

## 2025-02-11 PROCEDURE — 99214 OFFICE O/P EST MOD 30 MIN: CPT | Performed by: PODIATRIST

## 2025-02-11 RX ORDER — CEFAZOLIN SODIUM 2 G/50ML
2000 SOLUTION INTRAVENOUS ONCE
OUTPATIENT
Start: 2025-02-24 | End: 2025-02-11

## 2025-02-11 RX ORDER — CHLORHEXIDINE GLUCONATE ORAL RINSE 1.2 MG/ML
15 SOLUTION DENTAL ONCE
OUTPATIENT
Start: 2025-02-11 | End: 2025-02-11

## 2025-02-11 RX ORDER — CHLORHEXIDINE GLUCONATE 40 MG/ML
SOLUTION TOPICAL DAILY PRN
OUTPATIENT
Start: 2025-02-11

## 2025-02-11 NOTE — PROGRESS NOTES
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, she is still having substantial pain  - She has tried rigid shoes immobilization and there has been absolutely no healing in over 6 weeks  - This is likely due to the location and actual nature of the fracture with the motion along the toe  - We discussed options such as waiting until it becomes a true nonunion at 90 days and then we could get a bone stimulator and then have slower consolidation of fracture fragment over the next 2 to 3 months we could take her to surgery for fracture fragment excision  - She will be weight-bear as tolerated surgical shoe for 2 to 4 weeks postsurgery, as soon as sutures are removed she may return to a normal shoe pending swelling and pain  - She will need appointments at 1 week, 2 weeks, 4 weeks postop.    Patient was counseled and educated on the condition and the diagnosis. The diagnosis, treatment options and prognosis were discussed with the patient.  The patient failed conservative care at this time and wished to proceed with surgical treatment.  Explained surgical details and post-op course.  Discussed all risks and complications related to the patient's condition and surgery.  The benefits of surgery were also discussed.  The patient understood that the surgery would not guarantee desirable outcome.  All questions and concerns were addressed and the consent was signed.        History of Present Illness   HPI  Renetta Rendon is a 49 y.o. female who presents evaluation management of her left foot, ambulating in her boot and she reports no improvement in her  "pain here to review her CT and consider treatment.          Review of Systems   Constitutional:  Negative for chills and fever.   HENT:  Negative for ear pain and sore throat.    Eyes:  Negative for pain and visual disturbance.   Respiratory:  Negative for cough and shortness of breath.    Cardiovascular:  Negative for chest pain and palpitations.   Gastrointestinal:  Negative for abdominal pain and vomiting.   Genitourinary:  Negative for dysuria and hematuria.   Musculoskeletal:  Negative for arthralgias and back pain.   Skin:  Negative for color change and rash.   Neurological:  Negative for seizures and syncope.   All other systems reviewed and are negative.         Objective   Ht 5' 6\" (1.676 m)   Wt 97.5 kg (215 lb)   BMI 34.70 kg/m²      Physical Exam  Vitals reviewed.   Constitutional:       Appearance: Normal appearance.   HENT:      Head: Normocephalic and atraumatic.      Nose: Nose normal.      Mouth/Throat:      Mouth: Mucous membranes are moist.   Eyes:      Pupils: Pupils are equal, round, and reactive to light.   Abdominal:      General: Abdomen is flat.   Skin:     Comments: TTP with ROM of the left 1st MTPJ. Pulses palpable.  Tenderness palpation along the fracture fragment along the base of the left proximal phalanx   Neurological:      Mental Status: She is alert.           "

## 2025-02-12 ENCOUNTER — TELEPHONE (OUTPATIENT)
Age: 50
End: 2025-02-12

## 2025-02-12 NOTE — TELEPHONE ENCOUNTER
Spoke to pt about upcoming sx. Date was discussed, post ops + pre-op appt made. Patient expressed verbal understanding of all instructions and recommendations. Any further questions or concerns I advised pt to reach out. Thank you

## 2025-02-13 RX ORDER — OMEGA-3S/DHA/EPA/FISH OIL/D3 300MG-1000
400 CAPSULE ORAL DAILY
COMMUNITY

## 2025-02-13 NOTE — PRE-PROCEDURE INSTRUCTIONS
Pre-Surgery Instructions:   Medication Instructions    cetirizine (ZyrTEC Allergy) 10 mg tablet Uses PRN- OK to take day of surgery    cholecalciferol (VITAMIN D3) 400 units tablet Hold day of surgery.    magnesium gluconate (MAGONATE) 500 mg tablet Hold day of surgery.    melatonin 3 mg Stop taking 7 days prior to surgery.    norethindrone-ethinyl estradiol (Junel 1/20) 1-20 MG-MCG per tablet Take night before surgery    ondansetron (Zofran ODT) 4 mg disintegrating tablet Uses PRN- OK to take day of surgery    Riboflavin 400 MG TABS Hold day of surgery.    sertraline (ZOLOFT) 50 mg tablet Take day of surgery.    SUMAtriptan-naproxen (TREXIMET)  MG per tablet Stop taking 3 days prior to surgery.    topiramate (TOPAMAX) 100 mg tablet Take day of surgery.   Medication instructions for day surgery reviewed. Please use only a sip of water to take your instructed medications. Avoid all over the counter vitamins, supplements and NSAIDS for one week prior to surgery per anesthesia guidelines. Tylenol is ok to take as needed.     You will receive a call one business day prior to surgery with an arrival time and hospital directions. If your surgery is scheduled on a Monday, the hospital will be calling you on the Friday prior to your surgery. If you have not heard from anyone by 8pm, please call the hospital supervisor through the hospital  at [phone]. ([place] [phone] or [place] [phone]).    Do not eat or drink anything after midnight the night before your surgery, including candy, mints, lifesavers, or chewing gum. Do not drink alcohol 24hrs before your surgery. Try not to smoke at least 24hrs before your surgery.       Follow the pre surgery showering instructions as listed in the “My Surgical Experience Booklet” or otherwise provided by your surgeon's office. Do not use a blade to shave the surgical area 1 week before surgery. It is okay to use a clean electric clippers up to 24 hours  before surgery. Do not apply any lotions, creams, including makeup, cologne, deodorant, or perfumes after showering on the day of your surgery. Do not use dry shampoo, hair spray, hair gel, or any type of hair products.     No contact lenses, eye make-up, or artificial eyelashes. Remove nail polish, including gel polish, and any artificial, gel, or acrylic nails if possible. Remove all jewelry including rings and body piercing jewelry.     Wear causal clothing that is easy to take on and off. Consider your type of surgery.    Keep any valuables, jewelry, piercings at home. Please bring any specially ordered equipment (sling, braces) if indicated.    Arrange for a responsible person to drive you to and from the hospital on the day of your surgery. Please confirm the visitor policy for the day of your procedure when you receive your phone call with an arrival time.     Call the surgeon's office with any new illnesses, exposures, or additional questions prior to surgery.    Please reference your “My Surgical Experience Booklet” for additional information to prepare for your upcoming surgery.

## 2025-02-14 ENCOUNTER — APPOINTMENT (OUTPATIENT)
Dept: LAB | Facility: MEDICAL CENTER | Age: 50
End: 2025-02-14
Payer: COMMERCIAL

## 2025-02-14 DIAGNOSIS — S92.902K CLOSED FRACTURE OF LEFT FOOT WITH NONUNION, SUBSEQUENT ENCOUNTER: ICD-10-CM

## 2025-02-14 DIAGNOSIS — G25.81 RESTLESS LEG: ICD-10-CM

## 2025-02-14 LAB
ANION GAP SERPL CALCULATED.3IONS-SCNC: 11 MMOL/L (ref 4–13)
BUN SERPL-MCNC: 13 MG/DL (ref 5–25)
CALCIUM SERPL-MCNC: 9.4 MG/DL (ref 8.4–10.2)
CHLORIDE SERPL-SCNC: 107 MMOL/L (ref 96–108)
CO2 SERPL-SCNC: 22 MMOL/L (ref 21–32)
CREAT SERPL-MCNC: 0.64 MG/DL (ref 0.6–1.3)
FERRITIN SERPL-MCNC: 59 NG/ML (ref 11–307)
GFR SERPL CREATININE-BSD FRML MDRD: 105 ML/MIN/1.73SQ M
GLUCOSE P FAST SERPL-MCNC: 94 MG/DL (ref 65–99)
IRON SATN MFR SERPL: 32 % (ref 15–50)
IRON SERPL-MCNC: 124 UG/DL (ref 50–212)
POTASSIUM SERPL-SCNC: 4.3 MMOL/L (ref 3.5–5.3)
SODIUM SERPL-SCNC: 140 MMOL/L (ref 135–147)
TIBC SERPL-MCNC: 390.6 UG/DL (ref 250–450)
TRANSFERRIN SERPL-MCNC: 279 MG/DL (ref 203–362)
UIBC SERPL-MCNC: 267 UG/DL (ref 155–355)

## 2025-02-14 PROCEDURE — 80048 BASIC METABOLIC PNL TOTAL CA: CPT

## 2025-02-14 PROCEDURE — 36415 COLL VENOUS BLD VENIPUNCTURE: CPT

## 2025-02-14 PROCEDURE — 82728 ASSAY OF FERRITIN: CPT

## 2025-02-14 PROCEDURE — 83540 ASSAY OF IRON: CPT

## 2025-02-14 PROCEDURE — 83550 IRON BINDING TEST: CPT

## 2025-02-16 ENCOUNTER — RESULTS FOLLOW-UP (OUTPATIENT)
Dept: FAMILY MEDICINE CLINIC | Facility: CLINIC | Age: 50
End: 2025-02-16

## 2025-02-16 DIAGNOSIS — G25.81 RESTLESS LEG: Primary | ICD-10-CM

## 2025-02-16 RX ORDER — FERROUS SULFATE 324(65)MG
324 TABLET, DELAYED RELEASE (ENTERIC COATED) ORAL
Qty: 90 TABLET | Refills: 0 | Status: SHIPPED | OUTPATIENT
Start: 2025-02-16 | End: 2025-04-22

## 2025-02-17 ENCOUNTER — CONSULT (OUTPATIENT)
Dept: FAMILY MEDICINE CLINIC | Facility: CLINIC | Age: 50
End: 2025-02-17
Payer: COMMERCIAL

## 2025-02-17 VITALS
DIASTOLIC BLOOD PRESSURE: 88 MMHG | OXYGEN SATURATION: 98 % | BODY MASS INDEX: 34.68 KG/M2 | HEIGHT: 66 IN | HEART RATE: 122 BPM | TEMPERATURE: 97.3 F | SYSTOLIC BLOOD PRESSURE: 130 MMHG | WEIGHT: 215.8 LBS

## 2025-02-17 DIAGNOSIS — S92.415G CLOSED NONDISPLACED FRACTURE OF PROXIMAL PHALANX OF LEFT GREAT TOE WITH DELAYED HEALING, SUBSEQUENT ENCOUNTER: Primary | ICD-10-CM

## 2025-02-17 PROCEDURE — 99243 OFF/OP CNSLTJ NEW/EST LOW 30: CPT | Performed by: FAMILY MEDICINE

## 2025-02-17 NOTE — PROGRESS NOTES
"PRE-OPERATIVE EXAMINATION  Renetta Rendon  1975    Renetta Rendon is a 49 y.o. female with past medical history of Chiari I malformation, Boorse syndrome, epilepsy, obesity who is planning to undergo fracture fragment excision left medial hallux  under general anesthesia by Dr. Lucas on 2/24/25. 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 Patient has not had complications with anesthesia in the past.    ROS:   Chest pain: no   Shortness of breath: no  Shortness of breath with exertion: no  Orthopnea: no  Dizziness: no  Unexplained weight change: no    PMH:  CAD: no  HTN: no  CKD: no  DM: no on insulin: no  History of CVA: no    She  reports that she has never smoked. She has never used smokeless tobacco. She reports that she does not currently use alcohol. She reports that she does not use drugs.    /88 (BP Location: Left arm, Patient Position: Sitting, Cuff Size: Large)   Pulse (!) 122   Temp (!) 97.3 °F (36.3 °C) (Tympanic)   Ht 5' 6\" (1.676 m)   Wt 97.9 kg (215 lb 12.8 oz)   LMP 02/12/2025 (Exact Date)   SpO2 98%   BMI 34.83 kg/m²   Physical Exam  Vitals reviewed.   Constitutional:       General: She is not in acute distress.     Appearance: She is well-developed.   HENT:      Head: Normocephalic and atraumatic.      Right Ear: Tympanic membrane, ear canal and external ear normal.      Left Ear: Tympanic membrane, ear canal and external ear normal.      Nose: Nose normal.      Mouth/Throat:      Mouth: Mucous membranes are moist.      Pharynx: Oropharynx is clear.   Eyes:      Conjunctiva/sclera: Conjunctivae normal.      Pupils: Pupils are equal, round, and reactive to light.   Cardiovascular:      Rate and Rhythm: Normal rate and regular rhythm.      Heart sounds: No murmur heard.  Pulmonary:      Effort: Pulmonary effort is normal. No respiratory distress.      Breath sounds: Normal breath sounds.   Abdominal:      Palpations: " Abdomen is soft.      Tenderness: There is no abdominal tenderness.   Musculoskeletal:      Cervical back: Neck supple.      Right lower leg: No edema.      Left lower leg: No edema.   Lymphadenopathy:      Cervical: No cervical adenopathy.   Skin:     General: Skin is warm and dry.   Neurological:      General: No focal deficit present.      Mental Status: She is alert and oriented to person, place, and time.   Psychiatric:         Mood and Affect: Mood normal.         Behavior: Behavior normal.         Revised Cardiac Risk Index (RCRI) for Pre-Operative Risk   (estimates risk of cardiac complications after noncardiac surgery)    High-risk surgery: No 0   History of ischemic heart disease: No 0   History of CHF: No 0   History of cerebrovascular disease: No 0   Pre-operative treatment with insulin: No 0   Pre-operative creatinine >2 mg/dL: No 0     RCRI Scorin points: Class I Risk, 3.9% 30-day risk of death, MI, or cardiac arrest    Lab Results   Component Value Date    CREATININE 0.64 2025       Renetta was seen today for pre-op exam.    Diagnoses and all orders for this visit:    Closed nondisplaced fracture of proximal phalanx of left great toe with delayed healing, subsequent encounter      Recommendations:  Renetta Rendon is undergoing an elective Low Risk surgery, fracture fragment excision left medial hallux. She is RCRI class I risk (0 points) with 3.9% 30-day risk of death, MI, or cardiac arrest. She may proceed with surgery as planned without further workup.

## 2025-02-18 PROBLEM — S92.415G: Status: ACTIVE | Noted: 2025-02-18

## 2025-02-24 ENCOUNTER — APPOINTMENT (OUTPATIENT)
Dept: RADIOLOGY | Facility: HOSPITAL | Age: 50
End: 2025-02-24
Payer: COMMERCIAL

## 2025-02-24 ENCOUNTER — ANESTHESIA EVENT (OUTPATIENT)
Dept: PERIOP | Facility: HOSPITAL | Age: 50
End: 2025-02-24
Payer: COMMERCIAL

## 2025-02-24 ENCOUNTER — HOSPITAL ENCOUNTER (OUTPATIENT)
Facility: HOSPITAL | Age: 50
Setting detail: OUTPATIENT SURGERY
Discharge: HOME | End: 2025-02-24
Attending: PODIATRIST | Admitting: PODIATRIST
Payer: COMMERCIAL

## 2025-02-24 ENCOUNTER — ANESTHESIA (OUTPATIENT)
Dept: PERIOP | Facility: HOSPITAL | Age: 50
End: 2025-02-24
Payer: COMMERCIAL

## 2025-02-24 VITALS
OXYGEN SATURATION: 100 % | DIASTOLIC BLOOD PRESSURE: 56 MMHG | SYSTOLIC BLOOD PRESSURE: 109 MMHG | BODY MASS INDEX: 34.55 KG/M2 | HEIGHT: 66 IN | TEMPERATURE: 97.1 F | HEART RATE: 77 BPM | WEIGHT: 215 LBS | RESPIRATION RATE: 20 BRPM

## 2025-02-24 DIAGNOSIS — G89.18 POST-OPERATIVE PAIN: Primary | ICD-10-CM

## 2025-02-24 LAB
EXT PREGNANCY TEST URINE: NEGATIVE
EXT. CONTROL: NORMAL

## 2025-02-24 PROCEDURE — NC001 PR NO CHARGE: Performed by: STUDENT IN AN ORGANIZED HEALTH CARE EDUCATION/TRAINING PROGRAM

## 2025-02-24 PROCEDURE — 99024 POSTOP FOLLOW-UP VISIT: CPT | Performed by: PODIATRIST

## 2025-02-24 PROCEDURE — 28505 TREAT BIG TOE FRACTURE: CPT | Performed by: PODIATRIST

## 2025-02-24 PROCEDURE — 73630 X-RAY EXAM OF FOOT: CPT

## 2025-02-24 PROCEDURE — 76000 FLUOROSCOPY <1 HR PHYS/QHP: CPT

## 2025-02-24 PROCEDURE — 81025 URINE PREGNANCY TEST: CPT | Performed by: PODIATRIST

## 2025-02-24 RX ORDER — MAGNESIUM HYDROXIDE 1200 MG/15ML
LIQUID ORAL AS NEEDED
Status: DISCONTINUED | OUTPATIENT
Start: 2025-02-24 | End: 2025-02-24 | Stop reason: HOSPADM

## 2025-02-24 RX ORDER — CHLORHEXIDINE GLUCONATE 40 MG/ML
SOLUTION TOPICAL DAILY PRN
Status: DISCONTINUED | OUTPATIENT
Start: 2025-02-24 | End: 2025-02-24 | Stop reason: HOSPADM

## 2025-02-24 RX ORDER — PROPOFOL 10 MG/ML
INJECTION, EMULSION INTRAVENOUS CONTINUOUS PRN
Status: DISCONTINUED | OUTPATIENT
Start: 2025-02-24 | End: 2025-02-24

## 2025-02-24 RX ORDER — ONDANSETRON 2 MG/ML
INJECTION INTRAMUSCULAR; INTRAVENOUS AS NEEDED
Status: DISCONTINUED | OUTPATIENT
Start: 2025-02-24 | End: 2025-02-24

## 2025-02-24 RX ORDER — CHLORHEXIDINE GLUCONATE ORAL RINSE 1.2 MG/ML
15 SOLUTION DENTAL ONCE
Status: COMPLETED | OUTPATIENT
Start: 2025-02-24 | End: 2025-02-24

## 2025-02-24 RX ORDER — CEFAZOLIN SODIUM 2 G/50ML
2000 SOLUTION INTRAVENOUS ONCE
Status: COMPLETED | OUTPATIENT
Start: 2025-02-24 | End: 2025-02-24

## 2025-02-24 RX ORDER — LIDOCAINE HYDROCHLORIDE 10 MG/ML
INJECTION, SOLUTION EPIDURAL; INFILTRATION; INTRACAUDAL; PERINEURAL AS NEEDED
Status: DISCONTINUED | OUTPATIENT
Start: 2025-02-24 | End: 2025-02-24 | Stop reason: HOSPADM

## 2025-02-24 RX ORDER — SODIUM CHLORIDE, SODIUM LACTATE, POTASSIUM CHLORIDE, CALCIUM CHLORIDE 600; 310; 30; 20 MG/100ML; MG/100ML; MG/100ML; MG/100ML
125 INJECTION, SOLUTION INTRAVENOUS CONTINUOUS
Status: DISCONTINUED | OUTPATIENT
Start: 2025-02-24 | End: 2025-02-24 | Stop reason: HOSPADM

## 2025-02-24 RX ORDER — OXYCODONE AND ACETAMINOPHEN 5; 325 MG/1; MG/1
1 TABLET ORAL EVERY 4 HOURS PRN
Refills: 0 | Status: DISCONTINUED | OUTPATIENT
Start: 2025-02-24 | End: 2025-02-24 | Stop reason: HOSPADM

## 2025-02-24 RX ORDER — ACETAMINOPHEN 325 MG/1
650 TABLET ORAL EVERY 4 HOURS PRN
Status: DISCONTINUED | OUTPATIENT
Start: 2025-02-24 | End: 2025-02-24 | Stop reason: HOSPADM

## 2025-02-24 RX ORDER — PROPOFOL 10 MG/ML
INJECTION, EMULSION INTRAVENOUS AS NEEDED
Status: DISCONTINUED | OUTPATIENT
Start: 2025-02-24 | End: 2025-02-24

## 2025-02-24 RX ORDER — MIDAZOLAM HYDROCHLORIDE 2 MG/2ML
INJECTION, SOLUTION INTRAMUSCULAR; INTRAVENOUS AS NEEDED
Status: DISCONTINUED | OUTPATIENT
Start: 2025-02-24 | End: 2025-02-24

## 2025-02-24 RX ORDER — FENTANYL CITRATE 50 UG/ML
INJECTION, SOLUTION INTRAMUSCULAR; INTRAVENOUS AS NEEDED
Status: DISCONTINUED | OUTPATIENT
Start: 2025-02-24 | End: 2025-02-24

## 2025-02-24 RX ORDER — OXYCODONE AND ACETAMINOPHEN 5; 325 MG/1; MG/1
1 TABLET ORAL EVERY 4 HOURS PRN
Qty: 10 TABLET | Refills: 0 | Status: SHIPPED | OUTPATIENT
Start: 2025-02-24

## 2025-02-24 RX ORDER — SODIUM CHLORIDE, SODIUM LACTATE, POTASSIUM CHLORIDE, CALCIUM CHLORIDE 600; 310; 30; 20 MG/100ML; MG/100ML; MG/100ML; MG/100ML
INJECTION, SOLUTION INTRAVENOUS CONTINUOUS PRN
Status: DISCONTINUED | OUTPATIENT
Start: 2025-02-24 | End: 2025-02-24

## 2025-02-24 RX ADMIN — FENTANYL CITRATE 50 MCG: 50 INJECTION, SOLUTION INTRAMUSCULAR; INTRAVENOUS at 13:45

## 2025-02-24 RX ADMIN — PROPOFOL 100 MG: 10 INJECTION, EMULSION INTRAVENOUS at 13:45

## 2025-02-24 RX ADMIN — FENTANYL CITRATE 50 MCG: 50 INJECTION, SOLUTION INTRAMUSCULAR; INTRAVENOUS at 13:54

## 2025-02-24 RX ADMIN — ONDANSETRON 4 MG: 2 INJECTION INTRAMUSCULAR; INTRAVENOUS at 13:45

## 2025-02-24 RX ADMIN — SODIUM CHLORIDE, SODIUM LACTATE, POTASSIUM CHLORIDE, AND CALCIUM CHLORIDE 125 ML/HR: .6; .31; .03; .02 INJECTION, SOLUTION INTRAVENOUS at 12:33

## 2025-02-24 RX ADMIN — MIDAZOLAM 2 MG: 1 INJECTION INTRAMUSCULAR; INTRAVENOUS at 13:40

## 2025-02-24 RX ADMIN — CHLORHEXIDINE GLUCONATE 15 ML: 1.2 SOLUTION ORAL at 12:31

## 2025-02-24 RX ADMIN — PROPOFOL 100 MCG/KG/MIN: 10 INJECTION, EMULSION INTRAVENOUS at 13:45

## 2025-02-24 RX ADMIN — ACETAMINOPHEN 650 MG: 325 TABLET ORAL at 15:05

## 2025-02-24 RX ADMIN — CEFAZOLIN SODIUM 2000 MG: 2 SOLUTION INTRAVENOUS at 13:41

## 2025-02-24 RX ADMIN — SODIUM CHLORIDE, SODIUM LACTATE, POTASSIUM CHLORIDE, AND CALCIUM CHLORIDE: .6; .31; .03; .02 INJECTION, SOLUTION INTRAVENOUS at 13:41

## 2025-02-24 NOTE — DISCHARGE SUMMARY
Discharge Summary Outpatient Procedure Podiatry -   Renetta Rendon 49 y.o. 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      Provisions for Follow-Up Care/Important appointments:  See after visit summary for information related to follow-up care and any pertinent home health orders.      Discharge Medications:  See after visit summary for reconciled discharge medications provided to patient and family.

## 2025-02-24 NOTE — DISCHARGE INSTR - AVS FIRST PAGE
Dr. Antelmo Lucas, DPM  Post-op surgery Instructions    Pain / Swelling  There is expected to be some discomfort, swelling and bruising of the foot. You might see some blood on the bandage. This is not a cause for alarm. However, if there is active or persistent bleeding (blood running out of the bandage while at rest) - call the office at once (or) go to a [facility] ER and ask them to page the podiatry residents.  Apply an ice bag to the top of your ankle for 30 minutes for each waking hour, for the first 72 hours. This should be discontinued when sleeping. This will also work through your cast if you have one. Ice must not leak and wet the dressings. Also, using the ice inappropriately can cause permanent nerve damage.  Your foot should be elevated as much as possible for the first 72 hours. The foot should be above heart level. If your foot is below heart level, throbbing and pain will increase.  When sleeping, elevation can be accomplished by putting a small hard suitcase between the box spring and mattress at the foot of the bed.  Walking and standing will increase pain, throbbing and bleeding.  Persistent pain despite elevation and your pain meds can many times be relieved by removing the tight brown compression layer (called the ACE wrap) that is over the white gauze dressing. If you are elevating and taking your pain meds and pain is still severe, remove this brown stretchy layer but leave the gauze intact. Wait 30 minutes. If the pain subsides, reapply the ACE so it’s not so tight. If pain doesn’t get better, call your doctor.   Dressings / Casts  Do not remove your surgical dressings - they will be changed at your doctor appointment. Do not allow surgical dressings to get wet. Sponge baths should be used until the sutures are removed.  Do not try to keep the foot dry using a garbage bag and tape - this rarely works.  If you get your dressings or cast wet - call your doctor immediately.  If  your cast or dressings feel tight - elevate your foot for 30 minutes. If this doesn’t help and you feel tingling or see toe discoloration - call your doctor or go to a [facility] ER and ask them to page the podiatry residents.   Do not put things in your cast such as powder, coat hangers to scratch, etc.. This can cause skin damage and infections.   Infection  If you have a fever at or above 100 degrees, chills, sweats, or see red streaks rising above the dressing or smell odor / see pus (creamy white drainage), call your doctor immediately or go to a [facility] ER and ask them to page the podiatry residents.  Constipation  If you have severe constipation after surgery, this can be due to the pain medication. Notify your doctor and special medication will be prescribed to deal with this.   Blood Clots  If you had surgery and are in a cast, have an external fixation device, or are non-weightbearing using crutches, a knee scooter, a wheelchair, a walker, or an iWalk device - you need to be on a blood thinner. Your doctor will prescribe one of the regimens below. If you run out of the blood thinner checked off below before you are walking normally on your foot and out of your cast - notify your doctor immediately so you can get a refill. Not doing so can lead to blood clots and serious complications including death.    Numbness  It is normal for your foot to be numb until about dinner time. If you’ve had a popliteal block procedure, you might be numb until the following day. When you start to feel pins and needles in the foot - this means the block is wearing off. That is the appropriate time to take your pain medication.   Pain Medication  Do not supplement your pain medication with over the counter drugs, old leftover pain medications, or extra Tylenol. You must discuss any additional medications with your doctor prior to taking them for pain.   Driving  No driving is allowed without discussion with  the doctor  Ambulation  Weight bear as tolerated to surgical foot  If given a flat, stiff shoe / darco wedge shoe, Do not walk at all without it.  Use a device (cane, walker, crutches) to take some weight off of the foot when walking  If instructed not to put weight on the surgical foot, use the following:  Surgical shoe  Putting weight on the foot will lead to complications.

## 2025-02-24 NOTE — ANESTHESIA PREPROCEDURE EVALUATION
Procedure:  EXCISION EXOSTOSIS, fracture fragment excision left medial hallux (Left: Foot)    Relevant Problems   CARDIO   (+) Migraine with aura and without status migrainosus, not intractable      /RENAL   (+) BOR syndrome      MUSCULOSKELETAL   (+) Chronic bilateral low back pain with left-sided sciatica   (+) 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      NEURO/PSYCH   (+) Anxiety   (+) Chronic bilateral low back pain with left-sided sciatica   (+) Depression   (+) Major depressive disorder, single episode, mild (HCC)   (+) Migraine with aura and without status migrainosus, not intractable   (+) Nonintractable generalized 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 (HCC)   (+) Numbness and tingling        Physical Exam    Airway    Mallampati score: II         Dental       Cardiovascular      Pulmonary      Other Findings  post-pubertal.      Anesthesia Plan  ASA Score- 3     Anesthesia Type- IV sedation with anesthesia with ASA Monitors.         Additional Monitors:     Airway Plan:            Plan Factors-    Chart reviewed.                      Induction- intravenous.    Postoperative Plan-         Informed Consent- Anesthetic plan and risks discussed with patient.  I personally reviewed this patient with the CRNA. Discussed and agreed on the Anesthesia Plan with the CRNA..      NPO Status:  Vitals Value Taken Time   Date of last liquid 02/23/25 02/24/25 1207   Time of last liquid 2200 02/24/25 1207   Date of last solid 02/23/25 02/24/25 1207   Time of last solid 2200 02/24/25 1207

## 2025-02-24 NOTE — ANESTHESIA POSTPROCEDURE EVALUATION
Post-Op Assessment Note            No anethesia notable event occurred.            Last Filed PACU Vitals:  Vitals Value Taken Time   Temp 97.1 °F (36.2 °C) 02/24/25 1424   Pulse 72 02/24/25 1442   /56 02/24/25 1440   Resp 15 02/24/25 1442   SpO2 100 % 02/24/25 1442   Vitals shown include unfiled device data.    Modified Kristina:     Vitals Value Taken Time   Activity 2 02/24/25 1440   Respiration 2 02/24/25 1440   Circulation 2 02/24/25 1440   Consciousness 2 02/24/25 1440   Oxygen Saturation 2 02/24/25 1440     Modified Kristina Score: 10

## 2025-02-24 NOTE — ANESTHESIA POSTPROCEDURE EVALUATION
Post-Op Assessment Note    CV Status:  Stable  Pain Score: 0    Pain management: adequate       Mental Status:  Alert and awake   Hydration Status:  Euvolemic   PONV Controlled:  Controlled   Airway Patency:  Patent     Post Op Vitals Reviewed: Yes    No anethesia notable event occurred.    Staff: CRNA           Last Filed PACU Vitals:  Vitals Value Taken Time   Temp 98    Pulse 78    /76    Resp 12    SpO2 98

## 2025-02-24 NOTE — OP NOTE
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    Hemostasis:  - Atraumatic technique   - Manual compression   - Pneumatic ankle tourniquet     Materials:  * No implants in log *    Operative Findings:  - Consistent with diagnosis.     Complications:   None    Procedure and Technique:     Under mild sedation, the patient was brought into the operating room and placed on the operating room table in the supine position. IV sedation was achieved by anesthesia team and a universal timeout was performed where all parties are in agreement of correct patient, correct procedure and correct site. A pneumatic tourniquet was then placed over the patient's left lower extremity with ample padding. A local block was performed consisting of 10 ml of 1% Lidocaine. The foot was then prepped and draped in the usual aseptic manner. An esmarch bandage was used to exsangunate the foot and the pneumatic tourniquet was then inflated to 250 mmHg.    Attention was then directed to the left hallux, using a 15-blade an approximately 4cm incision was made medial to the EHL tendon over the MTPJ. Dissection carried through subcutaneous tissue via blunt dissection to the underlying joint capsule, which was incised with a 15-blade. Upon visualization of the  "joint, non-union fragment along medial aspect of the proximal phalanx was observed, with fibrous tissue within fragment. Non-union fragment was then resected with 15-blade and Rongeur. C-arm imaging confirmed resection of fragment. Remaining MTP joint surface intact. Surgical incision was then irrigated with NSS. Closure obtained with Monocryl and Nylon suture. The foot was cleansed and dried, DSD applied to incision.     The tourniquet was deflated at approximately 16 min and normal hyperemic response was noted to all digits. The patient tolerated the procedure and anesthesia well without immediate complications and transferred to PACU with vital signs stable.     Dr. Lucas was present during the entire procedure and participated in all key aspects.    SIGNATURE: Rufus Dennis DPM  DATE: February 24, 2025  TIME: 2:07 PM      Portions of the record may have been created with voice recognition software. Occasional wrong word or \"sound a like\" substitutions may have occurred due to the inherent limitations of voice recognition software. Read the chart carefully and recognize, using context, where substitutions have occurred.          "

## 2025-02-25 ENCOUNTER — TELEPHONE (OUTPATIENT)
Age: 50
End: 2025-02-25

## 2025-02-25 NOTE — TELEPHONE ENCOUNTER
Caller: Renetta Rendon    Doctor and/or Office: Dr. Lucas/Lionel    #: [phone]    Escalation: Disability forms Patient would like a call back about disability forms she needs filled out. She wants to email them, but she does have mychart as well. Also, I did let her know the turn around time and she said that's basically how long she will be out of work. She would like a return call to discuss further. Thank you

## 2025-02-28 ENCOUNTER — TELEPHONE (OUTPATIENT)
Age: 50
End: 2025-02-28

## 2025-02-28 NOTE — TELEPHONE ENCOUNTER
Caller: Renetta Rendon    Doctor and/or Office: Dr. Lucas    CB#: [phone]    Escalation: Appointment Patient requesting to switch her post op to 3:30 on 3-11. I did let her know there were other patients at that time, she asked me to send a message anyway. Thank you

## 2025-03-04 ENCOUNTER — OFFICE VISIT (OUTPATIENT)
Age: 50
End: 2025-03-04

## 2025-03-04 VITALS — WEIGHT: 215 LBS | HEIGHT: 66 IN | BODY MASS INDEX: 34.55 KG/M2

## 2025-03-04 DIAGNOSIS — S92.415G CLOSED NONDISPLACED FRACTURE OF PROXIMAL PHALANX OF LEFT GREAT TOE WITH DELAYED HEALING, SUBSEQUENT ENCOUNTER: Primary | ICD-10-CM

## 2025-03-04 PROCEDURE — 99024 POSTOP FOLLOW-UP VISIT: CPT | Performed by: PODIATRIST

## 2025-03-04 NOTE — PROGRESS NOTES
PATIENT:  Renetta Rendon      1975    ASSESSMENT     1. Closed nondisplaced fracture of proximal phalanx of left great toe with delayed healing, subsequent encounter               PLAN  Patient is doing well post-operatively.  Sutures left intact.   -May shower and wash with normal water, daily bandage changes with antibiotic ointment return 1 week    HISTORY OF PRESENT ILLNESS  Patient presents for post-op appointment.  Post-op pain is under control and resolving well.  The patient is feeling well and in good spirits.  Patient reported no post-op concern.  Patient relates compliance with surgical shoe, elevation, and keeping dressing clean, dry and intact.    REVIEW OF SYSTEMS  GENERAL: No fever or chills.    HEART: No chest pain, or palpitation  RESPIRATORY:  No SOB or cough  GI: No Nausea, vomit or diarrhea  NEUROLOGIC: No syncope or acute weakness  MUSCULOSKELETAL: No calf pain or edema.      PHYSICAL EXAMINATION  GENERAL  The patient appears in NAD / non-toxic. Afebrile. VSS    VASCULAR EXAM  Pedal pulses and vascular status are intact.  No calf pain or edema bilaterally.  No cyanosis.    DERMATOLOGIC EXAM  Incision is coapted and healing well.  No signs of infection. No active drainage. Normal post-op edema and ecchymosis. No necrosis or dehiscence.    NEUROLOGIC EXAM  AAO X 3.  No focal neurologic deficit.  Neurologic status is intact BLE.    MUSCULOSKELETAL EXAM  Good surgical correction.  Normal post-op findings. ROM intact.  No fluctuation or crepitus.

## 2025-03-06 ENCOUNTER — TELEPHONE (OUTPATIENT)
Age: 50
End: 2025-03-06

## 2025-03-06 NOTE — TELEPHONE ENCOUNTER
Caller: Renetta    Doctor:      Reason for call: trying to walk patient how to send a message with attachment through her portal and dropped call    Call back#:

## 2025-03-06 NOTE — TELEPHONE ENCOUNTER
Caller: Renetta Rendon    Doctor and/or Office: Dr. Lucas/Lionel    #: [phone]    Escalation: Disability forms Patient states her employer did not get her disability forms. I did let her know they were sent. She is requesting they be faxed to [phone] ASAP. Also, she would like a [employer] message letting her know they were sent again. Thank you

## 2025-03-07 NOTE — TELEPHONE ENCOUNTER
Please see below message. Patient calling in to ask Dr. Lucas to fill out her return to work form that she put in [employer] ASAP. If its not filled out by Monday she wont be able to work. Her employer just provided her with this form yesterday. Patient aware Dr. Lucas is in surgery on Fridays. (Can you send her a copy through [employer] as well as fax it to her employer at the number provided on the form. Thank you

## 2025-03-07 NOTE — TELEPHONE ENCOUNTER
Called and let patient know her [place] paperwork does not have her going back to work until 3/11 when she will be reevaluated at her appointment time on that day.

## 2025-03-09 DIAGNOSIS — F51.04 PSYCHOPHYSIOLOGICAL INSOMNIA: ICD-10-CM

## 2025-03-11 ENCOUNTER — OFFICE VISIT (OUTPATIENT)
Dept: PODIATRY | Facility: CLINIC | Age: 50
End: 2025-03-11

## 2025-03-11 VITALS — BODY MASS INDEX: 34.55 KG/M2 | HEIGHT: 66 IN | WEIGHT: 215 LBS

## 2025-03-11 DIAGNOSIS — S92.415G CLOSED NONDISPLACED FRACTURE OF PROXIMAL PHALANX OF LEFT GREAT TOE WITH DELAYED HEALING, SUBSEQUENT ENCOUNTER: Primary | ICD-10-CM

## 2025-03-11 PROCEDURE — 99024 POSTOP FOLLOW-UP VISIT: CPT | Performed by: PODIATRIST

## 2025-03-11 NOTE — PROGRESS NOTES
"Name: Renetta Rendon      : 1975      MRN: [id number]  Encounter Provider: Antelmo Lucas DPM  Encounter Date: 3/11/2025   Encounter department: [facility] [place]  :  Assessment & Plan  Closed nondisplaced fracture of proximal phalanx of left great toe with delayed healing, subsequent encounter         -She is able to return to normal shoes is the only thing that we are waiting on here is soft tissue swelling, her sutures were removed today noted to dehiscence, no incisional restrictions, I discussed offloading the area with basically a donut pad versus a dancers pad and she is to return to wide flexible shoes  - Until the end of the school year she should be given restrictions for allowing sitting every 15 minutes/h and also allowed to wear the shoe gear of her choice to school  - She is to return for repeat assessment in 1 month    History of Present Illness   HPI  Renetta Rendon is a 49 y.o. female who presents valuation management of her surgical site around 2-week status post surgical intervention, she is having some erythema, pain and thinks that she is having a bit of a suture reaction.  Works as a teacher      Review of Systems   Constitutional:  Negative for chills and fever.   HENT:  Negative for ear pain and sore throat.    Eyes:  Negative for pain and visual disturbance.   Respiratory:  Negative for cough and shortness of breath.    Cardiovascular:  Negative for chest pain and palpitations.   Gastrointestinal:  Negative for abdominal pain and vomiting.   Genitourinary:  Negative for dysuria and hematuria.   Musculoskeletal:  Negative for arthralgias and back pain.   Skin:  Negative for color change and rash.   Neurological:  Negative for seizures and syncope.   All other systems reviewed and are negative.         Objective   Ht 5' 6\" (1.676 m)   Wt 97.5 kg (215 lb)   LMP 2025 (Exact Date)   BMI 34.70 kg/m²      Physical Exam  Skin:     Comments: Normal " postop appearance, no evidence of dehiscence following suture removal, there is erythema and warmth to the perioperative area but this is more well-circumscribed and appears to be somewhat of a reaction

## 2025-03-25 ENCOUNTER — OFFICE VISIT (OUTPATIENT)
Dept: NEUROLOGY | Facility: CLINIC | Age: 50
End: 2025-03-25
Payer: COMMERCIAL

## 2025-03-25 VITALS
BODY MASS INDEX: 35.32 KG/M2 | SYSTOLIC BLOOD PRESSURE: 110 MMHG | WEIGHT: 219.8 LBS | HEART RATE: 105 BPM | RESPIRATION RATE: 20 BRPM | OXYGEN SATURATION: 99 % | HEIGHT: 66 IN | DIASTOLIC BLOOD PRESSURE: 60 MMHG | TEMPERATURE: 97.4 F

## 2025-03-25 DIAGNOSIS — G40.309 NONINTRACTABLE GENERALIZED IDIOPATHIC EPILEPSY WITHOUT STATUS EPILEPTICUS (HCC): Primary | ICD-10-CM

## 2025-03-25 DIAGNOSIS — F41.9 ANXIETY: ICD-10-CM

## 2025-03-25 DIAGNOSIS — G43.109 MIGRAINE WITH AURA AND WITHOUT STATUS MIGRAINOSUS, NOT INTRACTABLE: ICD-10-CM

## 2025-03-25 DIAGNOSIS — R41.3 COMPLAINTS OF MEMORY DISTURBANCE: ICD-10-CM

## 2025-03-25 PROCEDURE — 99214 OFFICE O/P EST MOD 30 MIN: CPT | Performed by: PHYSICIAN ASSISTANT

## 2025-03-25 RX ORDER — SERTRALINE HYDROCHLORIDE 100 MG/1
TABLET, FILM COATED ORAL
Qty: 90 TABLET | Refills: 1 | Status: SHIPPED | OUTPATIENT
Start: 2025-03-25

## 2025-03-25 RX ORDER — SERTRALINE HYDROCHLORIDE 25 MG/1
TABLET, FILM COATED ORAL
Qty: 90 TABLET | Refills: 1 | Status: SHIPPED | OUTPATIENT
Start: 2025-03-25

## 2025-03-25 NOTE — PATIENT INSTRUCTIONS
Continue medications the same at this time  Referral to neuropsychology for cognitive eval   Follow up in 4-6 months

## 2025-03-25 NOTE — ASSESSMENT & PLAN NOTE
Patient has been on sertraline for a long time.  She reports pharmacy said the script needs to be changed from 50mg tabs take 2.5 tabs daily to 100mg tab plus a 25mg tab (total dose is 125mg per day).  Orders:    sertraline (Zoloft) 25 mg tablet; Take 1 po daily along with 100mg    sertraline (ZOLOFT) 100 mg tablet; Take 1 po daily along with 25mg

## 2025-03-25 NOTE — ASSESSMENT & PLAN NOTE
Ms. Renetta Rendon is a 49 y.o. 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 She has presumed risk for recurrent seizures based on having had a possible seizure during adulthood and family history of epilepsy. She was previously on higher doses of topiramate (presumably increased for migraines), but her migraines have overall been well controlled and she had not had any seizures, therefore we started reducing the topiramate due to cognitive complaints. She is doing a bit better with a lower dose of topiramate.  Will continue on current dose of topiramate, would not reduce further, as that would be a subtherapeutic dose for epilepsy.    Plan:  - continue topiramate 100mg BID  - call the office if there are any seizures

## 2025-03-25 NOTE — PROGRESS NOTES
Name: Renetta Rendon      : 1975      MRN: [id number]  Encounter Provider: Katina Bowers PA-C  Encounter Date: 3/25/2025   Encounter department: [facility] [place]  :  Assessment & Plan  Nonintractable generalized 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 (HCC)  Ms. Renetta Rendon is a 49 y.o. 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 She has presumed risk for recurrent seizures based on having had a possible seizure during adulthood and family history of epilepsy. She was previously on higher doses of topiramate (presumably increased for migraines), but her migraines have overall been well controlled and she had not had any seizures, therefore we started reducing the topiramate due to cognitive complaints. She is doing a bit better with a lower dose of topiramate.  Will continue on current dose of topiramate, would not reduce further, as that would be a subtherapeutic dose for epilepsy.    Plan:  - continue topiramate 100mg BID  - call the office if there are any seizures         Migraine with aura and without status migrainosus, not intractable  Migraines generally well controlled.    Plan:  - continue topiramate 100mg BID  - continue riboflavin and magnesium glycinate  -can continue Treximet at onset of migraine if needed  -can use dexamethasone 2 mg take 1 tab with food daily x 3 days if you are regular migraine meds do not work  5   -avoid using excessive amount of ibuprofen (no more than 2 days a week, as long-term use of ibuprofen can result in medication overuse headaches/rebound headaches)       Complaints of memory disturbance  Patient with ongoing complaints of memory/cognitive changes.  We have reduced her  topiramate and she felt like this was helping a bit, but still noticing some difficulty.  Discussed neuropsychological evaluation.  Referral placed, given list of outside providers to call, as we do not currently have a neuropsychologist in-network.   Orders:    Ambulatory referral to Neuropsychology; Future    Anxiety  Patient has been on sertraline for a long time.  She reports pharmacy said the script needs to be changed from 50mg tabs take 2.5 tabs daily to 100mg tab plus a 25mg tab (total dose is 125mg per day).  Orders:    sertraline (Zoloft) 25 mg tablet; Take 1 po daily along with 100mg    sertraline (ZOLOFT) 100 mg tablet; Take 1 po daily along with 25mg          History of Present Illness   HPI   Renetta Rednon is a 49 y.o. right handed female here for follow-up evaluation of epilepsy, migraines, and paresthesia.      Interval history 3/25/2025:  Today, patient reports she is overall doing ok.  No events concerning for seizure.  Migraines generally well controlled.  She notes she has continued with some cognitive issues.  Forgets what people told her.  Forgets some details of long term events, such as trips she has taken.  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  She is not sleeping well.  She had surgery on her left foot (non-union fracture with bone fragment), and that went well.  She has been off work but is going back soon.      AED/side effects/compliance:  topiramate 100-100  Paresthesia, cognitive impairment (worse at higher doses, improved with dose reduction)     Event/Seizure semiology:  Absence seizures  Possible generalized tonic clonic seizure     Intake History 6/11/2021  She was diagnosed with absence seizures when she was 8 years old, she was put on Depakote, there were no further seizures. She noticed that she was losing her hair on Depakote. She was off medication since she was 14 years old. She was off AED for many  years until she was 29 years old.  When she was 29 years old, six months after she had her son, she sent a an unusual text message to her  with random numbers. He called her back to find out what happened. She does not remember what happened. She noticed that she had showered but she does not recall showering. She recalled biting her tongue. It was suspected that she had a grand mal seizure.   She saw Dr. Gallegos at [facility] staring around 2003/2004. Probably due to her migraine headaches, she was put on topiramate to manage both conditions. She was planning a pregnant at 31 years old, transitioned from topiramate to lamotrigine. She unfortunately had a seizure.   She lost this pregnancy due to genetic disorder. Then 4 months later she was pregnant with twins; she had stayed on lamotrigine and was constantly checking her lamotrigine levels. After she delivered her twins, she went right back on topiramate.   When she is on topiramate, she struggles with word finding difficulty (I feel stupid).  She does not recall if lamotrigine was causing significant side effects.  Every since 2006, she has been on topiramate. She has always been on topiramate 200mg twice a day; she does not recall ever trying a lower dose of medication.  One night she woke up in a panic, like the world was going to end, she had difficulty thinking and putting her thoughts together, she was not able to calm down her nerves or thoughts, mind was racing like crazy, panic feeling lasted 5 minutes, she was worried that a seizure was going to happen. She was fairly coherent, but did not progress to altered awareness.  She has had anxiety attacks in the past, she gets aura of panic, which then can result in a migraine headaches. She started to have migraines around middle school.   She gets migraines that can last a week, just before her menses or weather changes. Headache is so severe she has to go into a hole, severely nauseated. Head pressure  "sensation over the front of head or back of the neck. She will try Advil 2 tabs to take the edge off and Coke or coffee.   When she was pregnant she did not have migraines.  Topiramate may help with migraines. She does not get a lot of migraines (maybe 5-6 in the last 6 months). If her migraines are severe she has \"major\" medication, compound medication that cost $400. (Looking at the list of medication dichloralphenazone is no longer available in the [place], which contains chloral hydrate.)  She had tried Maxalt but it did not work. It may have been sumatriptan.      She may have used dexamethasone to abort headaches.  She is currently on sumatriptan.     Prior records from [facility]:  Last visit 1/23/2020 - she was prescribed Midrin (isometheptene-acetaminophen-dichloralphenazone) and Treximet for migraines. (she was having 5-10 days/month with headache, required steroids, under a lot of stress.)  She has restless legs, previously prescribed Quinine but she just drinks tonic water now.  Last seizure was in 2006; possible aura of panic, whole body warmth sensation  Visit on 12/5/2018 - she was having 1-2 migraines/month     Interval history 4/27/2022:  Patient was last seen 10 months ago. At timing of last visit, decision was made to reduce topiramate in order to alleviate side effects (she c/o cognitive dysfunction, paresthesia). Plan was to reduce from 200mg BID to 150mg BID.   She tells me today she has been taking topiramate 150mg AM and 200mg PM, says she did not know the plan was to reduce to 150mg BID. She is still having brain fog, cognitive dysfunction. She has trouble with word finding. No issues with ADLs, driving etc. 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  She denies any seizures since her last visit. Migraines have been “Stable”. she uses Treximet at onset of migraine. She is requesting " "Zofran, has previously had Rx for this, but not recently.  She needs refills of all of her medications. She was previously seen by Dr. Hicks who had been refilling her Zoloft and she needs a refill of that. She also needs refill of clonazepam, which she has been prescribed for anxiety to use PRN.     Interval history 8/17/2022:  Patient reports she is overall doing well since last visit. Her topiramate was reduced to 150mg BID. She also had labs done which showed low B12 and she started taking a B12 supplement. She feels she is doing better from a cognitive standpoint with these changes. She no longer has significant issues searching for words.   Her headaches were doing well but increased in the last few weeks. She recently changed birth control pills to a lower dose pill and feels that may be causing increased headaches. She just completed her first month of the new medication.  She reports she is struggling to lose weight. She is not sure she is eating the right things or not. She has been doing some research on gut health and hormones.      Interval history 12/28/2022:  Patient reports she is doing well from a neurologic standpoint. She denies any seizures, headaches are currently well controlled.  She is now following with medical weight loss center and working on diet and exercise, has lost some weight over the past few months.  She does note her mother passed away in October, which was very tough on her. She took a leave from work but will return next week.     Interval history 5/22/2023:  Patient reports she is doing well from a neurologic standpoint. She denies any seizures, headaches are currently well controlled.  In February she called the office reporting an episode of vertigo. This occurred after being treated with antibiotics for a sinus infection. After the episode of vertigo she continued to feel \"off\" and had a headache. I sent in dexamethasone for her and this cleared up her symptoms. She " has not had a recurrence.   She is currently dealing with an issue in the right foot, following with podiatry.     Interval History 8/4/2023  Since her initial consultation with me in 2021, she has had follow-up with Katina Bowers for the past couple of years. Most recently on 5/22/2023 - no recurrent seizures, headaches were under control. Her topiramate dose was reduced due to paresthesias.      She reports that there have been no seizures.  The main issue has been her migraines.   She sometimes associate her migraines with her menstrual period or with bad weather. She usually gets a migraine a week prior to her period.  Since May 2023, she has not had a really bad migraines. However, she may have headaches that happens a couple of times a month, which she will take ibuprofen with [employer], which can last 2-3 days. These headaches are different from her migraines, because she has some nausea with limited painful area over her head. Her migraines are holocephalic and very debilitating to the point that she could not function. She had a 5 day migraine headache a week ago, which sumatriptan-naproxen did not abort the headache, she tried ibuprofen, coca-cola, even a migraine ice pack that goes over her head. She did take the five days of dexamethasone 2mg daily which helped to abort the migraine. She literally drinks water all day.      She continues that her cognition is impaired, with word finding difficulty and recall short term information. She was trying to remember something that happened recently. 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 Occasionally, she will have a bit of dizziness or abnormal lightheaded sensation, but it comes on for a while even if she is seated.   She has been on Zoloft since she had her son who is now a grown adult. It was started by her  family doctor. She feels that her anxiety is stable, but when she tried to get off of sertraline, but it triggered a constant sensation of being anxious.      Interval history 2/6/2024:  Patient was last seen by Dr. Hearn in August 2023. At that time, he suggested decreasing topiramate (due to ongoing cognitive complaints), but she was hesitant due to concern for potential breakthrough seizures. It was suggested she add magnesium and B2 for migraines. She did not want to add another Rx med for migraine prevention.     Today, patient reports her migraines have been overall stable. She had some stomach upset with magnesium oxide and is now taking magnesium gluconate. She felt the B2 was helpful, but she ran out and could not find any of this at the pharmacy. The Treximet still helps to alleviate her migraines when she needs it. She is still experiencing some cognitive difficulty and feels it is related to the topiramate. She is now open to considering decreasing the dose a bit, as previously suggested.     Interval history 7/2/2024:  Patient reports things have been overall stable since her last visit. We decreased her topiramate last visit to see if that would further assist with cognition. She feels things are about the same. There have not been any events concerning for seizure. Migraines overall well controlled, although sometimes her abortive therapy does not work and she feels she would need a course of dexamethasone. In terms of frequency, things are stable, not increased, even with reducing topiramate. She has been under some stress.    Interval history 11/27/2024:  Patient reports things have been overall stable since her last visit. There have not been any events concerning for seizure. Migraines have increased a bit, but she feels that is directly related to stressors over the past few months.   There have not been any events concerning for seizure. She does feel the reductions in topiramate have been helpful  cognitively and wondering if we can make another reduction.      Woman of childbearing age with Epilepsy:  Contraception: Seasonale  Folic acid supplement: No     Prior Epilepsy History:  x     Special Features  Status epilepticus: No  Self Injury Seizures: No  Precipitating Factors: None  Post-ictal state: confusion     Epilepsy Risk Factors:  Abnormal pregnancy: No  Abnormal birth/: No  Abnormal Development: No  Febrile seizures, simple: No  Febrile seizures, complex: No  CNS infection: No  Mental retardation: No  Cerebral palsy: No  Head injury (moderate/severe): No  CNS neoplasm: No  CNS malformation: No  Neurosurgical procedure: No  Stroke: No  CNS autoimmune disorder: No  Alcohol abuse: No  Drug abuse: No  Family history Sz/epilepsy: her son (absence and grand mal seizures) and and one of her twin daughters (grand mal seizure at 4 years old); brother with febrile seizure; one brother with seizure     Prior AEDs:  medication Max dose Time used Reason to stop   divalpreox     Hair loss   lamotrigine         topiramate         Tegretol (as a child)            PRIOR Migraine trials  Abortives - Frova (side effects), Maxalt and Zomig (no help), advil, Zofran, tylenol, flexerile     Prior workup:  x  Imagin2023 - MRI brain wo  1. Grossly stable nonspecific scattered FLAIR hyperintense foci involving bilateral frontoparietal subcortical white matter which can be seen in migraines. Precocious microangiopathy is within differential consideration. Demyelinating disease is much less favored.  2. Chiari I malformation.     2019 - MRI brain  4-5 tiny foci of T2/FLAIR hyperintensity scattered in the subcortical white matter of both hemispheres.     8/10/2015 - MRI brain ([facility])  Right cerebellar tonsil protrudes through the foramen magnum unchanged from MRI 2010     EEGs:  None available      Past Psychiatric History:  Depression: yes  Anxiety: yes  Psychosis: No     Social History  Living  situation: Lives with family  Work: , Bachelors degree  Driving: Yes  reports that she has never smoked. She has never used smokeless tobacco. She reports current alcohol use. She reports that she does not use drugs.    Review of Systems   Constitutional: Negative.  Negative for chills and fever.   HENT: Negative.  Negative for ear pain and sore throat.    Eyes: Negative.  Negative for pain and visual disturbance.   Respiratory: Negative.  Negative for cough and shortness of breath.    Cardiovascular: Negative.  Negative for chest pain and palpitations.   Gastrointestinal: Negative.  Negative for abdominal pain and vomiting.   Endocrine: Negative.    Genitourinary: Negative.  Negative for dysuria and hematuria.   Musculoskeletal: Negative.  Negative for arthralgias and back pain.   Skin: Negative.  Negative for color change and rash.   Allergic/Immunologic: Negative.    Neurological:  Positive for headaches (migraines on and off - allergies vs need new prescription?). Negative for seizures and syncope.   Hematological: Negative.    Psychiatric/Behavioral:  Positive for confusion (memory issues) and sleep disturbance (can't get to sleep).    All other systems reviewed and are negative.   I have personally reviewed the MA's review of systems and made changes as necessary.    Current Outpatient Medications on File Prior to Visit   Medication Sig Dispense Refill    cetirizine (ZyrTEC Allergy) 10 mg tablet Take by mouth      cholecalciferol (VITAMIN D3) 400 units tablet Take 400 Units by mouth daily      ferrous sulfate 324 (65 Fe) mg Take 1 tablet (324 mg total) by mouth daily before breakfast 90 tablet 0    Junel 1/20 1-20 MG-MCG per tablet       magnesium gluconate (MAGONATE) 500 mg tablet Take 500 mg by mouth 2 (two) times a day      melatonin 3 mg Take by mouth daily at bedtime      norethindrone-ethinyl estradiol (Junel 1/20) 1-20 MG-MCG per tablet TAKE 1 TABLET BY MOUTH EVERY DAY 63 tablet 1     "ondansetron (Zofran ODT) 4 mg disintegrating tablet Take 1 tablet (4 mg total) by mouth every 6 (six) hours as needed for nausea or vomiting 30 tablet 0    oxyCODONE-acetaminophen (Percocet) 5-325 mg per tablet Take 1 tablet by mouth every 4 (four) hours as needed for moderate pain for up to 10 doses Max Daily Amount: 6 tablets 10 tablet 0    Riboflavin 400 MG TABS Take 1 tablet (400 mg total) by mouth in the morning 30 tablet 5    SUMAtriptan-naproxen (TREXIMET)  MG per tablet Take 1 po at onset of migraine, may repeat x 1 in 24hrs 10 tablet 1    topiramate (TOPAMAX) 100 mg tablet TAKE 1 AND 1/2 TABLETS TWICE DAILY 90 tablet 8    [DISCONTINUED] sertraline (ZOLOFT) 50 mg tablet TAKE 2 AND 1/2 TABLETS (125MG TOTAL) BY MOUTH DAILY. 225 tablet 1    dexamethasone (DECADRON) 2 mg tablet Take 1 po daily with food x 3 days (Patient not taking: Reported on 7/30/2024) 3 tablet 1    dicyclomine (BENTYL) 20 mg tablet Take 1 tablet (20 mg total) by mouth every 6 (six) hours (Patient not taking: Reported on 7/30/2024) 90 tablet 1    ketoconazole (NIZORAL) 2 % cream APPLY TO AFFECTED AREAS ON THE NECK AND EARS TWICE A DAY FOR 4 WEEKS THEN AS NEEDED (Patient not taking: Reported on 7/30/2024)      ketoconazole (NIZORAL) 2 % shampoo SHAMPOO 2-3 TIMES WEEKLY (Patient not taking: Reported on 7/30/2024)      meloxicam (Mobic) 15 mg tablet Take 1 tablet (15 mg total) by mouth daily (Patient not taking: Reported on 2/17/2025) 30 tablet 1    pantoprazole (PROTONIX) 40 mg tablet Take 1 tablet (40 mg total) by mouth 2 (two) times a day (Patient not taking: Reported on 7/2/2024) 60 tablet 0     No current facility-administered medications on file prior to visit.         Objective   /60 (BP Location: Left arm, Patient Position: Sitting, Cuff Size: Adult)   Pulse 105   Temp (!) 97.4 °F (36.3 °C) (Temporal)   Resp 20   Ht 5' 6\" (1.676 m)   Wt 99.7 kg (219 lb 12.8 oz)   LMP 02/12/2025 (Exact Date)   SpO2 99%   BMI 35.48 " kg/m²     Physical Exam  Constitutional:       Appearance: Normal appearance.   Eyes:      Extraocular Movements: EOM normal.      Pupils: Pupils are equal, round, and reactive to light.   Neurological:      Mental Status: She is alert.      Motor: Motor strength is normal.     Deep Tendon Reflexes: Reflexes are normal and symmetric.   Psychiatric:         Mood and Affect: Mood normal.         Speech: Speech normal.         Behavior: Behavior normal.       Neurological Exam  Mental Status  Alert. Oriented to person, place, time and situation. Speech is normal. Language is fluent with no aphasia. Attention and concentration are normal.    Cranial Nerves  CN II: Visual fields full to confrontation.  CN III, IV, VI: Extraocular movements intact bilaterally. Pupils equal round and reactive to light bilaterally.  CN V: Facial sensation is normal.  CN VII: Full and symmetric facial movement.  CN VIII: Hearing is normal.  CN IX, X: Palate elevates symmetrically  CN XI: Shoulder shrug strength is normal.  CN XII: Tongue midline without atrophy or fasciculations.    Motor   Normal muscle tone. Strength is 5/5 throughout all four extremities.    Sensory  Light touch is normal in upper and lower extremities.     Reflexes  Deep tendon reflexes are 2+ and symmetric in all four extremities.    Coordination  Right: Finger-to-nose normal.Left: Finger-to-nose normal.    Gait  Casual gait is normal including stance, stride, and arm swing.

## 2025-03-25 NOTE — ASSESSMENT & PLAN NOTE
Migraines generally well controlled.    Plan:  - continue topiramate 100mg BID  - continue riboflavin and magnesium glycinate  -can continue Treximet at onset of migraine if needed  -can use dexamethasone 2 mg take 1 tab with food daily x 3 days if you are regular migraine meds do not work  5   -avoid using excessive amount of ibuprofen (no more than 2 days a week, as long-term use of ibuprofen can result in medication overuse headaches/rebound headaches)

## 2025-04-15 ENCOUNTER — OFFICE VISIT (OUTPATIENT)
Age: 50
End: 2025-04-15

## 2025-04-15 VITALS — HEIGHT: 66 IN | BODY MASS INDEX: 35.2 KG/M2 | WEIGHT: 219 LBS

## 2025-04-15 DIAGNOSIS — M21.619 BUNION: ICD-10-CM

## 2025-04-15 DIAGNOSIS — Z98.890 POST-OPERATIVE STATE: Primary | ICD-10-CM

## 2025-04-15 PROCEDURE — 99024 POSTOP FOLLOW-UP VISIT: CPT | Performed by: PODIATRIST

## 2025-04-15 NOTE — PROGRESS NOTES
":  Assessment & Plan  Post-operative state    Orders:    XR foot 3+ vw left; Future    Ambulatory referral to Physical Therapy; Future    Orthotics B/L    Bunion    Orders:    Ambulatory referral to Physical Therapy; Future    Orthotics B/L    - She is doing great postop, no further issues here we will discharge her for the left foot  - She is having some mild midfoot pain on some bunion pain  - She does have some metatarsus adductus on the right foot and some early onset midfoot arthritis, we did discuss shoe gear and shoe gear of choice and also buying new shoes  - But I would recommend custom molded orthotics to maintain her foot deformity we will send her for physical therapy for this  - She is to return prn    History of Present Illness     Renetta Rendon is a 49 y.o. female   Presents for evaluation and management of her left foot. She is doing overall very well status post fracture fragment incision.  She is having some midfoot pain bilaterally presents today in okay wide shoes      Review of Systems   Constitutional:  Negative for chills and fever.   HENT:  Negative for ear pain and sore throat.    Eyes:  Negative for pain and visual disturbance.   Respiratory:  Negative for cough and shortness of breath.    Cardiovascular:  Negative for chest pain and palpitations.   Gastrointestinal:  Negative for abdominal pain and vomiting.   Genitourinary:  Negative for dysuria and hematuria.   Musculoskeletal:  Negative for arthralgias and back pain.   Skin:  Negative for color change and rash.   Neurological:  Negative for seizures and syncope.   All other systems reviewed and are negative.    Objective   Ht 5' 6\" (1.676 m)   Wt 99.3 kg (219 lb)   BMI 35.35 kg/m²      Physical Exam  Skin:     Comments: Her post op appearanace wit wn; No TTP along the left foot. Slight swelling, but more than appropriatew for this time in post op course. Slight discomfort with ROm of TMTJs           "

## 2025-04-22 ENCOUNTER — OFFICE VISIT (OUTPATIENT)
Dept: FAMILY MEDICINE CLINIC | Facility: CLINIC | Age: 50
End: 2025-04-22
Payer: COMMERCIAL

## 2025-04-22 VITALS
TEMPERATURE: 97.8 F | OXYGEN SATURATION: 98 % | WEIGHT: 217 LBS | DIASTOLIC BLOOD PRESSURE: 78 MMHG | HEART RATE: 87 BPM | SYSTOLIC BLOOD PRESSURE: 122 MMHG | HEIGHT: 66 IN | BODY MASS INDEX: 34.87 KG/M2

## 2025-04-22 DIAGNOSIS — E66.811 OBESITY, CLASS I, BMI 30-34.9: ICD-10-CM

## 2025-04-22 DIAGNOSIS — G25.81 RESTLESS LEG: ICD-10-CM

## 2025-04-22 DIAGNOSIS — Z00.00 ANNUAL PHYSICAL EXAM: Primary | ICD-10-CM

## 2025-04-22 PROBLEM — Q87.89: Status: RESOLVED | Noted: 2021-06-11 | Resolved: 2025-04-22

## 2025-04-22 PROCEDURE — 99396 PREV VISIT EST AGE 40-64: CPT | Performed by: FAMILY MEDICINE

## 2025-04-22 PROCEDURE — 99213 OFFICE O/P EST LOW 20 MIN: CPT | Performed by: FAMILY MEDICINE

## 2025-04-22 RX ORDER — FERROUS SULFATE 324(65)MG
324 TABLET, DELAYED RELEASE (ENTERIC COATED) ORAL
Qty: 90 TABLET | Refills: 0 | Status: SHIPPED | OUTPATIENT
Start: 2025-04-22

## 2025-04-22 NOTE — PATIENT INSTRUCTIONS
"Patient Education     Routine physical for adults   The Basics   Written by the doctors and editors at [facility]   What is a physical? -- A physical is a routine visit, or \"check-up,\" with your doctor. You might also hear it called a \"wellness visit\" or \"preventive visit.\"  During each visit, the doctor will:   Ask about your physical and mental health   Ask about your habits, behaviors, and lifestyle   Do an exam   Give you vaccines if needed   Talk to you about any medicines you take   Give advice about your health   Answer your questions  Getting regular check-ups is an important part of taking care of your health. It can help your doctor find and treat any problems you have. But it's also important for preventing health problems.  A routine physical is different from a \"sick visit.\" A sick visit is when you see a doctor because of a health concern or problem. Since physicals are scheduled ahead of time, you can think about what you want to ask the doctor.  How often should I get a physical? -- It depends on your age and health. In general, for people age 21 years and older:   If you are younger than 50 years, you might be able to get a physical every 3 years.   If you are 50 years or older, your doctor might recommend a physical every year.  If you have an ongoing health condition, like diabetes or high blood pressure, your doctor will probably want to see you more often.  What happens during a physical? -- In general, each visit will include:   Physical exam - The doctor or nurse will check your height, weight, heart rate, and blood pressure. They will also look at your eyes and ears. They will ask about how you are feeling and whether you have any symptoms that bother you.   Medicines - It's a good idea to bring a list of all the medicines you take to each doctor visit. Your doctor will talk to you about your medicines and answer any questions. Tell them if you are having any side effects that bother you. You " "should also tell them if you are having trouble paying for any of your medicines.   Habits and behaviors - This includes:   Your diet   Your exercise habits   Whether you smoke, drink alcohol, or use drugs   Whether you are sexually active   Whether you feel safe at home  Your doctor will talk to you about things you can do to improve your health and lower your risk of health problems. They will also offer help and support. For example, if you want to quit smoking, they can give you advice and might prescribe medicines. If you want to improve your diet or get more physical activity, they can help you with this, too.   Lab tests, if needed - The tests you get will depend on your age and situation. For example, your doctor might want to check your:   Cholesterol   Blood sugar   Iron level   Vaccines - The recommended vaccines will depend on your age, health, and what vaccines you already had. Vaccines are very important because they can prevent certain serious or deadly infections.   Discussion of screening - \"Screening\" means checking for diseases or other health problems before they cause symptoms. Your doctor can recommend screening based on your age, risk, and preferences. This might include tests to check for:   Cancer, such as breast, prostate, cervical, ovarian, colorectal, prostate, lung, or skin cancer   Sexually transmitted infections, such as chlamydia and gonorrhea   Mental health conditions like depression and anxiety  Your doctor will talk to you about the different types of screening tests. They can help you decide which screenings to have. They can also explain what the results might mean.   Answering questions - The physical is a good time to ask the doctor or nurse questions about your health. If needed, they can refer you to other doctors or specialists, too.  Adults older than 65 years often need other care, too. As you get older, your doctor will talk to you about:   How to prevent falling at " home   Hearing or vision tests   Memory testing   How to take your medicines safely   Making sure that you have the help and support you need at home  All topics are updated as new evidence becomes available and our peer review process is complete.  This topic retrieved from [employer] on: May 02, 2024.  Topic 227277 Version 1.0  Release: 32.4.3 - C32.122  © 2024 UpToDate, Inc. and/or its affiliates. All rights reserved.  Consumer Information Use and Disclaimer   Disclaimer: This generalized information is a limited summary of diagnosis, treatment, and/or medication information. It is not meant to be comprehensive and should be used as a tool to help the user understand and/or assess potential diagnostic and treatment options. It does NOT include all information about conditions, treatments, medications, side effects, or risks that may apply to a specific patient. It is not intended to be medical advice or a substitute for the medical advice, diagnosis, or treatment of a health care provider based on the health care provider's examination and assessment of a patient's specific and unique circumstances. Patients must speak with a health care provider for complete information about their health, medical questions, and treatment options, including any risks or benefits regarding use of medications. This information does not endorse any treatments or medications as safe, effective, or approved for treating a specific patient. UpToDate, Inc. and its affiliates disclaim any warranty or liability relating to this information or the use thereof.The use of this information is governed by the Terms of Use, available at https://www.[employer].com/en/know/clinical-effectiveness-terms. 2024© UpToDate, Inc. and its affiliates and/or licensors. All rights reserved.  Copyright   © 2024 UpToDate, Inc. and/or its affiliates. All rights reserved.

## 2025-04-22 NOTE — ASSESSMENT & PLAN NOTE
Left foot pain is improving following surgery.  Recommend referral to dietitian for further support regarding weight loss efforts.  Recommend repeat lipid panel.  Recommend follow-up in 6 months or sooner as needed.    Orders:  •  Ambulatory Referral to Nutrition Services; Future  •  Lipid Panel with Direct LDL reflex; Future

## 2025-04-22 NOTE — PROGRESS NOTES
Adult Annual Physical  Name: Renetta Rendon      : 1975      MRN: [id number]  Encounter Provider: Emily Pagan DO  Encounter Date: 2025   Encounter department: [facility]    :  Assessment & Plan  Annual physical exam         Obesity, Class I, BMI 30-34.9      Left foot pain is improving following surgery.  Recommend referral to dietitian for further support regarding weight loss efforts.  Recommend repeat lipid panel.  Recommend follow-up in 6 months or sooner as needed.    Orders:  •  Ambulatory Referral to Nutrition Services; Future  •  Lipid Panel with Direct LDL reflex; Future    Restless leg    Lab Results   Component Value Date    FERRITIN 59 2025     Prescription is sent for iron supplement for treatment of this.  Recommend follow-up in 6 months or sooner as needed.    Orders:  •  ferrous sulfate 324 (65 Fe) mg; Take 1 tablet (324 mg total) by mouth daily before breakfast    Annual physical exam               Preventive Screenings:  - Diabetes Screening: screening up-to-date  - Hepatitis C screening: screening up-to-date   - Cervical cancer screening: screening up-to-date   - Breast cancer screening: screening up-to-date   - Colon cancer screening: screening up-to-date   - Lung cancer screening: screening not indicated          History of Present Illness     Adult Annual Physical:  Patient presents for annual physical.     Diet and Physical Activity:  - Diet/Nutrition: no special diet.  - Exercise: no formal exercise.    General Health:  - Sleep: 7-8 hours of sleep on average.  - Hearing: normal hearing bilateral ears.  - Vision: most recent eye exam < 1 year ago.  - Dental: regular dental visits.    /GYN Health:  - Follows with GYN: yes.   - Last menstrual cycle: 3/31/2025.   - History of STDs: no  - Contraception: oral contraceptives.      Advanced Care Planning:  - Has an advanced directive?: yes    - Has a durable medical POA?: yes      Review of Systems    Constitutional:  Negative for fatigue and fever.   HENT:  Negative for congestion and sore throat.    Respiratory:  Negative for cough and shortness of breath.    Cardiovascular:  Negative for chest pain, palpitations and leg swelling.   Gastrointestinal:  Negative for abdominal pain, blood in stool, constipation, diarrhea, nausea and vomiting.   Genitourinary:  Negative for dysuria, frequency and hematuria.   Musculoskeletal:  Negative for arthralgias.   Skin:  Negative for rash.   Neurological:  Negative for dizziness and headaches.   Psychiatric/Behavioral:  Negative for dysphoric mood. The patient is not nervous/anxious.      Medical History Reviewed by provider this encounter:  Allergies     .  Past Medical History   Past Medical History:   Diagnosis Date   • Anxiety 2003   • GERD (gastroesophageal reflux disease) ?   • Interstitial cystitis    • Irritable bowel syndrome    • Kidney stone     Found on scan but asymptomatic   • Migraines    • Seizures (HCC)    • Tuberculosis      Past Surgical History:   Procedure Laterality Date   • APPENDECTOMY LAPAROSCOPIC N/A 2024    Procedure: APPENDECTOMY LAPAROSCOPIC;  Surgeon: Per Allen MD;  Location: [facility];  Service: General   •  SECTION      x2   • COLONOSCOPY     • FL GUIDED NEEDLE PLAC BX/ASP/INJ  05/15/2023   • NV PRTL EXC B1 TARSAL/METAR B1 XCP TALUS/CALCANEUS Left 2025    Procedure: EXCISION EXOSTOSIS, fracture fragment excision left medial hallux;  Surgeon: Antelmo Lucas DPM;  Location: [facility];  Service: Podiatry   • UPPER GASTROINTESTINAL ENDOSCOPY       Family History   Problem Relation Age of Onset   • Lung cancer Mother    • Breast cancer Mother    • Stroke Mother    • Hyperlipidemia Mother    • Atrial fibrillation Mother    • Cancer Mother    • Hyperlipidemia Father    • Macular degeneration Father    • Myasthenia gravis Father    • Multiple sclerosis Sister    • Seizures Brother    • Atrial  fibrillation Maternal Aunt    • Heart disease Maternal Grandmother    • Heart disease Maternal Grandfather    • Seizures Paternal Grandmother    • Lung cancer Paternal Grandfather    • Heart disease Paternal Grandfather    • Seizures Daughter    • Seizures Son    • Hearing loss Son         deaf-L severe -R   • Learning disabilities Brother    • Diabetes Neg Hx    • Thyroid disease Neg Hx       reports that she has never smoked. She has never used smokeless tobacco. She reports that she does not currently use alcohol. She reports that she does not use drugs.  Current Outpatient Medications   Medication Instructions   • cetirizine (ZyrTEC Allergy) 10 mg tablet Take by mouth   • cholecalciferol (VITAMIN D3) 400 Units, Daily   • dexamethasone (DECADRON) 2 mg tablet Take 1 po daily with food x 3 days   • ferrous sulfate 324 mg, Oral, Daily before breakfast   • Junel 1/20 1-20 MG-MCG per tablet No dose, route, or frequency recorded.   • magnesium gluconate (MAGONATE) 500 mg, 2 times daily   • melatonin 3 mg Daily at bedtime   • norethindrone-ethinyl estradiol (Junel 1/20) 1-20 MG-MCG per tablet 1 tablet, Oral, Daily   • ondansetron (ZOFRAN ODT) 4 mg, Oral, Every 6 hours PRN   • Riboflavin 400 mg, Oral, Daily   • sertraline (ZOLOFT) 100 mg tablet Take 1 po daily along with 25mg   • sertraline (Zoloft) 25 mg tablet Take 1 po daily along with 100mg   • SUMAtriptan-naproxen (TREXIMET)  MG per tablet Take 1 po at onset of migraine, may repeat x 1 in 24hrs   • topiramate (TOPAMAX) 100 mg tablet TAKE 1 AND 1/2 TABLETS TWICE DAILY     Allergies   Allergen Reactions   • Tetracycline Rash and Throat Swelling     Other reaction(s): Other (See Comments)    throat closes      Current Outpatient Medications on File Prior to Visit   Medication Sig Dispense Refill   • cetirizine (ZyrTEC Allergy) 10 mg tablet Take by mouth     • cholecalciferol (VITAMIN D3) 400 units tablet Take 400 Units by mouth daily     • Junel 1/20 1-20 MG-MCG  "per tablet      • magnesium gluconate (MAGONATE) 500 mg tablet Take 500 mg by mouth 2 (two) times a day     • norethindrone-ethinyl estradiol (Junel 1/20) 1-20 MG-MCG per tablet TAKE 1 TABLET BY MOUTH EVERY DAY 63 tablet 1   • ondansetron (Zofran ODT) 4 mg disintegrating tablet Take 1 tablet (4 mg total) by mouth every 6 (six) hours as needed for nausea or vomiting 30 tablet 0   • Riboflavin 400 MG TABS Take 1 tablet (400 mg total) by mouth in the morning 30 tablet 5   • sertraline (ZOLOFT) 100 mg tablet Take 1 po daily along with 25mg 90 tablet 1   • sertraline (Zoloft) 25 mg tablet Take 1 po daily along with 100mg 90 tablet 1   • SUMAtriptan-naproxen (TREXIMET)  MG per tablet Take 1 po at onset of migraine, may repeat x 1 in 24hrs 10 tablet 1   • topiramate (TOPAMAX) 100 mg tablet TAKE 1 AND 1/2 TABLETS TWICE DAILY 90 tablet 8   • dexamethasone (DECADRON) 2 mg tablet Take 1 po daily with food x 3 days (Patient not taking: Reported on 7/30/2024) 3 tablet 1   • melatonin 3 mg Take by mouth daily at bedtime       No current facility-administered medications on file prior to visit.      Social History     Tobacco Use   • Smoking status: Never   • Smokeless tobacco: Never   Vaping Use   • Vaping status: Never Used   Substance and Sexual Activity   • Alcohol use: Not Currently     Comment: social   • Drug use: Never   • Sexual activity: Not on file       Objective   /78 (BP Location: Left arm, Patient Position: Sitting, Cuff Size: Standard)   Pulse 87   Temp 97.8 °F (36.6 °C) (Tympanic)   Ht 5' 6\" (1.676 m)   Wt 98.4 kg (217 lb)   LMP 03/31/2025   SpO2 98%   BMI 35.02 kg/m²     Physical Exam  Vitals reviewed.   Constitutional:       General: She is not in acute distress.     Appearance: She is well-developed.   HENT:      Head: Normocephalic and atraumatic.      Right Ear: Tympanic membrane, ear canal and external ear normal.      Left Ear: Tympanic membrane, ear canal and external ear normal.      " Nose: Nose normal.      Mouth/Throat:      Mouth: Mucous membranes are moist.      Pharynx: Oropharynx is clear.   Eyes:      Conjunctiva/sclera: Conjunctivae normal.      Pupils: Pupils are equal, round, and reactive to light.   Cardiovascular:      Rate and Rhythm: Normal rate and regular rhythm.      Heart sounds: No murmur heard.  Pulmonary:      Effort: Pulmonary effort is normal. No respiratory distress.      Breath sounds: Normal breath sounds.   Abdominal:      Palpations: Abdomen is soft.      Tenderness: There is no abdominal tenderness.   Musculoskeletal:      Cervical back: Neck supple.      Right lower leg: No edema.      Left lower leg: No edema.   Lymphadenopathy:      Cervical: No cervical adenopathy.   Skin:     General: Skin is warm and dry.   Neurological:      General: No focal deficit present.      Mental Status: She is alert and oriented to person, place, and time.   Psychiatric:         Mood and Affect: Mood normal.         Behavior: Behavior normal.

## 2025-04-23 PROBLEM — G25.81 RESTLESS LEG: Status: ACTIVE | Noted: 2025-04-23

## 2025-04-23 NOTE — ASSESSMENT & PLAN NOTE
Lab Results   Component Value Date    FERRITIN 59 02/14/2025     Prescription is sent for iron supplement for treatment of this.  Recommend follow-up in 6 months or sooner as needed.    Orders:  •  ferrous sulfate 324 (65 Fe) mg; Take 1 tablet (324 mg total) by mouth daily before breakfast

## 2025-05-16 DIAGNOSIS — G40.309 NONINTRACTABLE GENERALIZED IDIOPATHIC EPILEPSY WITHOUT STATUS EPILEPTICUS (HCC): ICD-10-CM

## 2025-05-16 DIAGNOSIS — G43.109 MIGRAINE WITH AURA AND WITHOUT STATUS MIGRAINOSUS, NOT INTRACTABLE: Primary | ICD-10-CM

## 2025-05-16 RX ORDER — TOPIRAMATE 100 MG/1
TABLET, FILM COATED ORAL
Qty: 225 TABLET | Refills: 0 | Status: SHIPPED | OUTPATIENT
Start: 2025-05-16

## 2025-05-16 RX ORDER — NORETHINDRONE ACETATE AND ETHINYL ESTRADIOL 1MG-20(21)
1 KIT ORAL DAILY
Qty: 28 TABLET | Refills: 0 | OUTPATIENT
Start: 2025-05-16

## 2025-05-16 NOTE — TELEPHONE ENCOUNTER
Reason for call:   [x] Refill   [] Prior Auth  [] Other:     Office: [facility]   [] PCP/Provider -   [x] Specialty/Provider - Neurology/ Katina Bowers     Medication: topiramate (TOPAMAX)     Dose/Frequency: 100 mg/ TAKE 1 AND 1/2 TABLETS TWICE DAILY     Quantity: 90 tabs     Pharmacy: [facility] in [place]     Local Pharmacy   Does the patient have enough for 3 days?   [] Yes   [x] No - Send as HP to POD    Mail Away Pharmacy   Does the patient have enough for 10 days?   [] Yes   [] No - Send as HP to POD

## 2025-05-16 NOTE — TELEPHONE ENCOUNTER
Reason for call:   [x] Refill   [] Prior Auth  [] Other:     Office: [facility]  [] PCP/Provider -   [x] Specialty/Provider - GI/ Alf Gilman     Medication: Junel     Dose/Frequency: 1/20 1-20 MG-MCG/ daily     Quantity: 63 tabs     Pharmacy: [facility] in [place]     Local Pharmacy   Does the patient have enough for 3 days?   [] Yes   [x] No - Send as HP to POD    Mail Away Pharmacy   Does the patient have enough for 10 days?   [] Yes   [] No - Send as HP to POD

## 2025-05-18 ENCOUNTER — PATIENT MESSAGE (OUTPATIENT)
Dept: NEUROLOGY | Facility: CLINIC | Age: 50
End: 2025-05-18

## 2025-05-20 RX ORDER — NORETHINDRONE ACETATE AND ETHINYL ESTRADIOL 20; 1 UG/1; MG/1
1 TABLET ORAL DAILY
Qty: 63 TABLET | Refills: 0 | Status: SHIPPED | OUTPATIENT
Start: 2025-05-20

## 2025-05-23 DIAGNOSIS — Z11.1 SCREENING-PULMONARY TB: Primary | ICD-10-CM

## 2025-06-02 ENCOUNTER — OFFICE VISIT (OUTPATIENT)
Dept: FAMILY MEDICINE CLINIC | Facility: CLINIC | Age: 50
End: 2025-06-02
Payer: COMMERCIAL

## 2025-06-02 VITALS
WEIGHT: 222 LBS | HEIGHT: 66 IN | SYSTOLIC BLOOD PRESSURE: 108 MMHG | TEMPERATURE: 97.4 F | HEART RATE: 90 BPM | BODY MASS INDEX: 35.68 KG/M2 | OXYGEN SATURATION: 98 % | DIASTOLIC BLOOD PRESSURE: 72 MMHG

## 2025-06-02 DIAGNOSIS — L23.9 ALLERGIC DERMATITIS: Primary | ICD-10-CM

## 2025-06-02 PROBLEM — Z98.890 POST-OPERATIVE STATE: Status: RESOLVED | Noted: 2025-04-15 | Resolved: 2025-06-02

## 2025-06-02 PROCEDURE — 99213 OFFICE O/P EST LOW 20 MIN: CPT | Performed by: FAMILY MEDICINE

## 2025-06-02 RX ORDER — KETOTIFEN FUMARATE 0.35 MG/ML
1 SOLUTION/ DROPS OPHTHALMIC 2 TIMES DAILY
Qty: 5 ML | Refills: 0 | Status: SHIPPED | OUTPATIENT
Start: 2025-06-02

## 2025-06-02 RX ORDER — FAMOTIDINE 20 MG/1
20 TABLET, FILM COATED ORAL
Qty: 90 TABLET | Refills: 3 | Status: SHIPPED | OUTPATIENT
Start: 2025-06-02 | End: 2026-05-28

## 2025-06-02 NOTE — PROGRESS NOTES
Name: Renetta Rendon      : 1975      MRN: [id number]  Encounter Provider: Emily Pagan DO  Encounter Date: 2025   Encounter department: [facility]  :  Assessment & Plan  Allergic dermatitis    Patient with dermatitis of uncertain etiology suspect allergic versus atopic dermatitis due to itching.  Recommend continued use of cetirizine daily.  May utilize Benadryl as needed in addition to this.  Recommend addition of Pepcid daily for antihistamine urgent treatment.  For sensation of itching in the eyes may trial Zaditor as well.  Recommend treatment course over the next 1 to 2 weeks.  Patient will continue to attempt to determine triggering source.  Discussed potential etiology from something such as her pillow.  Recommend follow-up as needed should symptoms persist or worsen.    Orders:  •  famotidine (PEPCID) 20 mg tablet; Take 1 tablet (20 mg total) by mouth daily at bedtime  •  Ketotifen Fumarate (ZADITOR) 0.035 % ophthalmic solution; Administer 1 drop to both eyes 2 (two) times a day           History of Present Illness   Renetta is a 49-year-old female with past medical history of Chiari I malformation, migraine, LPR who presents today for follow-up regarding rash concerns.    Patient notes that she started yesterday with her left cheek feeling warm and her eye and neck became itchy.  She denies any new foods.  She denies new detergents, skin care products or anything out of the ordinary.  Notes the itching is primarily affecting the left side of her face.    Rash  Pertinent negatives include no congestion, cough, diarrhea, fatigue, fever, shortness of breath, sore throat or vomiting.     Review of Systems   Constitutional:  Negative for fatigue and fever.   HENT:  Negative for congestion and sore throat.    Respiratory:  Negative for cough and shortness of breath.    Cardiovascular:  Negative for chest pain, palpitations and leg swelling.   Gastrointestinal:  Negative  "for abdominal pain, blood in stool, constipation, diarrhea, nausea and vomiting.   Genitourinary:  Negative for dysuria, frequency and hematuria.   Musculoskeletal:  Negative for arthralgias.   Skin:  Positive for rash.   Neurological:  Negative for dizziness and headaches.   Psychiatric/Behavioral:  Negative for dysphoric mood. The patient is not nervous/anxious.        Objective   /72   Pulse 90   Temp (!) 97.4 °F (36.3 °C)   Ht 5' 6\" (1.676 m)   Wt 101 kg (222 lb)   SpO2 98%   BMI 35.83 kg/m²      Physical Exam  Vitals reviewed.   Constitutional:       General: She is not in acute distress.     Appearance: She is well-developed.   HENT:      Head: Normocephalic and atraumatic.      Right Ear: External ear normal.      Left Ear: External ear normal.     Eyes:      Conjunctiva/sclera: Conjunctivae normal.       Cardiovascular:      Rate and Rhythm: Normal rate and regular rhythm.      Heart sounds: No murmur heard.  Pulmonary:      Effort: Pulmonary effort is normal. No respiratory distress.      Breath sounds: Normal breath sounds.   Abdominal:      General: Bowel sounds are normal.      Palpations: Abdomen is soft.      Tenderness: There is no abdominal tenderness.     Musculoskeletal:      Right lower leg: No edema.      Left lower leg: No edema.     Skin:     General: Skin is warm and dry.      Comments: Slight erythema and warmth is noted on the left side of the face.     Neurological:      General: No focal deficit present.      Mental Status: She is alert and oriented to person, place, and time.     Psychiatric:         Mood and Affect: Mood normal.         Behavior: Behavior normal.         "

## 2025-07-05 ENCOUNTER — APPOINTMENT (OUTPATIENT)
Dept: RADIOLOGY | Facility: MEDICAL CENTER | Age: 50
End: 2025-07-05
Payer: COMMERCIAL

## 2025-07-05 DIAGNOSIS — Z11.1 SCREENING-PULMONARY TB: ICD-10-CM

## 2025-07-05 PROCEDURE — 71046 X-RAY EXAM CHEST 2 VIEWS: CPT

## 2025-07-07 ENCOUNTER — TELEPHONE (OUTPATIENT)
Age: 50
End: 2025-07-07

## 2025-07-07 NOTE — TELEPHONE ENCOUNTER
Patient called to see if her chest X-ray results were back yet for her physical tomorrow, made her aware nothing was back yet . Patient is going to call tomorrow morning and if nothing back yet she will reschedule appointment .

## 2025-07-11 ENCOUNTER — CLINICAL SUPPORT (OUTPATIENT)
Dept: NUTRITION | Facility: HOSPITAL | Age: 50
End: 2025-07-11
Attending: FAMILY MEDICINE
Payer: COMMERCIAL

## 2025-07-11 VITALS — BODY MASS INDEX: 35.73 KG/M2 | WEIGHT: 221.4 LBS

## 2025-07-11 DIAGNOSIS — E66.811 OBESITY, CLASS I, BMI 30-34.9: ICD-10-CM

## 2025-07-11 PROCEDURE — 97802 MEDICAL NUTRITION INDIV IN: CPT | Performed by: DIETITIAN, REGISTERED

## 2025-07-11 NOTE — PROGRESS NOTES
" Nutrition Assessment Form    Patient Name: Renetta Rendon    YOB: 1975    Sex: Female     Assessment Date: 7/11/2025  Start Time: 910 Stop Time: 1010 Total Minutes: 60     Data:  Present at session: self   Parent/Patient Concerns/reason for visit: \"I want to lose weight- has been the story of my life and I can't do it\"   Medical Dx/Reason for Referral: obesity   Past Medical History[1]    Current Medications[2]     Additional Meds/Supplements: Not taking vit D    Special Learning Needs/barriers to learning/any new barriers N/a   Height: Ht Readings from Last 5 Encounters:   06/02/25 5' 6\" (1.676 m)   04/22/25 5' 6\" (1.676 m)   04/15/25 5' 6\" (1.676 m)   03/25/25 5' 6\" (1.676 m)   03/11/25 5' 6\" (1.676 m)      Weight: Wt Readings from Last 10 Encounters:   06/02/25 101 kg (222 lb)   04/22/25 98.4 kg (217 lb)   04/15/25 99.3 kg (219 lb)   03/25/25 99.7 kg (219 lb 12.8 oz)   03/11/25 97.5 kg (215 lb)   03/04/25 97.5 kg (215 lb)   02/24/25 97.5 kg (215 lb)   02/17/25 97.9 kg (215 lb 12.8 oz)   02/11/25 97.5 kg (215 lb)   01/30/25 97.5 kg (215 lb)     Estimated body mass index is 35.83 kg/m² as calculated from the following:    Height as of 6/2/25: 5' 6\" (1.676 m).    Weight as of 6/2/25: 101 kg (222 lb).   Recent Weight Change: []Yes     []No  Amount:       Energy Needs: 1525 kcals (25kcals/kg-1000 for 2#/wk wt loss)   Allergies[3] or intolerances NKFA   Social History     Substance and Sexual Activity   Alcohol Use Not Currently    Comment: social    Minimal intake   Tobacco Use History[4]    Who shops? 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 []Yes     [x]No  When:      Why:         Diet Hx:  drinking water all day long-80 oz water   Breakfast:  never skips Diet:  7am (school day) 830 summer once cup coffee x8-10oz chobani creamer truvia  HB eggs  Or avocado toast x 2 - bobby killer usually this morning white  Also banana    Lunch: 1130 chobani yogurt and fruit-(nectarine) maybe a cheese stick  Or turkey x2 sl s/w tomato swiss cheese- mayonnaise     Dinner: 530-6-  meat x2 tenders starch x1/2c  corn vegetables salad -balbir salad  Or chick danitza a manriquez salad-      Snacks: AM -   PM - 3pm- driving home from school almost famished- cucumbers piece of fruit; pretzels small bag or trail mix  HS - minimal- maybe 3x/wk crunchy- cashews handful- or dried banana chips   Other Notes/ Initial Assessment:  Renetta is here because she would like to lose weight. She has been struggling her whole life.  Her  is a doctor and says \"just get out and walk\".  She does not really want to take medication.  She is tracking her calories. She typically eats about 1300+ calories.      Discussed the importance of a healthy lifestyle and it's impact on overall health, inlcuding adequate sleep, consistent activity, healthy stress management techniques, importance of regular consistent food, portioning foods, front loading calories as able and adequate fluids throughout the day.  Practice mindful eating. Be sure to set aside time to eat, eat slowly (20 mins/meal), and savor your food.       Updated assessment (Follow up note only):           Nutrition Diagnosis:   Overweight/obesity  related to Excess energy intake as evidenced " by  BMI more than normative standard for age and sex (obesity-grade II 35-39.9)       Any change or new dx since previous visit:     Nutrition Diagnosis:   N/a      Medical Nutrition Therapy Intervention:  [x]Individualized Meal Plan-Discussed the importance of eating 3 meals daily and not skipping, and how metabolism is affected.  Also discussed adding in small snacks or 5-6 small meals daily if desired vs 3 larger ones, and appropriate options and portions.  Appropriate timing of meals, including eating within 1 hr of waking and eating meals slowly 20mins/meals, minimizing mindless/boredom or habitual eating, etc was also mentioned.  Discussed the plate method of portioning foods, including half a plate fruits and vegetables or a half plate all vegetables, 1/4 of the plate a lean protein source or meat, and a 1/4 of the plate being a whole grain carb- usually 1/2-1c.  This should be followed for at least 2 meals of the day, but could also be followed for all 3.  Fluid intake discussed and appropriate amounts and choices, at least 64oz of water daily. No sugar sweetened beverages. No juice (eat the fruit instead). S/S dehydration also covered. []Understanding Lab Values   []Basic Pathophysiology of Disease []Food/Medication Interactions   []Food Diary [x]Exercise-Studies have shown that the ideal exercise goal is somewhere between 150 to 300 minutes of moderate intensity exercise a week.  Start with exercising 10 minutes every other day and gradually increase physical activity with a goal of at least 150 minutes of moderate intensity exercise a week, divided over at least 3 days a week.  An example of this would be exercising 30 minutes a day, 5 days a week.  Moderate intensity means you should be slightly out of breath, but still able to hold a conversation.  Resistance/ weight bearing training can increase muscle mass and increase our resting metabolic rate.   FULL BODY resistance training is recommended 2-3  times a week.  Do not do this on consecutive days to allow for muscle recovery.      Discussed importance of activity throughout the lifecycle and its impact on overall health/stress management, etc.     Aim for a bare minimum 5000 steps, even on days you do not exercise.   [x]Lifestyle/Behavior Modification Techniques-Discussed the importance of adequate sleep, and ideal sleeping conditions, including a cool temperature 64-68 degrees F, and a dark and quiet room. Also discussed the importance of a regular and consistent sleep routine, including minimizing blue light exposure an hour before sleeping.  Weekend habits should include staying fairly consistent with weekday sleep habits to minimize disruption during the week.  A connection was made between getting adequate and good quality sleep and the ability to handle stress the next day, make healthy food choices, and be active. []Medication, Mechanism of Action   []Label Reading: CHO/ Na/ Fat/ other_________ []Self Blood Glucose Monitoring   [x]Weight/BMI Goals: gain/lose/maintain-Short term goal of 2-4# x 1 month or next visit. Initial weight loss goal of 5-10% weight loss for improved health as studies have shown this is where we see the greatest impact on improving health and decreasing risk of obesity related conditions.  Weight loss can improve patient's co-morbid conditions and/or prevent weight-related complications. []Other -           Comprehension: []Excellent  [x]Very Good  []Good  []Fair   []Poor    Receptivity: []Excellent  [x]Very Good  []Good  []Fair   []Poor    Expected Compliance: []Excellent  [x]Very Good  []Good  []Fair   []Poor        Goals (initial)/ Progress made on previous goals/new goals:  1.portions per plate method as discussed with RD   2.  80oz fluid daily   3.  Protein goal B 20+gms lunch and dinner 30+ gms       No follow-ups on file.  Labs:  CMP  Lab Results   Component Value Date    K 4.3 02/14/2025     02/14/2025    CO2 22  "02/14/2025    BUN 13 02/14/2025    CREATININE 0.64 02/14/2025    GLUF 94 02/14/2025    CALCIUM 9.4 02/14/2025    CORRECTEDCA 10.0 12/03/2022    AST 16 07/09/2024    ALT 11 07/09/2024    ALKPHOS 137 (H) 07/09/2024    EGFR 105 02/14/2025       BMP  Lab Results   Component Value Date    CALCIUM 9.4 02/14/2025    K 4.3 02/14/2025    CO2 22 02/14/2025     02/14/2025    BUN 13 02/14/2025    CREATININE 0.64 02/14/2025       Lipids  No results found for: \"CHOL\"  Lab Results   Component Value Date    HDL 79 09/30/2023    HDL 74 12/03/2022     Lab Results   Component Value Date    LDLCALC 108 (H) 09/30/2023    LDLCALC 136 (H) 12/03/2022     Lab Results   Component Value Date    TRIG 119 09/30/2023    TRIG 83 12/03/2022     No results found for: \"CHOLHDL\"    Hemoglobin A1C  Lab Results   Component Value Date    HGBA1C 5.4 09/30/2023       Fasting Glucose  Lab Results   Component Value Date    GLUF 94 02/14/2025       Insulin     Thyroid  Lab Results   Component Value Date    TSH 1.31 06/21/2022       Hepatic Function Panel  Lab Results   Component Value Date    ALT 11 07/09/2024    AST 16 07/09/2024    ALKPHOS 137 (H) 07/09/2024       Celiac Disease Antibody Panel  IGA   Date Value Ref Range Status   01/24/2021 366.0 70.0 - 400.0 mg/dL Final      Iron  Lab Results   Component Value Date    IRON 124 02/14/2025    TIBC 390.6 02/14/2025    FERRITIN 59 02/14/2025            Cain Parks RD  [facility] CLIN[facility]UTRITION [facility]  [address] [id number]         [1]   Past Medical History:  Diagnosis Date    Anxiety 7/2003    GERD (gastroesophageal reflux disease) 2006?    Interstitial cystitis     Irritable bowel syndrome     Kidney stone     Found on scan but asymptomatic    Migraines     Seizures (HCC)     Tuberculosis 2000   [2]   Current Outpatient Medications   Medication Sig Dispense Refill    cetirizine (ZyrTEC Allergy) 10 mg tablet Take " by mouth      cholecalciferol (VITAMIN D3) 400 units tablet Take 400 Units by mouth in the morning.      dexamethasone (DECADRON) 2 mg tablet Take 1 po daily with food x 3 days (Patient not taking: Reported on 7/30/2024) 3 tablet 1    famotidine (PEPCID) 20 mg tablet Take 1 tablet (20 mg total) by mouth daily at bedtime 90 tablet 3    ferrous sulfate 324 (65 Fe) mg Take 1 tablet (324 mg total) by mouth daily before breakfast 90 tablet 0    Junel 1/20 1-20 MG-MCG per tablet Take 1 tablet by mouth daily 63 tablet 0    Ketotifen Fumarate (ZADITOR) 0.035 % ophthalmic solution Administer 1 drop to both eyes 2 (two) times a day 5 mL 0    magnesium gluconate (MAGONATE) 500 mg tablet Take 500 mg by mouth in the morning and 500 mg in the evening.      melatonin 3 mg Take by mouth daily at bedtime      norethindrone-ethinyl estradiol (Junel 1/20) 1-20 MG-MCG per tablet TAKE 1 TABLET BY MOUTH EVERY DAY 63 tablet 1    ondansetron (Zofran ODT) 4 mg disintegrating tablet Take 1 tablet (4 mg total) by mouth every 6 (six) hours as needed for nausea or vomiting 30 tablet 0    Riboflavin 400 MG TABS Take 1 tablet (400 mg total) by mouth in the morning 30 tablet 5    sertraline (ZOLOFT) 100 mg tablet Take 1 po daily along with 25mg 90 tablet 1    sertraline (Zoloft) 25 mg tablet Take 1 po daily along with 100mg 90 tablet 1    SUMAtriptan-naproxen (TREXIMET)  MG per tablet Take 1 po at onset of migraine, may repeat x 1 in 24hrs 10 tablet 1    topiramate (TOPAMAX) 100 mg tablet TAKE ONE TABLET BY MOUTH EVERY MORNING AND 1&1/2 TABLETS EVERY EVENING 225 tablet 0     No current facility-administered medications for this visit.   [3]   Allergies  Allergen Reactions    Tetracycline Rash and Throat Swelling     Other reaction(s): Other (See Comments)    throat closes   [4]   Social History  Tobacco Use   Smoking Status Never   Smokeless Tobacco Never

## 2025-07-14 ENCOUNTER — OFFICE VISIT (OUTPATIENT)
Dept: FAMILY MEDICINE CLINIC | Facility: CLINIC | Age: 50
End: 2025-07-14
Payer: COMMERCIAL

## 2025-07-14 VITALS
HEIGHT: 66 IN | BODY MASS INDEX: 35.68 KG/M2 | DIASTOLIC BLOOD PRESSURE: 78 MMHG | OXYGEN SATURATION: 99 % | WEIGHT: 222 LBS | SYSTOLIC BLOOD PRESSURE: 110 MMHG | TEMPERATURE: 97 F | HEART RATE: 100 BPM

## 2025-07-14 DIAGNOSIS — L30.9 DERMATITIS: ICD-10-CM

## 2025-07-14 DIAGNOSIS — E66.9 OBESITY (BMI 30-39.9): Primary | ICD-10-CM

## 2025-07-14 PROCEDURE — 99213 OFFICE O/P EST LOW 20 MIN: CPT | Performed by: FAMILY MEDICINE

## 2025-07-14 RX ORDER — NORETHINDRONE ACETATE AND ETHINYL ESTRADIOL AND FERROUS FUMARATE 1MG-20(21)
KIT ORAL
COMMUNITY
Start: 2025-07-09

## 2025-07-14 NOTE — PROGRESS NOTES
Name: Renetta Rendon      : 1975      MRN: [id number]  Encounter Provider: Emily Pagan DO  Encounter Date: 2025   Encounter department: [facility]  :  Assessment & Plan  Obesity (BMI 30-39.9)      Patient has been able to establish with nutritionist.  Nutritionist suspects that psychosocial stressors are a large contributor to lack of weight loss.  Patient will consider referral to behavioral health therapy in the future.  She is working to reduce her day-to-day stresses.  We do review that high cortisol can contribute to difficulties with weight loss.  Follow-up in October as scheduled.  Paperwork is completed as requested.         Dermatitis    Dermatitis has resolved at this time.  Patient notes that with recent abscess she did have a recurrent flare of this.  Suspect that this may be more of an inflammatory or eczema related reaction rather than allergic dermatitis.  Will continue to monitor this.                History of Present Illness   Renetta is a 49-year-old female with past medical history of Chiari I malformation, epilepsy, interstitial cystitis, obesity, migraine, interstitial cystitis who presents today for follow-up regarding chronic conditions.  She requests completion of work physical paperwork due to applying for to new positions as a .  Notes psychosocial stressors related to finances.      Review of Systems   Constitutional:  Negative for fatigue and fever.   HENT:  Negative for congestion and sore throat.    Respiratory:  Negative for cough and shortness of breath.    Cardiovascular:  Negative for chest pain, palpitations and leg swelling.   Gastrointestinal:  Negative for abdominal pain, blood in stool, constipation, diarrhea, nausea and vomiting.   Genitourinary:  Negative for dysuria, frequency and hematuria.   Musculoskeletal:  Negative for arthralgias.   Skin:  Negative for rash.   Neurological:  Negative for dizziness and  "headaches.   Psychiatric/Behavioral:  Negative for dysphoric mood. The patient is not nervous/anxious.        Objective   /78 (BP Location: Left arm, Patient Position: Sitting, Cuff Size: Large)   Pulse 100   Temp (!) 97 °F (36.1 °C) (Tympanic)   Ht 5' 6\" (1.676 m)   Wt 101 kg (222 lb)   SpO2 99%   BMI 35.83 kg/m²      Physical Exam  Vitals reviewed.   Constitutional:       General: She is not in acute distress.     Appearance: She is well-developed.   HENT:      Head: Normocephalic and atraumatic.      Right Ear: Tympanic membrane, ear canal and external ear normal.      Left Ear: Tympanic membrane, ear canal and external ear normal.      Nose: Nose normal.      Mouth/Throat:      Mouth: Mucous membranes are moist.      Pharynx: Oropharynx is clear.     Eyes:      Conjunctiva/sclera: Conjunctivae normal.      Pupils: Pupils are equal, round, and reactive to light.       Cardiovascular:      Rate and Rhythm: Normal rate and regular rhythm.      Heart sounds: No murmur heard.  Pulmonary:      Effort: Pulmonary effort is normal. No respiratory distress.      Breath sounds: Normal breath sounds.   Abdominal:      Palpations: Abdomen is soft.      Tenderness: There is no abdominal tenderness.     Musculoskeletal:      Cervical back: Neck supple.      Right lower leg: No edema.      Left lower leg: No edema.   Lymphadenopathy:      Cervical: No cervical adenopathy.     Skin:     General: Skin is warm and dry.     Neurological:      General: No focal deficit present.      Mental Status: She is alert and oriented to person, place, and time.     Psychiatric:         Mood and Affect: Mood normal.         Behavior: Behavior normal.         "

## 2025-07-16 NOTE — ASSESSMENT & PLAN NOTE
Patient has been able to establish with nutritionist.  Nutritionist suspects that psychosocial stressors are a large contributor to lack of weight loss.  Patient will consider referral to behavioral health therapy in the future.  She is working to reduce her day-to-day stresses.  We do review that high cortisol can contribute to difficulties with weight loss.  Follow-up in October as scheduled.  Paperwork is completed as requested.

## 2025-08-04 ENCOUNTER — OFFICE VISIT (OUTPATIENT)
Dept: FAMILY MEDICINE CLINIC | Facility: CLINIC | Age: 50
End: 2025-08-04
Payer: COMMERCIAL

## 2025-08-04 VITALS
DIASTOLIC BLOOD PRESSURE: 76 MMHG | SYSTOLIC BLOOD PRESSURE: 112 MMHG | BODY MASS INDEX: 35.84 KG/M2 | OXYGEN SATURATION: 98 % | HEIGHT: 66 IN | WEIGHT: 223 LBS | TEMPERATURE: 97.2 F | HEART RATE: 93 BPM

## 2025-08-04 DIAGNOSIS — R10.13 EPIGASTRIC PAIN: Primary | ICD-10-CM

## 2025-08-04 PROCEDURE — 99213 OFFICE O/P EST LOW 20 MIN: CPT | Performed by: FAMILY MEDICINE

## 2025-08-08 ENCOUNTER — HOSPITAL ENCOUNTER (OUTPATIENT)
Dept: ULTRASOUND IMAGING | Facility: HOSPITAL | Age: 50
Discharge: HOME | End: 2025-08-08
Payer: COMMERCIAL

## 2025-08-08 DIAGNOSIS — R10.13 EPIGASTRIC PAIN: ICD-10-CM

## 2025-08-08 PROCEDURE — 76705 ECHO EXAM OF ABDOMEN: CPT

## 2025-08-19 ENCOUNTER — TELEPHONE (OUTPATIENT)
Dept: FAMILY MEDICINE CLINIC | Facility: CLINIC | Age: 50
End: 2025-08-19

## 2025-08-22 ENCOUNTER — TELEPHONE (OUTPATIENT)
Dept: NEUROLOGY | Facility: CLINIC | Age: 50
End: 2025-08-22

## (undated) DEVICE — INTENDED FOR TISSUE SEPARATION, AND OTHER PROCEDURES THAT REQUIRE A SHARP SURGICAL BLADE TO PUNCTURE OR CUT.: Brand: BARD-PARKER ® CARBON RIB-BACK BLADES

## (undated) DEVICE — 5 MM CURVED DISSECTORS WITH MONOPOLAR CAUTERY: Brand: ENDOPATH

## (undated) DEVICE — FLEXIBLE ADHESIVE BANDAGE,X-LARGE: Brand: CURITY

## (undated) DEVICE — GLOVE SRG BIOGEL 7.5

## (undated) DEVICE — 2, DISPOSABLE SUCTION/IRRIGATOR WITHOUT DISPOSABLE TIP: Brand: STRYKEFLOW

## (undated) DEVICE — CURITY NON-ADHERENT STRIPS: Brand: CURITY

## (undated) DEVICE — SURGICAL GOWN, XL SMARTSLEEVE: Brand: CONVERTORS

## (undated) DEVICE — METZENBAUM ADTEC SINGLE USE DISSECTING SCISSORS, SHAFT ONLY, MONOPOLAR, CURVED TO LEFT, WORKING LENGTH: 12 1/4", (310 MM), DIAM. 5 MM, INSULATED, DOUBLE ACTION, STERILE, DISPOSABLE, PACKAGE OF 10 PIECES: Brand: AESCULAP

## (undated) DEVICE — GLOVE SRG LF STRL BGL SKNSNS 7 PF

## (undated) DEVICE — TROCAR: Brand: KII FIOS FIRST ENTRY

## (undated) DEVICE — 3M™ TEGADERM™ TRANSPARENT FILM DRESSING FRAME STYLE, 1624W, 2-3/8 IN X 2-3/4 IN (6 CM X 7 CM), 100/CT 4CT/CASE: Brand: 3M™ TEGADERM™

## (undated) DEVICE — NEEDLE 25G X 1 1/2

## (undated) DEVICE — HARMONIC 1100 SHEARS, 36CM SHAFT LENGTH: Brand: HARMONIC

## (undated) DEVICE — CHLORAPREP HI-LITE 26ML ORANGE

## (undated) DEVICE — SPONGE GAUZE 2 X 2 4PLY STRL

## (undated) DEVICE — BETHLEHEM UNIVERSAL  MIONR EXT: Brand: CARDINAL HEALTH

## (undated) DEVICE — GLOVE INDICATOR PI UNDERGLOVE SZ 8 BLUE

## (undated) DEVICE — TROCARS: Brand: KII® BALLOON BLUNT TIP SYSTEM

## (undated) DEVICE — ENDOPATH ETS-FLEX45 ARTICULATING ENDOSCOPIC LINEAR CUTTER, NO RELOAD: Brand: ENDOPATH

## (undated) DEVICE — ADHESIVE SKIN HIGH VISCOSITY EXOFIN 1ML

## (undated) DEVICE — TROCAR: Brand: KII SLEEVE

## (undated) DEVICE — DISPOSABLE OR TOWEL: Brand: CARDINAL HEALTH

## (undated) DEVICE — GLOVE INDICATOR PI UNDERGLOVE SZ 7.5 BLUE

## (undated) DEVICE — SUT VICRYL 0 UR-6 27 IN J603H

## (undated) DEVICE — GLOVE SRG BIOGEL 8

## (undated) DEVICE — PACK PBDS LAP CHOLE RF

## (undated) DEVICE — GAUZE SPONGES,16 PLY: Brand: CURITY

## (undated) DEVICE — CURITY STRETCH BANDAGE: Brand: CURITY

## (undated) DEVICE — SUT MONOCRYL 3-0 SH 27 IN Y416H

## (undated) DEVICE — SUT MONOCRYL 4-0 PS-2 27 IN Y426H

## (undated) DEVICE — SUT ETHILON 4-0 PS-2 18 IN 1667H

## (undated) DEVICE — SINGLE PORT MANIFOLD: Brand: NEPTUNE 2

## (undated) DEVICE — 4-PORT MANIFOLD: Brand: NEPTUNE 2

## (undated) DEVICE — ZIMMER® STERILE DISPOSABLE TOURNIQUET CUFF, DUAL PORT, SINGLE BLADDER, 18 IN. (46 CM)

## (undated) DEVICE — GLOVE SRG BIOGEL 7

## (undated) DEVICE — ACE WRAP 4 IN STERILE

## (undated) DEVICE — TUBE SET SMOKE EVAC PNEUMOCLEAR HIGH FLOW

## (undated) DEVICE — ENDOPATH ETS45 2.5MM RELOADS (VASCULAR/THIN): Brand: ENDOPATH

## (undated) DEVICE — ENDOPOUCH RETRIEVER SPECIMEN RETRIEVAL BAGS: Brand: ENDOPOUCH RETRIEVER

## (undated) DEVICE — TRAY FOLEY 16FR URIMETER SURESTEP

## (undated) DEVICE — GLOVE INDICATOR PI UNDERGLOVE SZ 7 BLUE